# Patient Record
Sex: MALE | Race: WHITE | Employment: FULL TIME | URBAN - METROPOLITAN AREA
[De-identification: names, ages, dates, MRNs, and addresses within clinical notes are randomized per-mention and may not be internally consistent; named-entity substitution may affect disease eponyms.]

---

## 2019-07-28 PROBLEM — S33.5XXA LUMBAR SPRAIN: Status: ACTIVE | Noted: 2018-04-02

## 2019-07-28 RX ORDER — OMEPRAZOLE 20 MG/1
CAPSULE, DELAYED RELEASE ORAL
COMMUNITY
Start: 2018-06-28 | End: 2019-08-28

## 2019-08-17 ENCOUNTER — HOSPITAL ENCOUNTER (EMERGENCY)
Age: 19
Discharge: HOME | End: 2019-08-17
Payer: COMMERCIAL

## 2019-08-17 ENCOUNTER — APPOINTMENT (OUTPATIENT)
Dept: GENERAL RADIOLOGY | Age: 19
End: 2019-08-17
Payer: COMMERCIAL

## 2019-08-17 VITALS
RESPIRATION RATE: 16 BRPM | TEMPERATURE: 98.5 F | SYSTOLIC BLOOD PRESSURE: 129 MMHG | WEIGHT: 159.83 LBS | BODY MASS INDEX: 21.09 KG/M2 | HEART RATE: 80 BPM | DIASTOLIC BLOOD PRESSURE: 77 MMHG | OXYGEN SATURATION: 100 %

## 2019-08-17 DIAGNOSIS — S61.411A LACERATION OF RIGHT HAND WITHOUT FOREIGN BODY, INITIAL ENCOUNTER: Primary | ICD-10-CM

## 2019-08-17 PROCEDURE — 4500000023 HC ED LEVEL 3 PROCEDURE

## 2019-08-17 PROCEDURE — 99283 EMERGENCY DEPT VISIT LOW MDM: CPT

## 2019-08-17 PROCEDURE — 73130 X-RAY EXAM OF HAND: CPT

## 2019-08-17 PROCEDURE — 90715 TDAP VACCINE 7 YRS/> IM: CPT | Performed by: PHYSICIAN ASSISTANT

## 2019-08-17 PROCEDURE — 90471 IMMUNIZATION ADMIN: CPT | Performed by: PHYSICIAN ASSISTANT

## 2019-08-17 PROCEDURE — 6360000002 HC RX W HCPCS: Performed by: PHYSICIAN ASSISTANT

## 2019-08-17 RX ORDER — CEPHALEXIN 500 MG/1
500 CAPSULE ORAL 4 TIMES DAILY
Qty: 40 CAPSULE | Refills: 0 | Status: SHIPPED | OUTPATIENT
Start: 2019-08-17 | End: 2019-08-27

## 2019-08-17 RX ORDER — LIDOCAINE HYDROCHLORIDE 10 MG/ML
5 INJECTION, SOLUTION INFILTRATION; PERINEURAL ONCE
Status: DISCONTINUED | OUTPATIENT
Start: 2019-08-17 | End: 2019-08-17 | Stop reason: HOSPADM

## 2019-08-17 RX ADMIN — TETANUS TOXOID, REDUCED DIPHTHERIA TOXOID AND ACELLULAR PERTUSSIS VACCINE, ADSORBED 0.5 ML: 5; 2.5; 8; 8; 2.5 SUSPENSION INTRAMUSCULAR at 00:49

## 2019-08-17 ASSESSMENT — PAIN - FUNCTIONAL ASSESSMENT: PAIN_FUNCTIONAL_ASSESSMENT: PREVENTS OR INTERFERES SOME ACTIVE ACTIVITIES AND ADLS

## 2019-08-17 ASSESSMENT — PAIN DESCRIPTION - DESCRIPTORS: DESCRIPTORS: DISCOMFORT

## 2019-08-17 ASSESSMENT — PAIN DESCRIPTION - PROGRESSION: CLINICAL_PROGRESSION: NOT CHANGED

## 2019-08-17 ASSESSMENT — PAIN DESCRIPTION - FREQUENCY: FREQUENCY: CONTINUOUS

## 2019-08-17 ASSESSMENT — PAIN DESCRIPTION - ORIENTATION: ORIENTATION: RIGHT

## 2019-08-17 ASSESSMENT — PAIN SCALES - GENERAL: PAINLEVEL_OUTOF10: 4

## 2019-08-17 ASSESSMENT — PAIN DESCRIPTION - PAIN TYPE: TYPE: ACUTE PAIN

## 2019-08-17 ASSESSMENT — PAIN DESCRIPTION - ONSET: ONSET: SUDDEN

## 2019-08-17 ASSESSMENT — PAIN DESCRIPTION - LOCATION: LOCATION: HAND

## 2019-08-17 NOTE — ED PROVIDER NOTES
History of Present Illness     Patient information was obtained from patient. History/Exam limitations: none. Patient presented to the Emergency Department by private vehicle. Chief Complaint   Laceration (right knuckle laceration after punching a tv, patient unsure of last tetanus shot.)      Patient Identification  Tex Mcclure is a 22 y.o. male. Patient sustained a laceration to the dorsum of his right hand. Mechanism of injury: Patient punched a TV because it was not working. Time of injury: Just prior to arrival.  Cut by metal object: Denies  Cut by glass object: Denies  Possibility of a retained foreign body: Denies states that the TV did not shatter  Tetanus status: Not up-to-date  The patient is right hand dominant  Pain is sharp in nature, constant in duration, non-radiating, exacerbated with movement, no remitting factors despite applying direct pressure to successfully stop the bleeding prior to arrival.      Nursing notes reviewed and I agree    Review of Systems  Pertinent items are noted in HPI. Pertinent negatives: no difficulty controlling bleeding, no arterial or pulsatile bleeding,   no difficulty w/ joint movement, no numbness, tingling or difficulty w/ sensation in area. No pre-syncopal symptoms, dizziness, SOB, chest pain, or  N/V/D. Remainder of the ROS reviewed and negative    History reviewed. No pertinent past medical history. History reviewed. No pertinent family history.   Current Facility-Administered Medications   Medication Dose Route Frequency Provider Last Rate Last Dose    lidocaine 1 % injection 5 mL  5 mL Intradermal Once RUBIO Maciel         Current Outpatient Medications   Medication Sig Dispense Refill    cephALEXin (KEFLEX) 500 MG capsule Take 1 capsule by mouth 4 times daily for 10 days 40 capsule 0    omeprazole (PRILOSEC) 20 MG delayed release capsule One to two QHS for stomach acid      ibuprofen (ADVIL;MOTRIN) 800 MG tablet Take 1 tablet by mouth discharge disposition reasonable. Also, there is no evidence or peritonitis, sepsis, or toxicity. The patient and/or family and I have discussed the diagnosis and risks, and we agree with discharging home to follow-up with their primary doctor. We also discussed returning to the Emergency Department immediately if new or worsening symptoms occur. We have discussed the symptoms which are most concerning (e.g., changing or worsening pain, fever, numbness, weakness, cool or painful extremity or digits) that necessitate immediate return. I discussed with [place] and/or family the exam results, diagnosis, care, prognosis, reasons to return and the importance of follow up. Patient and/or family is in full agreement with plan and all questions have been answered. Specific discharge instructions explained, including reasons to return to the emergency department. Mason is well appearing, non-toxic, and afebrile at the time of discharge. Please note that some or all of this chart was generated using [employer] voice recognition software. Although every effort was made to ensure the accuracy of this automated transcription, some errors in transcription may have occurred. IMPRESSION:  1.  Laceration of right hand without foreign body, initial encounter                    Miranda Esquivel, [address]  08/17/19 0355

## 2020-08-31 ENCOUNTER — OFFICE VISIT (OUTPATIENT)
Dept: SLEEP MEDICINE | Age: 20
End: 2020-08-31
Payer: COMMERCIAL

## 2020-08-31 VITALS
SYSTOLIC BLOOD PRESSURE: 122 MMHG | OXYGEN SATURATION: 98 % | TEMPERATURE: 97.9 F | HEART RATE: 85 BPM | BODY MASS INDEX: 22.62 KG/M2 | RESPIRATION RATE: 18 BRPM | DIASTOLIC BLOOD PRESSURE: 78 MMHG | HEIGHT: 72 IN | WEIGHT: 167 LBS

## 2020-08-31 DIAGNOSIS — G25.81 RLS (RESTLESS LEGS SYNDROME): ICD-10-CM

## 2020-08-31 PROBLEM — F51.8 HYPNIC JERKS: Status: ACTIVE | Noted: 2020-08-31

## 2020-08-31 LAB
A/G RATIO: 2.2 (ref 1.1–2.2)
ALBUMIN SERPL-MCNC: 5.2 G/DL (ref 3.4–5)
ALP BLD-CCNC: 44 U/L (ref 40–129)
ALT SERPL-CCNC: 11 U/L (ref 10–40)
ANION GAP SERPL CALCULATED.3IONS-SCNC: 12 MMOL/L (ref 3–16)
AST SERPL-CCNC: 16 U/L (ref 15–37)
BILIRUB SERPL-MCNC: 1.6 MG/DL (ref 0–1)
BUN BLDV-MCNC: 16 MG/DL (ref 7–20)
CALCIUM SERPL-MCNC: 10.1 MG/DL (ref 8.3–10.6)
CHLORIDE BLD-SCNC: 102 MMOL/L (ref 99–110)
CO2: 26 MMOL/L (ref 21–32)
CREAT SERPL-MCNC: 1 MG/DL (ref 0.9–1.3)
FERRITIN: 157 NG/ML (ref 30–400)
GFR AFRICAN AMERICAN: >60
GFR NON-AFRICAN AMERICAN: >60
GLOBULIN: 2.4 G/DL
GLUCOSE BLD-MCNC: 112 MG/DL (ref 70–99)
PHOSPHORUS: 3.3 MG/DL (ref 2.5–4.9)
POTASSIUM SERPL-SCNC: 4.7 MMOL/L (ref 3.5–5.1)
SODIUM BLD-SCNC: 140 MMOL/L (ref 136–145)
TOTAL PROTEIN: 7.6 G/DL (ref 6.4–8.2)

## 2020-08-31 PROCEDURE — 99204 OFFICE O/P NEW MOD 45 MIN: CPT | Performed by: PSYCHIATRY & NEUROLOGY

## 2020-08-31 RX ORDER — PIMOZIDE 1 MG/1
1 TABLET ORAL 2 TIMES DAILY
COMMUNITY
End: 2020-11-10

## 2020-08-31 RX ORDER — ROPINIROLE 0.5 MG/1
TABLET, FILM COATED ORAL
Qty: 60 TABLET | Refills: 3 | Status: SHIPPED | OUTPATIENT
Start: 2020-08-31 | End: 2020-10-28

## 2020-08-31 RX ORDER — CLONAZEPAM 0.25 MG/1
0.25 TABLET, ORALLY DISINTEGRATING ORAL NIGHTLY PRN
Qty: 30 TABLET | Refills: 0 | Status: SHIPPED | OUTPATIENT
Start: 2020-08-31 | End: 2020-09-08

## 2020-08-31 RX ORDER — BUSPIRONE HYDROCHLORIDE 15 MG/1
15 TABLET ORAL 2 TIMES DAILY
COMMUNITY
End: 2020-11-10

## 2020-08-31 ASSESSMENT — SLEEP AND FATIGUE QUESTIONNAIRES
NECK CIRCUMFERENCE (INCHES): 15.5
HOW LIKELY ARE YOU TO NOD OFF OR FALL ASLEEP WHILE WATCHING TV: 0
HOW LIKELY ARE YOU TO NOD OFF OR FALL ASLEEP WHILE SITTING AND READING: 0
ESS TOTAL SCORE: 4
HOW LIKELY ARE YOU TO NOD OFF OR FALL ASLEEP IN A CAR, WHILE STOPPED FOR A FEW MINUTES IN TRAFFIC: 0
HOW LIKELY ARE YOU TO NOD OFF OR FALL ASLEEP WHILE SITTING INACTIVE IN A PUBLIC PLACE: 0
HOW LIKELY ARE YOU TO NOD OFF OR FALL ASLEEP WHILE LYING DOWN TO REST IN THE AFTERNOON WHEN CIRCUMSTANCES PERMIT: 2
HOW LIKELY ARE YOU TO NOD OFF OR FALL ASLEEP WHILE SITTING QUIETLY AFTER LUNCH WITHOUT ALCOHOL: 1
HOW LIKELY ARE YOU TO NOD OFF OR FALL ASLEEP WHEN YOU ARE A PASSENGER IN A CAR FOR AN HOUR WITHOUT A BREAK: 1
HOW LIKELY ARE YOU TO NOD OFF OR FALL ASLEEP WHILE SITTING AND TALKING TO SOMEONE: 0

## 2020-08-31 ASSESSMENT — ENCOUNTER SYMPTOMS
CHOKING: 0
EYES NEGATIVE: 1
NAUSEA: 1
ALLERGIC/IMMUNOLOGIC NEGATIVE: 1
APNEA: 0

## 2020-08-31 NOTE — PROGRESS NOTES
none.    Restless leg syndrome symptoms; patient has overwhelming urge to move the legs, usually associatedwith unpleasant sensations. The urge to move the legs is worse at rest and at night and relieved by movement. Patient has 7 episodes a week, sometime the patient has trouble falling asleep due to this feeling, mostly atthe bed time associated with sleep disturbance and with involuntary, jerking movements of the legs during sleep.    DOT/CDL - N/A  JOSE ANTONIO/'claudia - N/A      Previous Report(s) Reviewed: historical medical records       Social History     Socioeconomic History    Marital status: Single     Spouse name: Not on file    Number of children: Not on file    Years of education: Not on file    Highest education level: Not on file   Occupational History    Not on file   Social Needs    Financial resource strain: Not on file    Food insecurity     Worry: Not on file     Inability: Not on file    Transportation needs     Medical: Not on file     Non-medical: Not on file   Tobacco Use    Smoking status: Never Smoker    Smokeless tobacco: Never Used   Substance and Sexual Activity    Alcohol use: No    Drug use: Not Currently     Types: Marijuana    Sexual activity: Yes     Partners: Female   Lifestyle    Physical activity     Days per week: Not on file     Minutes per session: Not on file    Stress: Not on file   Relationships    Social connections     Talks on phone: Not on file     Gets together: Not on file     Attends Religion service: Not on file     Active member of club or organization: Not on file     Attends meetings of clubs or organizations: Not on file     Relationship status: Not on file    Intimate partner violence     Fear of current or ex partner: Not on file     Emotionally abused: Not on file     Physically abused: Not on file     Forced sexual activity: Not on file   Other Topics Concern    Not on file   Social History Narrative    Not on file       Prior to Admission medications    Medication Sig Start Date End Date Taking? Authorizing Provider   busPIRone (BUSPAR) 15 MG tablet Take 15 mg by mouth 2 times daily   Yes Historical Provider, MD   pimozide (ORAP) 1 MG tablet Take 1 tablet by mouth 2 times daily   Yes Historical Provider, MD   rOPINIRole (REQUIP) 0.5 MG tablet 1-2 tabs 2 hours before the bedtime 8/31/20  Yes Luke Christian MD   clonazePAM (KLONOPIN) 0.25 MG disintegrating tablet Take 1 tablet by mouth nightly as needed (one tab an hour before he bedtime) for up to 30 days. 8/31/20 9/30/20 Yes Luke Christian MD       Allergies as of 08/31/2020    (No Known Allergies)       Patient Active Problem List   Diagnosis    Lumbar sprain    Attention deficit hyperactivity disorder, combined type    Hypnic jerks       History reviewed. No pertinent past medical history. Past Surgical History:   Procedure Laterality Date    NOSE SURGERY         History reviewed. No pertinent family history. Review of Systems   Constitutional: Positive for fatigue. HENT: Positive for congestion. Eyes: Negative. Respiratory: Negative for apnea and choking. Cardiovascular: Negative. Gastrointestinal: Positive for nausea. Endocrine: Negative. Genitourinary: Negative. Musculoskeletal: Negative. Skin: Negative. Allergic/Immunologic: Negative. Neurological: Negative. Negative for headaches. Hematological: Negative. Psychiatric/Behavioral: Negative. Objective:     Vitals:  Weight BMI Neck circumference    Wt Readings from Last 3 Encounters:   08/31/20 167 lb (75.8 kg)   08/28/19 154 lb 15.7 oz (70.3 kg)   08/17/19 159 lb 13.3 oz (72.5 kg)    Body mass index is 22.65 kg/m².  Neck circumference: 15.5     BP HR SaO2   BP Readings from Last 3 Encounters:   08/31/20 122/78   08/28/19 107/65   08/17/19 129/77    Pulse Readings from Last 3 Encounters:   08/31/20 85   08/28/19 81   08/17/19 80    SpO2 Readings from Last 3 Encounters:   08/31/20 98% 08/28/19 98%   08/17/19 100%        The mandibular molar Class :   [x]1 []2 []3      Mallampati I Airway Classification:   []1 []2 [x]3 []4        Physical Exam  Vitals signs and nursing note reviewed. Constitutional:       Appearance: Normal appearance. HENT:      Head: Atraumatic. Nose: Nose normal.      Mouth/Throat:      Comments: Mallampati class 3, no retrognathia or hypognathia , normal airflow in bilateral nostrils, no septum deviation , crowded oropharynx with low soft palate, high arched hard palate,no tonsils enlargement. Eyes:      Extraocular Movements: Extraocular movements intact. Neck:      Musculoskeletal: Normal range of motion and neck supple. Cardiovascular:      Rate and Rhythm: Normal rate and regular rhythm. Heart sounds: Normal heart sounds. Pulmonary:      Effort: Pulmonary effort is normal.      Breath sounds: Normal breath sounds. Musculoskeletal: Normal range of motion. Skin:     General: Skin is warm. Neurological:      General: No focal deficit present. Psychiatric:         Mood and Affect: Mood normal.         Assessment:   Severe RLS and suspected PLMD, plus hypnic jerk which go worse in the last week, unable to sleep much at the present time. Diagnosis Orders   1. RLS (restless legs syndrome)  Ferritin    Comprehensive Metabolic Panel    Renal Function Panel    rOPINIRole (REQUIP) 0.5 MG tablet    clonazePAM (KLONOPIN) 0.25 MG disintegrating tablet   2. Chronic insomnia     3. Hypnic jerks  clonazePAM (KLONOPIN) 0.25 MG disintegrating tablet     Plan: Will try Requip for RLS/PLMD, Will try Klonopin too for Hypnic jerk  No coffee. Sleep compression between 12 and 8 AM.  Will message me in the next 2 days. Orders Placed This Encounter   Procedures    Ferritin    Comprehensive Metabolic Panel    Renal Function Panel       Return in about 4 weeks (around 9/28/2020) for insomnia.     Javier Ortega MD  Medical Director - [facility]

## 2020-08-31 NOTE — PATIENT INSTRUCTIONS
Patient Education        Restless Legs Syndrome: Care Instructions  Your Care Instructions  Restless legs syndrome is a common nervous system problem. People with this syndrome feel a creeping, achy, or unpleasant feeling in the legs and an overpowering urge to move them. It often occurs in the evening and at night and can lead to sleep problems and tiredness. Your doctor may suggest doing a study of your sleep patterns to figure out what is happening when you try to sleep. Many people get relief from symptoms when they get regular exercise, eat well, and avoid caffeine, alcohol, and tobacco.  Follow-up care is a key part of your treatment and safety. Be sure to make and go to all appointments, and call your doctor if you are having problems. It's also a good idea to know your test results and keep a list of the medicines you take. How can you care for yourself at home? · Take your medicines exactly as prescribed. Call your doctor if you think you are having a problem with your medicine. · Try bathing in hot or cold water. Applying a heating pad or ice bag to your legs may also help symptoms. · Stretch and massage your legs before bed or when discomfort begins. · Get some exercise for at least 30 minutes a day on most days of the week. Stop exercising at least 3 hours before bedtime. · Try to plan for situations where you will need to remain seated for long stretches. For example, if you are traveling by car, plan some stops so you can get out and walk around. · Tell your doctor about any medicines you are taking. This includes all over-the-counter, prescription, and herbal medicines. Some medicines, such as antidepressants, antihistamines, and cold and sinus medicines, can make your symptoms worse. · Avoid caffeine products, such as coffee, tea, cola, and chocolate. Caffeine can interrupt your sleep and stimulate you. · Do not smoke. Nicotine can make restless legs worse.  If you need help quitting, talk to your doctor about stop-smoking programs and medicines. These can increase your chances of quitting for good. · Do not drink alcohol late in the evening. Take steps to help you sleep better  · Get plenty of sunlight in the outdoors, particularly later in the afternoon. · Use the evening hours for settling down. Avoid activities that challenge you in the hours before bedtime. · Eat meals at regular times, and do not snack before bedtime. · Keep your bedroom quiet, dark, and cool. Try using a sleep mask and earplugs to help you sleep. · Limit how much you drink at night to reduce your need to get up to urinate. But do not go to bed thirsty. · Run a fan or other steady \"white noise\" during the night if noises wake you up. · [place] the bed for sleeping and sex. Do your reading or TV watching in another room. · Once you are in bed, relax from head to toe, and guide your mind to pleasant thoughts. · Do not stay in bed longer than 8 hours, and try to avoid naps. When should you call for help? Watch closely for changes in your health, and be sure to contact your doctor if:  · You are still not getting enough sleep. · Your symptoms become more severe or happen more often. Where can you learn more? Go to https://[employer]. org and sign in to your [employer] account. Enter G022 in the [facility] box to learn more about \"Restless Legs Syndrome: Care Instructions. \"     If you do not have an account, please click on the \"Sign Up Now\" link. Current as of: November 20, 2019               Content Version: 12.5  © 4662-2851 Healthwise, Incorporated. Care instructions adapted under license by [facility] ([facility]). If you have questions about a medical condition or this instruction, always ask your healthcare professional. [address] any warranty or liability for your use of this information.

## 2020-09-08 RX ORDER — CLONAZEPAM 0.5 MG/1
TABLET ORAL
Qty: 60 TABLET | Refills: 5 | Status: SHIPPED | OUTPATIENT
Start: 2020-09-08 | End: 2020-09-28

## 2020-09-28 ENCOUNTER — OFFICE VISIT (OUTPATIENT)
Dept: ORTHOPEDIC SURGERY | Age: 20
End: 2020-09-28
Payer: COMMERCIAL

## 2020-09-28 ENCOUNTER — OFFICE VISIT (OUTPATIENT)
Dept: SLEEP MEDICINE | Age: 20
End: 2020-09-28
Payer: COMMERCIAL

## 2020-09-28 VITALS
OXYGEN SATURATION: 99 % | RESPIRATION RATE: 14 BRPM | HEART RATE: 86 BPM | BODY MASS INDEX: 23.98 KG/M2 | HEIGHT: 72 IN | DIASTOLIC BLOOD PRESSURE: 70 MMHG | WEIGHT: 177 LBS | TEMPERATURE: 98.6 F | SYSTOLIC BLOOD PRESSURE: 112 MMHG

## 2020-09-28 VITALS — WEIGHT: 177 LBS | HEIGHT: 72 IN | RESPIRATION RATE: 16 BRPM | BODY MASS INDEX: 23.98 KG/M2 | TEMPERATURE: 98.2 F

## 2020-09-28 PROCEDURE — 99213 OFFICE O/P EST LOW 20 MIN: CPT | Performed by: PSYCHIATRY & NEUROLOGY

## 2020-09-28 PROCEDURE — 99203 OFFICE O/P NEW LOW 30 MIN: CPT | Performed by: ORTHOPAEDIC SURGERY

## 2020-09-28 RX ORDER — CLONAZEPAM 1 MG/1
1 TABLET ORAL NIGHTLY PRN
Qty: 30 TABLET | Refills: 5 | Status: SHIPPED | OUTPATIENT
Start: 2020-09-28 | End: 2020-10-29 | Stop reason: SDUPTHER

## 2020-09-28 ASSESSMENT — SLEEP AND FATIGUE QUESTIONNAIRES
HOW LIKELY ARE YOU TO NOD OFF OR FALL ASLEEP IN A CAR, WHILE STOPPED FOR A FEW MINUTES IN TRAFFIC: 0
ESS TOTAL SCORE: 4
HOW LIKELY ARE YOU TO NOD OFF OR FALL ASLEEP WHILE SITTING INACTIVE IN A PUBLIC PLACE: 0
HOW LIKELY ARE YOU TO NOD OFF OR FALL ASLEEP WHILE WATCHING TV: 0
HOW LIKELY ARE YOU TO NOD OFF OR FALL ASLEEP WHILE SITTING AND READING: 0
HOW LIKELY ARE YOU TO NOD OFF OR FALL ASLEEP WHILE SITTING AND TALKING TO SOMEONE: 0
HOW LIKELY ARE YOU TO NOD OFF OR FALL ASLEEP WHILE LYING DOWN TO REST IN THE AFTERNOON WHEN CIRCUMSTANCES PERMIT: 2
HOW LIKELY ARE YOU TO NOD OFF OR FALL ASLEEP WHEN YOU ARE A PASSENGER IN A CAR FOR AN HOUR WITHOUT A BREAK: 1
HOW LIKELY ARE YOU TO NOD OFF OR FALL ASLEEP WHILE SITTING QUIETLY AFTER LUNCH WITHOUT ALCOHOL: 1

## 2020-09-28 ASSESSMENT — ENCOUNTER SYMPTOMS: APNEA: 0

## 2020-09-28 NOTE — PROGRESS NOTES
CHIEF COMPLAINT: Generalized muscle spasm    HISTORY:  Mr. Deann Saucedo is a 23 y.o. male, who presents today for evaluation of a generalized muscles spasms / twitches. He did not have history of injury. He states symptoms have been present for some time. He has twitches or spasms throughout his body (neck, back, arms, thighs). He states he gets multiple twitches an hour. He was seen by neurologist at [facility], but was not very happy with care. He states she referred him to Dr. Uri Santos because he had trouble sleeping. He was diagnosed with restless leg syndrome and hypnic jerks. He was prescribed Klonopin, which did help with his sleep, but not the twitches. He was then referred by the neurologist to orthopaedics. He denies any pain. Nothing aggravates the symptoms. Nothing decreases the twitches. No numbness or tingling sensation. No neck pain. No tremors. He does have chronic history of headaches. History reviewed. No pertinent past medical history. Past Surgical History:   Procedure Laterality Date    NOSE SURGERY         Current Outpatient Medications   Medication Sig Dispense Refill    clonazePAM (KLONOPIN) 1 MG tablet Take 1 tablet by mouth nightly as needed (one). 30 tablet 5    busPIRone (BUSPAR) 15 MG tablet Take 15 mg by mouth 2 times daily      pimozide (ORAP) 1 MG tablet Take 1 tablet by mouth 2 times daily      rOPINIRole (REQUIP) 0.5 MG tablet 1-2 tabs 2 hours before the bedtime 60 tablet 3     No current facility-administered medications for this visit.         No Known Allergies    Social History     Socioeconomic History    Marital status: Single     Spouse name: Not on file    Number of children: Not on file    Years of education: Not on file    Highest education level: Not on file   Occupational History    Not on file   Social Needs    Financial resource strain: Not on file    Food insecurity     Worry: Not on file     Inability: Not on file   Healios K[employer]K needs Medical: Not on file     Non-medical: Not on file   Tobacco Use    Smoking status: Never Smoker    Smokeless tobacco: Never Used   Substance and Sexual Activity    Alcohol use: No    Drug use: Not Currently     Types: Marijuana    Sexual activity: Yes     Partners: Female   Lifestyle    Physical activity     Days per week: Not on file     Minutes per session: Not on file    Stress: Not on file   Relationships    Social connections     Talks on phone: Not on file     Gets together: Not on file     Attends Anglican service: Not on file     Active member of club or organization: Not on file     Attends meetings of clubs or organizations: Not on file     Relationship status: Not on file    Intimate partner violence     Fear of current or ex partner: Not on file     Emotionally abused: Not on file     Physically abused: Not on file     Forced sexual activity: Not on file   Other Topics Concern    Not on file   Social History Narrative    Not on file       History reviewed. No pertinent family history. Review of Systems:   Pertinent items are noted in HPI. 13 point review of systems from Patient History Form dated 9/28/20 and available in the patient's chart under the Media tab. PHYSICAL EXAM:  Mr. Beatriz Hope is a 23 y.o. male who presents today in no acute distress, awake, alert, and oriented. Temp 98.2 °F (36.8 °C)   Resp 16   Ht 6' (1.829 m)   Wt 177 lb (80.3 kg)   BMI 24.01 kg/m²      On evaluation of his bilateral upper and lower extremities, there is no obvious deformity. There is no swelling and is no ecchymosis. He has no tenderness throughout his entire spine or bilateral upper and lower extremities. He has no pain with ROM of his neck. He has full ROM of shoulder, elbows, wrists, hips, knee, ankles. Distal pulses are 2+ and symmetric bilaterally. Sensation is grossly intact to light touch and symmetric bilateral upper and lower extremities.   His skin is intact without evidence of lesion, laceration or abrasion all extremities  No clonus or hyperreflexia. Negative Márquez's test      Ferritin, Renal function panel, CMP 8/31/20 reviewed. IMPRESSION:    Generalized body muscle spasms / twitches      PLAN:  Discussed that I do not see any specific orthopaedic problem for me to treat. His labs were mostly normal without obvious electrolyte abnormality that could explain spasms. I have provided him a referral to Dr. Reji Gavin (neurology) for second opinion.

## 2020-09-28 NOTE — PROGRESS NOTES
MD RYDER Nguyen Board Certified in Sleep Medicine  Certified in [address] [place] [address] Certified in Neurology [address]  [address] [facility]  [phone]   [address] [place] [address], [address] [place] [address] Ne                      [address]  [address] [place] [id number] [phone]    Subjective:     Patient ID: Francina Closs is a 23 y.o. male. Chief Complaint   Patient presents with    Other     RLS f/u       HPI:        Francina Closs is a 23 y.o. male was seen today as a follow for hypnic jerk and RLS    Goes to bed at 11 PM 12 AM and weekend 3-3:30 AM and get weekdays 7:30 AM and weekend 12-1 PM.  The Klonopin 1 mg did help the jerks, the Requip helps his RLS.        Ferritin  157.0  30.0 - 400.0 ng/mL  Final     The patient is still have muscle contractions even during the daytime    DOT/CDL - N/A        Previous Report(s)Reviewed: historical medical records         Social History     Socioeconomic History    Marital status: Single     Spouse name: Not on file    Number of children: Not on file    Years of education: Not on file    Highest education level: Not on file   Occupational History    Not on file   Social Needs    Financial resource strain: Not on file    Food insecurity     Worry: Not on file     Inability: Not on file    Transportation needs     Medical: Not on file     Non-medical: Not on file   Tobacco Use    Smoking status: Never Smoker    Smokeless tobacco: Never Used   Substance and Sexual Activity    Alcohol use: No    Drug use: Not Currently     Types: Marijuana    Sexual activity: Yes     Partners: Female   Lifestyle    Physical activity     Days per week: Not on file     Minutes per session: Not on file    Stress: Not on file   Relationships    Social connections     Talks on phone: Not on file     Gets together: Not on file     Attends Evangelical service: Not on file     Active member of club or organization: Not on file     Attends meetings of clubs or organizations: Not on file     Relationship status: Not on file    Intimate partner violence     Fear of current or ex partner: Not on file     Emotionally abused: Not on file     Physically abused: Not on file     Forced sexual activity: Not on file   Other Topics Concern    Not on file   Social History Narrative    Not on file       Prior to Admission medications    Medication Sig Start Date End Date Taking? Authorizing Provider   clonazePAM (KLONOPIN) 1 MG tablet Take 1 tablet by mouth nightly as needed (one). 9/28/20 9/28/21 Yes Nikki Collazo MD   rOPINIRole (REQUIP) 0.5 MG tablet 1-2 tabs 2 hours before the bedtime 8/31/20  Yes Nikki Collazo MD   busPIRone (BUSPAR) 15 MG tablet Take 15 mg by mouth 2 times daily    Historical Provider, MD   pimozide (ORAP) 1 MG tablet Take 1 tablet by mouth 2 times daily    Historical Provider, MD       Allergies as of 09/28/2020    (No Known Allergies)       Patient Active Problem List   Diagnosis    Lumbar sprain    Attention deficit hyperactivity disorder, combined type    Hypnic jerks       History reviewed. No pertinent past medical history. Past Surgical History:   Procedure Laterality Date    NOSE SURGERY         History reviewed. No pertinent family history. Review of Systems   Constitutional: Negative for fatigue. Respiratory: Negative for apnea. Cardiovascular: Negative for leg swelling. Genitourinary: Negative for frequency. Neurological: Negative for dizziness and weakness. Objective:     Vitals:  Weight BMI Neck circumference    Wt Readings from Last 3 Encounters:   09/28/20 177 lb (80.3 kg)   09/28/20 177 lb (80.3 kg)   08/31/20 167 lb (75.8 kg)    Body mass index is 24.01 kg/m².        BP HR SaO2   BP Readings from Last 3 Encounters:   09/28/20 112/70   08/31/20

## 2020-10-28 RX ORDER — ROPINIROLE 0.5 MG/1
TABLET, FILM COATED ORAL
Qty: 60 TABLET | Refills: 3 | Status: SHIPPED | OUTPATIENT
Start: 2020-10-28 | End: 2021-04-14

## 2020-10-29 RX ORDER — CLONAZEPAM 1 MG/1
1 TABLET ORAL NIGHTLY PRN
Qty: 30 TABLET | Refills: 5 | Status: SHIPPED | OUTPATIENT
Start: 2020-10-29 | End: 2020-12-07 | Stop reason: SDUPTHER

## 2020-11-09 NOTE — PROGRESS NOTES
11/10/2020  Patient Name: Luther Maldonado  : 2000  Medical Record: <[id number]>    MEDICATIONS  Current Outpatient Medications   Medication Sig Dispense Refill    doxycycline hyclate (VIBRAMYCIN) 100 MG capsule Take 100 mg by mouth 2 times daily      clonazePAM (KLONOPIN) 1 MG tablet Take 1 tablet by mouth nightly as needed (one). 30 tablet 5    rOPINIRole (REQUIP) 0.5 MG tablet TAKE 1 TO 2 TABLETS BY MOUTH 2 HOURS BEFORE THE BEDTIME 60 tablet 3     No current facility-administered medications for this visit. ALLERGIES  No Known Allergies      Comments  No specialty comments available. REFERRING PHYSICIAN: Anam Mckeon MD    HISTORY OF PRESENT ILLNESS  Luther Maldonado is a 23 y.o. male with no significant past medical history who is being seen for follow up evaluation of  joint pain and muscle twitches. He had a tick bite 8 months ago in his left lower leg and developed a concentric rash. Since then he is complaining of muscle twitches. He had extensive neurological evaluation that was unremarkable. He was evaluated by neurologist at [facility]. He had EMG as well that came back normal.  He had a test for Lyme disease and was found to have 2+ bands. He was treated with 14-day course of doxycycline. Rash is still present. He is scheduled to see ID. He is also complaining of fatigue, headaches, balance problems. For the past 3 weeks he is complaining of cracking in the joints with joint discomfort lasting for few minutes. All the joints crack and pop. Symptoms are worse in his ankles. He denies any swelling in the joints. He has morning stiffness lasting for 30 minutes. He denies any family history of rheumatoid arthritis. He denies psoriasis, inflammatory bowel disease, inflammatory back pain, dactylitis, enthesitis, tenosynovitis or uveitis.   He denies malar rash, photosensitivity, oral/nasal ulcers, dry eyes/dry mouth, history of kidney disease, blood clots, pleurisy or pericarditis       HPI  Review of Systems    REVIEW OF SYSTEMS:   Constitutional: No unanticipated weight loss or fevers. Integumentary: No photosensitivity, malar rash, livedo reticularis, alopecia and Raynaud's symptoms, sclerodactyly, skin tightening  Eyes: negative for visual disturbance and persistent redness, discharge from eyes   ENT: - No tinnitus, loss of hearing, vertigo, or recurrent ear infections.  - No history of nasal/oral ulcers. - No history of dry eyes/dry mouth  Cardiovascular: No history of pericarditis, chest pain or murmur or palpitations  Respiratory: No shortness of breath, cough or history of interstitial lung disease. No history of pleurisy. No history of tuberculosis or atypical infections. Gastrointestinal: No history of heart burn, dysphagia or esophageal dysmotility. No change in bowel habits or any inflammatory bowel disease. Genitourinary: No history renal disease, miscarriages. Hematologic/Lymphatic: No abnormal bruising or bleeding, blood clots or swollen lymph nodes. Neurological: No history of  seizure or focal weakness. No history of neuropathies, paresthesias or hyperesthesias, facial droop, diplopia  Psychiatric: No history of bipolar disease, anxiety, depression  Endocrine: Denies any polyuria, polydipsia and osteoporosis  Allergic/Immunologic: No nasal congestion or hives. I have reviewed patients Past medical History, Social History and Family History as mentioned in her chart and this remains unchanged fromprevious. History reviewed. No pertinent past medical history.   Past Surgical History:   Procedure Laterality Date    NOSE SURGERY       Social History     Socioeconomic History    Marital status: Single     Spouse name: Not on file    Number of children: Not on file    Years of education: Not on file    Highest education level: Not on file   Occupational History    Not on file   Social Needs    Financial resource strain: Not on file   [address] [place] [address] ROM, no tenderness to palpation        Knee: Full ROM, no erythema/swelling/laxity/crepitus. Patella not ballotable. Ankle: Full ROM, no swelling or erythema        MTPs: No swelling or erythema  Back- no tenderness. Eyes:  PERRL, extra ocular movements intact, conjunctiva normal   HEENT:  Atraumatic, normocephalic, external ears normal, oropharynx moist, no pharyngeal exudates. Respiratory:  No respiratory distress  GI:  Soft, nondistended, normal bowel sounds, nontender, noorganomegaly, no mass, no rebound, no guarding   :  No costovertebral angle tenderness   Integument:  Well hydrated, no telangiectasias. Erythematous oval-shaped rash on the left calf  Lymphatic:  No lymphadenopathy noted   Neurologic:   Alert & oriented x 3, CN 2-12 normal, no focal deficits noted. Sensations Intact. Muscle strength 5/5 proximallyand distally in upper and lower extremities.    Psychiatric:  Speech and behavior appropriate           LABS AND IMAGING  Outside data reviewed and in HPI    No results found for: WBC, RBC, HGB, HCT, PLT, MCV, MCH, MCHC, RDW, NRBC, SEGSPCT, BANDSPCT, BLASTSPCT, METASPCT, LYMPHOPCT, PROMYELOPCT, MONOPCT, MYELOPCT, EOSPCT, BASOPCT, MONOSABS, LYMPHSABS, EOSABS, BASOSABS, DIFFTYPE    Chemistry        Component Value Date/Time     08/31/2020 1509    K 4.7 08/31/2020 1509     08/31/2020 1509    CO2 26 08/31/2020 1509    BUN 16 08/31/2020 1509    CREATININE 1.0 08/31/2020 1509        Component Value Date/Time    CALCIUM 10.1 08/31/2020 1509    ALKPHOS 44 08/31/2020 1509    AST 16 08/31/2020 1509    ALT 11 08/31/2020 1509    BILITOT 1.6 (H) 08/31/2020 1509          No results found for: SEDRATE  No results found for: CRP  No results found for: DARRYL, RENNY, SSA, SSB, C3, C4  No results found for: RF, CCPABIGG  No results found for: DARRYL, ANATITER, ANAINT, PATH  No results found for: DSDNAG, DSDNAIGGIFA  No results found for: SSAROAB, SSALAAB  No results found for: SMAB, RNPAB  No results found for: CENTABIGG  No results found for: C3, C4, ACE  No results found for: Lestine Hilts, CCL32SPMXM  No results found for: Barba Lanius  No results found for: KJDAFYDZ65  No results found for: TSHFT4, TSH  No results found for: VITD25    ASSESSMENT AND PLAN      Assessment/Plan:      ASSESSMENT:    1. Polyarthralgia    2. Muscle twitch        PLAN:     Sky was seen today for establish care. Diagnoses and all orders for this visit:    Sawyer Knight do not appreciate any swelling on the joint exam.  Doubt Lyme arthritis or rheumatoid arthritis or systemic rheumatic disease. Most likely mechanical pain  I will do work-up to completely rule out rheumatoid arthritis or systemic rheumatic disease  Advised to take over-the-counter ibuprofen or Aleve as needed  Scheduled to see ID for possible Lyme disease  Depending on the results I will arrange a follow-up appointment. If work-up is unremarkable I will see him on as-needed basis  -     DARRYL; Future  -     Hepatitis B Core Antibody, IgM; Future  -     Hepatitis B Surface Antigen; Future  -     Hepatitis C Antibody; Future  -     C-Reactive Protein; Future  -     Comprehensive Metabolic Panel; Future  -     CBC Auto Differential; Future  -     Cyclic Citrul Peptide Antibody, IgG; Future  -     Sedimentation Rate; Future  -     Rheumatoid Factor; Future    Muscle twitch-had extensive work-up which was unremarkable. Diagnosed with benign fasciculations. Requesting a second opinion. Will refer to Dr. Jacques Majano MD, Neurology, [facility]         The patient indicates understanding of these issues and agrees with the plan. Return if symptoms worsen or fail to improve. The risks and benefits of my recommendations, as well as other treatment options, benefits and side effects werediscussed with the patient. All questions were answered.     I reviewed patient's history, referral documents and electronic medical records  Copy of consult note is being routedelectronically/faxed to referring physician         ######################################################################    I thank you for giving me theopportunity to participate in [facility] care. If you have any questions or concerns please feel free to contact me. I look forward to following  Sky along with you. Electronically signed by: India Hall MD, 11/10/2020 1:57 PM    Documentation was done using voice recognition dragon software. Every effort was made to ensure accuracy;however, inadvertent unintentional computerized transcription errors may be present.

## 2020-11-10 ENCOUNTER — OFFICE VISIT (OUTPATIENT)
Dept: RHEUMATOLOGY | Age: 20
End: 2020-11-10
Payer: COMMERCIAL

## 2020-11-10 VITALS
TEMPERATURE: 97.8 F | WEIGHT: 177.4 LBS | HEIGHT: 72 IN | HEART RATE: 80 BPM | SYSTOLIC BLOOD PRESSURE: 108 MMHG | DIASTOLIC BLOOD PRESSURE: 65 MMHG | BODY MASS INDEX: 24.03 KG/M2

## 2020-11-10 DIAGNOSIS — M25.50 POLYARTHRALGIA: ICD-10-CM

## 2020-11-10 LAB
BASOPHILS ABSOLUTE: 0 K/UL (ref 0–0.2)
BASOPHILS RELATIVE PERCENT: 0.9 %
EOSINOPHILS ABSOLUTE: 0.1 K/UL (ref 0–0.6)
EOSINOPHILS RELATIVE PERCENT: 1.2 %
HCT VFR BLD CALC: 44.3 % (ref 40.5–52.5)
HEMOGLOBIN: 14.9 G/DL (ref 13.5–17.5)
LYMPHOCYTES ABSOLUTE: 1.5 K/UL (ref 1–5.1)
LYMPHOCYTES RELATIVE PERCENT: 27 %
MCH RBC QN AUTO: 29.9 PG (ref 26–34)
MCHC RBC AUTO-ENTMCNC: 33.6 G/DL (ref 31–36)
MCV RBC AUTO: 89.1 FL (ref 80–100)
MONOCYTES ABSOLUTE: 0.5 K/UL (ref 0–1.3)
MONOCYTES RELATIVE PERCENT: 9.2 %
NEUTROPHILS ABSOLUTE: 3.5 K/UL (ref 1.7–7.7)
NEUTROPHILS RELATIVE PERCENT: 61.7 %
PDW BLD-RTO: 13.2 % (ref 12.4–15.4)
PLATELET # BLD: 197 K/UL (ref 135–450)
PMV BLD AUTO: 10 FL (ref 5–10.5)
RBC # BLD: 4.97 M/UL (ref 4.2–5.9)
SEDIMENTATION RATE, ERYTHROCYTE: 2 MM/HR (ref 0–15)
WBC # BLD: 5.6 K/UL (ref 4–11)

## 2020-11-10 PROCEDURE — 99244 OFF/OP CNSLTJ NEW/EST MOD 40: CPT | Performed by: INTERNAL MEDICINE

## 2020-11-10 RX ORDER — DOXYCYCLINE HYCLATE 100 MG/1
100 CAPSULE ORAL 2 TIMES DAILY
COMMUNITY
End: 2020-12-08 | Stop reason: ALTCHOICE

## 2020-11-10 NOTE — LETTER
[facility]  [address] [address]  Phone: [phone]  Fax: [phone]    Yusuf Eldridge MD        November 10, 2020     Vita Blandon MD  [address] [place] [address]. [address] [place] [address],[address], [place] [address]    Patient: Racheal Bundy  MR Number: <[id number]>  YOB: 2000  Date of Visit: 11/10/2020    Dear Dr. Kojo Shaw: Thank you for your referral. Progress note attached in visit summary. If you have questions, please do not hesitate to call me. I look forward to following Joselo Santos along with you.     Sincerely,        Yusuf Eldridge MD

## 2020-11-11 LAB
A/G RATIO: 1.8 (ref 1.1–2.2)
ALBUMIN SERPL-MCNC: 4.8 G/DL (ref 3.4–5)
ALP BLD-CCNC: 48 U/L (ref 40–129)
ALT SERPL-CCNC: 16 U/L (ref 10–40)
ANION GAP SERPL CALCULATED.3IONS-SCNC: 10 MMOL/L (ref 3–16)
ANTI-NUCLEAR ANTIBODY (ANA): NEGATIVE
AST SERPL-CCNC: 18 U/L (ref 15–37)
BILIRUB SERPL-MCNC: 0.7 MG/DL (ref 0–1)
BUN BLDV-MCNC: 17 MG/DL (ref 7–20)
C-REACTIVE PROTEIN: 0.6 MG/L (ref 0–5.1)
CALCIUM SERPL-MCNC: 9.6 MG/DL (ref 8.3–10.6)
CHLORIDE BLD-SCNC: 101 MMOL/L (ref 99–110)
CO2: 26 MMOL/L (ref 21–32)
CREAT SERPL-MCNC: 1 MG/DL (ref 0.9–1.3)
CYCLIC CITRULLINATED PEPTIDE ANTIBODY IGG: <0.5 U/ML (ref 0–2.9)
GFR AFRICAN AMERICAN: >60
GFR NON-AFRICAN AMERICAN: >60
GLOBULIN: 2.6 G/DL
GLUCOSE BLD-MCNC: 97 MG/DL (ref 70–99)
HEPATITIS B CORE IGM ANTIBODY: NORMAL
HEPATITIS B SURFACE ANTIGEN INTERPRETATION: NORMAL
HEPATITIS C ANTIBODY INTERPRETATION: NORMAL
POTASSIUM SERPL-SCNC: 4.4 MMOL/L (ref 3.5–5.1)
RHEUMATOID FACTOR: <10 IU/ML
SODIUM BLD-SCNC: 137 MMOL/L (ref 136–145)
TOTAL PROTEIN: 7.4 G/DL (ref 6.4–8.2)

## 2020-12-07 ENCOUNTER — OFFICE VISIT (OUTPATIENT)
Dept: SLEEP MEDICINE | Age: 20
End: 2020-12-07
Payer: COMMERCIAL

## 2020-12-07 VITALS
WEIGHT: 179 LBS | SYSTOLIC BLOOD PRESSURE: 121 MMHG | TEMPERATURE: 97.8 F | HEART RATE: 73 BPM | OXYGEN SATURATION: 98 % | DIASTOLIC BLOOD PRESSURE: 77 MMHG | BODY MASS INDEX: 24.24 KG/M2 | HEIGHT: 72 IN | RESPIRATION RATE: 16 BRPM

## 2020-12-07 PROCEDURE — 99213 OFFICE O/P EST LOW 20 MIN: CPT | Performed by: PSYCHIATRY & NEUROLOGY

## 2020-12-07 RX ORDER — CLONAZEPAM 1 MG/1
1 TABLET ORAL NIGHTLY PRN
Qty: 60 TABLET | Refills: 5 | Status: SHIPPED | OUTPATIENT
Start: 2020-12-07 | End: 2021-06-14

## 2020-12-07 ASSESSMENT — ENCOUNTER SYMPTOMS: APNEA: 0

## 2020-12-08 ENCOUNTER — HOSPITAL ENCOUNTER (OUTPATIENT)
Age: 20
Discharge: HOME | End: 2020-12-08
Payer: COMMERCIAL

## 2020-12-08 ENCOUNTER — OFFICE VISIT (OUTPATIENT)
Dept: INFECTIOUS DISEASES | Age: 20
End: 2020-12-08
Payer: COMMERCIAL

## 2020-12-08 VITALS
DIASTOLIC BLOOD PRESSURE: 72 MMHG | HEIGHT: 72 IN | BODY MASS INDEX: 24.28 KG/M2 | TEMPERATURE: 98.3 F | SYSTOLIC BLOOD PRESSURE: 118 MMHG

## 2020-12-08 PROCEDURE — 87390 HIV-1 AG IA: CPT

## 2020-12-08 PROCEDURE — 86702 HIV-2 ANTIBODY: CPT

## 2020-12-08 PROCEDURE — 86780 TREPONEMA PALLIDUM: CPT

## 2020-12-08 PROCEDURE — 36415 COLL VENOUS BLD VENIPUNCTURE: CPT

## 2020-12-08 PROCEDURE — 99243 OFF/OP CNSLTJ NEW/EST LOW 30: CPT | Performed by: INTERNAL MEDICINE

## 2020-12-08 PROCEDURE — 86701 HIV-1ANTIBODY: CPT

## 2020-12-08 NOTE — PROGRESS NOTES
Infectious Diseases   Consult Note      Reason for Consult:  Lyme disease   Requesting Physician:    Alger Dakins, MD     Subjective:   CHIEF COMPLAINT:    HPI:    Cedric Webb is a 20yoM with limited medical history     8 month history of \"jerk out of sleep\" and muscle twitching,occurred right as he was falling asleep  Saw sleep specialist.  [employer] helped with sleep  Sometime thereafter, began to note small transient muscle twitching of muscles throughout the body. \"Joints started locking up\" ankles, hips, shoulders felt stiff and would pop and crack. Night time clonic jerking got worse hence increased dose klonopin and melatonin    Referred to Neurology at [facility] 10/2020  EMG completed  L calf rash noted, present ~1 year per patient. Pruritic. Dry skin. Had WB testing for lyme that was negative (had 2 bands present) though he was told it was positive and was given 2 week course of doxy. No impact noted except he noted fading of the rash. Has also been evaluated by Ortho, Rheumatology   He remains concerned that he has MS , ALS or another progressive degenerative disease   Has another Neuro appt upcoming        Current abx:  none       Past Surgical History:   No past medical history on file. Procedure Laterality Date    NOSE SURGERY         Social History:    TOBACCO:   reports that he has never smoked. He has never used smokeless tobacco.  ETOH:   reports no history of alcohol use. There is no history of illicit drug use or other significant epidemiologic exposures. Lives in [address] [place] [address], [facility] and also works full time [employer]. Originally from the area. Lives in a house w roommate. 2 dogs in the home. Sexually active, women, 4 partners in the last 6 months  No history of STI   No tobacco, rarely cannibis, social alcohol   Travel in the last year limited to [facility]       Family History:   No family history on file.   There is no family history of autoimmune diseases or significant infectious diseases. No Known Allergies       REVIEW OF SYSTEMS:    CONSTITUTIONAL:  negative for fevers, chills, diaphoresis, activity change, appetite change, fatigue, night sweats. +Weight change, intentional.  Works out regularly   EYES:   L eye blurred vision intermittently   Sometimes in the last month has noted difficulty word finding and slurred speech transiently. infrequent  HEENT:  negative for hearing loss, tinnitus, ear drainage, sinus pressure, nasal congestion, epistaxis and snoring  Throat always sore  \"Lots of mucous\" at night when trying to sleep   RESPIRATORY:  No cough, +shortness of breath, feels like he has forgotten to breath, \"my brain forgot to breath\" several times a day; no hemoptysis  CARDIOVASCULAR:  negative for chest pain, palpitations, exertional chest pressure/discomfort, edema, syncope  GASTROINTESTINAL:  negative for nausea, vomiting, diarrhea, constipation, blood in stool and abdominal pain  GENITOURINARY:  negative for frequency, dysuria, urinary incontinence, decreased urine volume, and hematuria  HEMATOLOGIC/LYMPHATIC:  negative for easy bruising, bleeding and lymphadenopathy  ALLERGIC/IMMUNOLOGIC:  negative for recurrent infections, angioedema, anaphylaxis and drug reactions  ENDOCRINE:  negative for weight changes and diabetic symptoms including polyuria, polydipsia and polyphagia  Hair thinning x several months   MUSCULOSKELETAL:  Per HPI   NEUROLOGICAL:  Long history of intermittent headaches, no slurred speech or unilateral weakness  PSYCHIATRIC/BEHAVIORAL: \"I have bad anxiety\"  Saw a counselor ~3 months ago, depressed, going through a breakup, Rx SSRIs but thought that the sleep jerking and muscle twitching got worse and he stopped them       Objective:   PHYSICAL EXAM:      VITALS:  There were no vitals taken for this visit.      24HR INTAKE/OUTPUT:  No intake or output data in the 24 hours ending 12/08/20 0815  CONSTITUTIONAL:  Awake, alert, cooperative, no apparent distress, and appears stated age  Pressured speech   HEENT: NCAT, PERRL, EOMI. Sclera white, conjunctiva full. OP with moist mucosal membranes, no thrush, tongue protrudes midline  NECK:  Supple, symmetrical, trachea midline, no adenopathy  LUNGS:  no increased work of breathing, CTA ta without W/R/R  CARDIOVASCULAR:  RRR without murmur  ABDOMEN:  normal bowel sounds, soft, flat, NT   PSYCHIATRIC: Oriented to person place and time. No obvious depression or anxiety. MUSCULOSKELETAL: No obvious misalignment or effusion of the joints. No clubbing, cyanosis of the digits. SKIN:  normal skin color, texture, turgor and no redness, warmth, or swelling. No palpable nodules or stigmata of embolic phenomenon  NEUROLOGIC: nonfocal exam        DATA:    Old records have been reviewed    Lyme WB 10/2020 was negative       Radiology Review:  All pertinent images / reports were reviewed as a part of this visit. Assessment:     Patient Active Problem List   Diagnosis    Lumbar sprain    Attention deficit hyperactivity disorder, combined type    Hypnic jerks       Movement disorder, NOS     Anxiety    Discussion:  Outside records reviewed in detail and results discussed with patient   Lyme testing 10/2020 was negative. I do not have lingering suspicion of borellia infection as culprit for presenting issues. No further testing for Lyme is recommended at this time. Agree with plan for Neurologic evaluation. For completeness will check HIV and syphilis screens     Discussed with patient in detail. RTC PRN assuming labs are negative        La [place] M.D. Thank you for the opportunity to participate in the care of your patient.     Please do not hesitate to contact me:   [phone] office  [phone] mobile

## 2020-12-09 ENCOUNTER — TELEPHONE (OUTPATIENT)
Dept: INFECTIOUS DISEASES | Age: 20
End: 2020-12-09

## 2020-12-09 LAB
HIV AG/AB: NORMAL
HIV ANTIGEN: NORMAL
HIV-1 ANTIBODY: NORMAL
HIV-2 AB: NORMAL
TOTAL SYPHILLIS IGG/IGM: NORMAL

## 2020-12-10 ENCOUNTER — OFFICE VISIT (OUTPATIENT)
Dept: PRIMARY CARE CLINIC | Age: 20
End: 2020-12-10
Payer: COMMERCIAL

## 2020-12-10 PROCEDURE — 99211 OFF/OP EST MAY X REQ PHY/QHP: CPT | Performed by: NURSE PRACTITIONER

## 2020-12-11 LAB — SARS-COV-2, NAA: NOT DETECTED

## 2020-12-16 ENCOUNTER — HOSPITAL ENCOUNTER (OUTPATIENT)
Dept: SLEEP CENTER | Age: 20
Discharge: HOME | End: 2020-12-16
Payer: COMMERCIAL

## 2020-12-16 PROCEDURE — 95810 POLYSOM 6/> YRS 4/> PARAM: CPT

## 2020-12-16 PROCEDURE — 95810 POLYSOM 6/> YRS 4/> PARAM: CPT | Performed by: PSYCHIATRY & NEUROLOGY

## 2020-12-21 ENCOUNTER — TELEPHONE (OUTPATIENT)
Dept: FAMILY MEDICINE CLINIC | Age: 20
End: 2020-12-21

## 2020-12-21 ENCOUNTER — TELEPHONE (OUTPATIENT)
Dept: PULMONOLOGY | Age: 20
End: 2020-12-21

## 2020-12-21 NOTE — TELEPHONE ENCOUNTER
Called seb with PSG results  PSG was negative for sleep apnea.   He has a f/u with Dr Willard Franco regarding RLS  Will discuss further at this appt

## 2020-12-21 NOTE — TELEPHONE ENCOUNTER
Called pt to reschedule NP appt. States he has been waiting for appt for approximately 6 months and does not want to wait until May for next available appt   Can we get pt in sooner for NP appt?    Please advise

## 2021-02-01 ENCOUNTER — OFFICE VISIT (OUTPATIENT)
Dept: SLEEP MEDICINE | Age: 21
End: 2021-02-01
Payer: COMMERCIAL

## 2021-02-01 VITALS
HEART RATE: 75 BPM | RESPIRATION RATE: 12 BRPM | HEIGHT: 72 IN | OXYGEN SATURATION: 98 % | DIASTOLIC BLOOD PRESSURE: 70 MMHG | SYSTOLIC BLOOD PRESSURE: 118 MMHG | BODY MASS INDEX: 24.49 KG/M2 | WEIGHT: 180.8 LBS | TEMPERATURE: 97.8 F

## 2021-02-01 DIAGNOSIS — F51.8 HYPNIC JERKS: ICD-10-CM

## 2021-02-01 DIAGNOSIS — G47.52 REM SLEEP BEHAVIOR DISORDER: ICD-10-CM

## 2021-02-01 DIAGNOSIS — G25.81 RLS (RESTLESS LEGS SYNDROME): Primary | ICD-10-CM

## 2021-02-01 PROCEDURE — 99213 OFFICE O/P EST LOW 20 MIN: CPT | Performed by: PSYCHIATRY & NEUROLOGY

## 2021-02-01 ASSESSMENT — ENCOUNTER SYMPTOMS: APNEA: 0

## 2021-02-01 NOTE — PROGRESS NOTES
Marijuana    Sexual activity: Yes     Partners: Female   Lifestyle    Physical activity     Days per week: Not on file     Minutes per session: Not on file    Stress: Not on file   Relationships    Social connections     Talks on phone: Not on file     Gets together: Not on file     Attends Church service: Not on file     Active member of club or organization: Not on file     Attends meetings of clubs or organizations: Not on file     Relationship status: Not on file    Intimate partner violence     Fear of current or ex partner: Not on file     Emotionally abused: Not on file     Physically abused: Not on file     Forced sexual activity: Not on file   Other Topics Concern    Not on file   Social History Narrative    Not on file       Prior to Admission medications    Medication Sig Start Date End Date Taking? Authorizing Provider   clonazePAM (KLONOPIN) 1 MG tablet Take 1 tablet by mouth nightly as needed (1-2 tabs QHS). 12/7/20 12/7/21 Yes Les Castillo MD   rOPINIRole (REQUIP) 0.5 MG tablet TAKE 1 TO 2 TABLETS BY MOUTH 2 HOURS BEFORE THE BEDTIME 10/28/20  Yes Les Castillo MD       Allergies as of 02/01/2021    (No Known Allergies)       Patient Active Problem List   Diagnosis    Lumbar sprain    Attention deficit hyperactivity disorder, combined type    Hypnic jerks    REM sleep behavior disorder       History reviewed. No pertinent past medical history. Past Surgical History:   Procedure Laterality Date    NOSE SURGERY         History reviewed. No pertinent family history. Review of Systems   Constitutional: Negative for fatigue. Respiratory: Negative for apnea. Genitourinary: Negative for frequency. Neurological: Negative for headaches. Objective:     Vitals:  Weight BMI Neck circumference    Wt Readings from Last 3 Encounters:   02/01/21 180 lb 12.8 oz (82 kg)   12/07/20 179 lb (81.2 kg)   11/10/20 177 lb 6.4 oz (80.5 kg)    Body mass index is 24.52 kg/m².        BP HR

## 2021-02-11 ENCOUNTER — OFFICE VISIT (OUTPATIENT)
Dept: FAMILY MEDICINE CLINIC | Age: 21
End: 2021-02-11
Payer: COMMERCIAL

## 2021-02-11 VITALS
HEART RATE: 83 BPM | BODY MASS INDEX: 24.65 KG/M2 | OXYGEN SATURATION: 99 % | SYSTOLIC BLOOD PRESSURE: 128 MMHG | DIASTOLIC BLOOD PRESSURE: 80 MMHG | WEIGHT: 182 LBS | HEIGHT: 72 IN | TEMPERATURE: 98.5 F

## 2021-02-11 DIAGNOSIS — F51.8 HYPNIC JERKS: ICD-10-CM

## 2021-02-11 DIAGNOSIS — F41.9 ANXIETY: ICD-10-CM

## 2021-02-11 DIAGNOSIS — L65.9 HAIR LOSS: ICD-10-CM

## 2021-02-11 DIAGNOSIS — R25.3 FASCICULATIONS: ICD-10-CM

## 2021-02-11 DIAGNOSIS — G47.52 REM SLEEP BEHAVIOR DISORDER: ICD-10-CM

## 2021-02-11 DIAGNOSIS — Z00.00 WELL ADULT HEALTH CHECK: Primary | ICD-10-CM

## 2021-02-11 PROBLEM — S33.5XXA LUMBAR SPRAIN: Status: RESOLVED | Noted: 2018-04-02 | Resolved: 2021-02-11

## 2021-02-11 PROCEDURE — 99385 PREV VISIT NEW AGE 18-39: CPT | Performed by: FAMILY MEDICINE

## 2021-02-11 RX ORDER — PHENOL 1.4 %
AEROSOL, SPRAY (ML) MUCOUS MEMBRANE
Qty: 60 TABLET | Refills: 11
Start: 2021-02-11 | End: 2022-01-10

## 2021-02-11 ASSESSMENT — PATIENT HEALTH QUESTIONNAIRE - PHQ9
2. FEELING DOWN, DEPRESSED OR HOPELESS: 0
SUM OF ALL RESPONSES TO PHQ9 QUESTIONS 1 & 2: 0
SUM OF ALL RESPONSES TO PHQ QUESTIONS 1-9: 0
1. LITTLE INTEREST OR PLEASURE IN DOING THINGS: 0

## 2021-02-11 ASSESSMENT — ENCOUNTER SYMPTOMS
DIARRHEA: 0
SINUS PRESSURE: 0
COLOR CHANGE: 0
NAUSEA: 0
COUGH: 0
SORE THROAT: 0
EYE REDNESS: 0
VOMITING: 0
SHORTNESS OF BREATH: 0

## 2021-02-11 NOTE — PATIENT INSTRUCTIONS
Anxiety resources  https://[employer]. "[employer]/resources      Also has a podcast called the [employer] by Dr. Luis Angel Moseley for anxiety  The Mindfulness and Acceptance Workbook for Anxiety: A guide to Breaking Free from Anxiety, Phobias, and Worry Using Acceptance and Commitment Therapy

## 2021-02-11 NOTE — PROGRESS NOTES
Attention deficit hyperactivity disorder, combined type    Hypnic jerks    REM sleep behavior disorder    Fasciculations - Neuro Dr. Mynor Duarte Anxiety      Review of Systems:   Review of Systems   Constitutional: Negative for chills and fever. HENT: Negative for congestion, sinus pressure and sore throat. Eyes: Negative for redness and visual disturbance. Respiratory: Negative for cough and shortness of breath. Cardiovascular: Negative for chest pain and leg swelling. Gastrointestinal: Negative for diarrhea, nausea and vomiting. Genitourinary: Negative for difficulty urinating. Skin: Negative for color change and rash. Neurological: Negative for dizziness and headaches. Muscle twitching   Psychiatric/Behavioral: Negative for confusion. The patient is nervous/anxious. Body mass index is 24.86 kg/m². Wt Readings from Last 3 Encounters:   02/11/21 182 lb (82.6 kg)   02/01/21 180 lb 12.8 oz (82 kg)   12/07/20 179 lb (81.2 kg)       BP Readings from Last 3 Encounters:   02/11/21 128/80   02/01/21 118/70   12/08/20 118/72     Current Outpatient Medications   Medication Sig Dispense Refill    Melatonin 10 MG TABS 20mg nightly for sleep 60 tablet 11    clonazePAM (KLONOPIN) 1 MG tablet Take 1 tablet by mouth nightly as needed (1-2 tabs QHS). 60 tablet 5    rOPINIRole (REQUIP) 0.5 MG tablet TAKE 1 TO 2 TABLETS BY MOUTH 2 HOURS BEFORE THE BEDTIME 60 tablet 3     No current facility-administered medications for this visit. No Known Allergies    No past medical history on file.     Past Surgical History:   Procedure Laterality Date    NOSE SURGERY         Social History     Tobacco Use    Smoking status: Never Smoker    Smokeless tobacco: Never Used   Substance Use Topics    Alcohol use: Yes     Comment: Socially    Drug use: Not Currently     Types: Marijuana       Family History   Problem Relation Age of Onset    Diabetes Father     High Blood Pressure Father     Alcohol Abuse Paternal Grandmother        Physical Exam   Physical Exam  Constitutional:       Appearance: He is well-developed. HENT:      Head: Normocephalic and atraumatic. Eyes:      Conjunctiva/sclera: Conjunctivae normal.   Neck:      Musculoskeletal: Normal range of motion and neck supple. Thyroid: No thyromegaly. Cardiovascular:      Rate and Rhythm: Normal rate and regular rhythm. Heart sounds: Normal heart sounds. No murmur. Pulmonary:      Effort: Pulmonary effort is normal.      Breath sounds: Normal breath sounds. No wheezing. Abdominal:      General: Bowel sounds are normal.      Palpations: Abdomen is soft. There is no mass. Tenderness: There is no abdominal tenderness. Musculoskeletal: Normal range of motion. Lymphadenopathy:      Cervical: No cervical adenopathy. Skin:     General: Skin is warm and dry. Findings: No rash. Comments: Normal turgor   Neurological:      Mental Status: He is alert and oriented to person, place, and time. Deep Tendon Reflexes: Reflexes normal.      Comments: Symmetrical reflexes  Symmetrical strength   Psychiatric:         Thought Content: Thought content normal.       Vitals:    02/11/21 1059   BP: 128/80   Pulse: 83   Temp: 98.5 °F (36.9 °C)   SpO2: 99%   Weight: 182 lb (82.6 kg)   Height: 5' 11.75\" (1.822 m)       Estimated body mass index is 24.86 kg/m² as calculated from the following:    Height as of this encounter: 5' 11.75\" (1.822 m). Weight as of this encounter: 182 lb (82.6 kg).     Health Maintenance   Topic Date Due    HPV vaccine (2 - Male 3-dose series) 03/24/2016    DTaP/Tdap/Td vaccine (8 - Td) 08/17/2029    Hepatitis B vaccine  Completed    Hib vaccine  Completed    Varicella vaccine  Completed    Meningococcal (ACWY) vaccine  Completed    Flu vaccine  Completed    Hepatitis C screen  Completed    HIV screen  Completed    Hepatitis A vaccine  Aged Out    Pneumococcal 0-64 years Vaccine  Aged Out Assessment/Plan  1. Well adult health check    2. REM sleep behavior disorder    3. Hypnic jerks    4. Fasciculations    5. Anxiety    6. Hair loss  - TSH WITH REFLEX TO FT4; Future    Reviewed at the lifestyle  He has a REM sleep behavior disorder treated by her sleep specialist and reports significant improvement. However he does have fasciculations and reports other abnormal motor issues. He is scheduled to see a movement disorder specialist with neurology soon. He has had an extensive prior work-up as noted above. We did discuss anxiety treatment but he reports no improvement on the 5 medications listed above. We will do GeneSight testing and consider adding a different medication at that time.       Follow-up in 3 months for anxiety

## 2021-03-16 PROBLEM — F44.4 FUNCTIONAL NEUROLOGICAL SYMPTOM DISORDER WITH ABNORMAL MOVEMENT: Status: ACTIVE | Noted: 2021-03-16

## 2021-03-26 ENCOUNTER — OFFICE VISIT (OUTPATIENT)
Dept: FAMILY MEDICINE CLINIC | Age: 21
End: 2021-03-26
Payer: COMMERCIAL

## 2021-03-26 VITALS
RESPIRATION RATE: 14 BRPM | SYSTOLIC BLOOD PRESSURE: 130 MMHG | HEART RATE: 86 BPM | OXYGEN SATURATION: 98 % | WEIGHT: 190.8 LBS | TEMPERATURE: 97.9 F | HEIGHT: 72 IN | DIASTOLIC BLOOD PRESSURE: 78 MMHG | BODY MASS INDEX: 25.84 KG/M2

## 2021-03-26 DIAGNOSIS — F41.9 ANXIETY: ICD-10-CM

## 2021-03-26 DIAGNOSIS — F44.4 FUNCTIONAL NEUROLOGICAL SYMPTOM DISORDER WITH ABNORMAL MOVEMENT: Primary | ICD-10-CM

## 2021-03-26 DIAGNOSIS — J01.90 ACUTE SINUSITIS, RECURRENCE NOT SPECIFIED, UNSPECIFIED LOCATION: ICD-10-CM

## 2021-03-26 PROCEDURE — 99214 OFFICE O/P EST MOD 30 MIN: CPT | Performed by: FAMILY MEDICINE

## 2021-03-26 RX ORDER — AMOXICILLIN AND CLAVULANATE POTASSIUM 875; 125 MG/1; MG/1
1 TABLET, FILM COATED ORAL 2 TIMES DAILY
Qty: 14 TABLET | Refills: 0 | Status: SHIPPED | OUTPATIENT
Start: 2021-03-26 | End: 2021-04-02

## 2021-03-26 NOTE — Clinical Note
Can we see if Brunilda our new counselor can see pt? If not please let him know and he can pursue other options I gave him.  Thanks

## 2021-03-26 NOTE — PATIENT INSTRUCTIONS
[facility]   Licensed Clinical Psychologist   Alejandrina, [address] [facility]   [email or web address]     [address] [facility] [address], [address] [place] [address]   [email or web address]    Isentio, [address] [place] [address]    [address], [address] [facility]   [email or web address]    Marla Ramsay  [facility]   Therapist   Alejandrina [address] [place] [address]   [email or web address]

## 2021-03-26 NOTE — PROGRESS NOTES
3/26/2021    This is a 23 y.o. male   Chief Complaint   Patient presents with    Follow-up     neuro fu. HPI   - Full time senior at [employer], graduates Summer 2021  - Full time dispatcher at [employer]    Here for follow up  - Sky was evaluated by movement disorder specialist Dr. Deanna Cantu at [facility]. Dx with functional neurological disorder  - treatment plan is involving increased physical activity, physical therapy, and a counselor/therapist    Anxiety  - he is interested in seeing a counselor as noted above. He reports utilizing marijuana to help with his anxiety. He has previously tried SSRIs as well as Adderall for ADHD and had adverse effects. Concerned for 6 weeks of sore throat, congestion, cough. No fever or SOB. Cough is non-productive. He's had prior surgery for a deviated septum so reports chronic sinus symptoms as baseline  - taking seasonal allergy medication    Review of Systems     No past medical history on file. Past Surgical History:   Procedure Laterality Date    NOSE SURGERY         Family History   Problem Relation Age of Onset    Diabetes Father     High Blood Pressure Father     Alcohol Abuse Paternal Grandmother        Current Outpatient Medications   Medication Sig Dispense Refill    amoxicillin-clavulanate (AUGMENTIN) 875-125 MG per tablet Take 1 tablet by mouth 2 times daily for 7 days 14 tablet 0    Melatonin 10 MG TABS 20mg nightly for sleep 60 tablet 11    clonazePAM (KLONOPIN) 1 MG tablet Take 1 tablet by mouth nightly as needed (1-2 tabs QHS). 60 tablet 5    rOPINIRole (REQUIP) 0.5 MG tablet TAKE 1 TO 2 TABLETS BY MOUTH 2 HOURS BEFORE THE BEDTIME 60 tablet 3     No current facility-administered medications for this visit.         /78   Pulse 86   Temp 97.9 °F (36.6 °C)   Resp 14   Ht 6' (1.829 m)   Wt 190 lb 12.8 oz (86.5 kg)   SpO2 98%   BMI 25.88 kg/m²     Physical Exam  Constitutional: He is interested in seeing a therapist. Information was given for this      Return in about 6 months (around 9/26/2021) for anxiety f/u.

## 2021-04-06 NOTE — PROGRESS NOTES
Brunilda is not yet seeing pts  He will need to pursue other options at this time.   lvm with this info

## 2021-04-12 DIAGNOSIS — G25.81 RLS (RESTLESS LEGS SYNDROME): ICD-10-CM

## 2021-04-14 RX ORDER — ROPINIROLE 0.5 MG/1
TABLET, FILM COATED ORAL
Qty: 60 TABLET | Refills: 3 | Status: SHIPPED | OUTPATIENT
Start: 2021-04-14 | End: 2021-07-07

## 2021-04-19 ENCOUNTER — HOSPITAL ENCOUNTER (EMERGENCY)
Age: 21
Discharge: HOME | End: 2021-04-19
Payer: COMMERCIAL

## 2021-04-19 ENCOUNTER — APPOINTMENT (OUTPATIENT)
Dept: GENERAL RADIOLOGY | Age: 21
End: 2021-04-19
Payer: COMMERCIAL

## 2021-04-19 VITALS
TEMPERATURE: 98 F | WEIGHT: 194 LBS | BODY MASS INDEX: 26.31 KG/M2 | DIASTOLIC BLOOD PRESSURE: 68 MMHG | SYSTOLIC BLOOD PRESSURE: 125 MMHG | OXYGEN SATURATION: 95 % | HEART RATE: 72 BPM | RESPIRATION RATE: 16 BRPM

## 2021-04-19 DIAGNOSIS — M89.8X6 BILATERAL TIBIAL PAIN: Primary | ICD-10-CM

## 2021-04-19 PROCEDURE — 73590 X-RAY EXAM OF LOWER LEG: CPT

## 2021-04-19 PROCEDURE — 99283 EMERGENCY DEPT VISIT LOW MDM: CPT

## 2021-04-19 RX ORDER — NAPROXEN 500 MG/1
500 TABLET ORAL 2 TIMES DAILY WITH MEALS
Qty: 20 TABLET | Refills: 0 | Status: SHIPPED | OUTPATIENT
Start: 2021-04-19 | End: 2021-07-07

## 2021-04-19 ASSESSMENT — PAIN DESCRIPTION - LOCATION: LOCATION: ANKLE

## 2021-04-19 ASSESSMENT — ENCOUNTER SYMPTOMS
BACK PAIN: 0
ABDOMINAL PAIN: 0
EYE DISCHARGE: 0
VOMITING: 0
SHORTNESS OF BREATH: 0
APNEA: 0
FACIAL SWELLING: 0
NAUSEA: 0
CHOKING: 0
EYE REDNESS: 0

## 2021-04-19 ASSESSMENT — PAIN DESCRIPTION - ORIENTATION: ORIENTATION: RIGHT;LEFT

## 2021-04-19 ASSESSMENT — PAIN - FUNCTIONAL ASSESSMENT
PAIN_FUNCTIONAL_ASSESSMENT: 0-10
PAIN_FUNCTIONAL_ASSESSMENT: ACTIVITIES ARE NOT PREVENTED

## 2021-04-19 ASSESSMENT — PAIN DESCRIPTION - ONSET: ONSET: ON-GOING

## 2021-04-19 ASSESSMENT — PAIN SCALES - GENERAL
PAINLEVEL_OUTOF10: 5
PAINLEVEL_OUTOF10: 5

## 2021-04-19 ASSESSMENT — PAIN DESCRIPTION - DESCRIPTORS: DESCRIPTORS: ACHING

## 2021-04-19 ASSESSMENT — PAIN DESCRIPTION - PAIN TYPE: TYPE: ACUTE PAIN

## 2021-04-19 NOTE — ED NOTES
D/C: Order noted for d/c. Pt confirmed d/c paperwork and one prescription has correct name. Discharge and education instructions reviewed with patient. Teach-back successful. Pt verbalized understanding and signed d/c papers. Pt denied questions at this time. No acute distress noted. Patient instructed to follow-up as noted - return to emergency department if symptoms worsen. Patient verbalized understanding. Discharged per EDMD with discharge instructions. Pt discharged to private vehicle. Patient stable upon departure. Thanked patient for choosing [facility] for care. Provider aware of patient pain at time of discharge.        Kendra Grimaldo RN  04/19/21 1952

## 2021-04-19 NOTE — ED PROVIDER NOTES
**ADVANCED PRACTICE PROVIDER, I HAVE EVALUATED THIS PATIENT**        [facility] ENCOUNTER      Pt Name: Nancy Goddard  PDL:[id number]  [place] 2000  Date of evaluation: 4/19/2021  Provider: Stone Whyte PA-C      Chief Complaint:    Chief Complaint   Patient presents with    Ankle Pain     bilateral ankle pain x months was at gym today and pain got worse NKI       Nursing Notes, Past Medical Hx, Past Surgical Hx, Social Hx, Allergies, and Family Hx were all reviewed and agreed with or any disagreements were addressed in the HPI.    HPI:  (Location, Duration, Timing, Severity, Quality, Assoc Sx, Context, Modifying factors)  This is a  23 y.o. male complain of bilateral shin pain. He is not actually having ankle pain. More the lower shins. Said every time he runs go up and down steps and even walking distance he will get pain. Jo Clemens he was seen couple years ago for the same and a diagnosed with shin splints. Said he rested it and taking anti-inflammatories been icing every time he stopped exercising it will not be as bad but then once he starts walking and running again and exercising it flares up again. Denies injury. Denies knee pain, no hip pain. Does have some low back pain which she is seeing a chiropractor for the past.  Denies numbness or tingling in his feet or fingers. No upper back or neck pain. No chest pain, no abdominal pain, no weaknesses. No other complaints. PastMedical/Surgical History:  No past medical history on file. Procedure Laterality Date    NOSE SURGERY         Medications:  Previous Medications    CLONAZEPAM (KLONOPIN) 1 MG TABLET    Take 1 tablet by mouth nightly as needed (1-2 tabs QHS).     MELATONIN 10 MG TABS    20mg nightly for sleep    ROPINIROLE (REQUIP) 0.5 MG TABLET    TAKE 1 TO 2 TABLETS BY MOUTH 2 HOURS BEFORE THE BEDTIME         Review of Systems:  Review of Systems   Constitutional: swelling. Pedal pulse good at 2+. Normal temperature. Sensation is intact to the bilateral lower legs. X-ray of the bilateral lower tibia shows no acute osseous abnormality of either the left or right lower tibia and fibula. Discussed at this time patient x-ray results with him discussed discharge plan I will put him on naproxen and he is to ice and heat area and follow-up orthopedics. Patient is okay with this plan. He will be discharged stable condition. Decreased exercise until he follow-up. The patient tolerated their visit well. I evaluated the patient. The physician was available for consultation as needed. The patient and / or the family were informed of the results of any tests, a time was given to answer questions, a plan was proposed and they agreed with plan. CLINICAL IMPRESSION:  1.  Bilateral tibial pain        DISPOSITION  DISPOSITION Decision To Discharge 04/19/2021 07:19:21 PM          PAT[facility]:  [facility] [address]  [phone]    Call   As needed    [address], [address]  [phone]    Call in 1 day        DISCHARGE MEDICATIONS:  New Prescriptions    NAPROXEN (NAPROSYN) 500 MG TABLET    Take 1 tablet by mouth 2 times daily (with meals)       DISCONTINUED MEDICATIONS:  Discontinued Medications    No medications on file              (Please note the MDM and HPI sections of this note were completed with a voice recognition program.  Efforts were made to edit the dictations but occasionally words are mis-transcribed.)    Electronically signed, Crystal Branham PA-C,          Crystal Branham PA-C  04/19/21 1922

## 2021-04-19 NOTE — ED NOTES
Pt to Xray via stretcher at this time.      Spike SCANLON [facility]  04/19/21 1005

## 2021-04-21 ENCOUNTER — OFFICE VISIT (OUTPATIENT)
Dept: ORTHOPEDIC SURGERY | Age: 21
End: 2021-04-21
Payer: COMMERCIAL

## 2021-04-21 VITALS — BODY MASS INDEX: 26.28 KG/M2 | RESPIRATION RATE: 14 BRPM | HEIGHT: 72 IN | WEIGHT: 194 LBS

## 2021-04-21 DIAGNOSIS — S86.892A SHIN SPLINT, LEFT, INITIAL ENCOUNTER: ICD-10-CM

## 2021-04-21 DIAGNOSIS — S86.891A SHIN SPLINT, RIGHT, INITIAL ENCOUNTER: Primary | ICD-10-CM

## 2021-04-21 PROCEDURE — 99214 OFFICE O/P EST MOD 30 MIN: CPT | Performed by: ORTHOPAEDIC SURGERY

## 2021-04-21 NOTE — PROGRESS NOTES
CHIEF COMPLAINT: Bilateral anterior leg/ ankle pain/ shin splint. HISTORY:  Mr. Celena Davila 23 y.o. male presents today for evaluation of bilateral anterior leg and ankle pain which started to worsen 4 years ago and has been waxing and waning since then, better and worse. He initially presented to [facility] ER on 4/19/2021 where he was x-rayed and instructed to follow-up with orthopedics.  He is complaining of sharp achy pain. Rates his pain a 6/10 VAS. Pain is increase with standing and walking and ankle plantar flexion. Pain is dull achy pain by the end of the day. He states he has been unable to work out in [employer] as he can walk from machine to machine. No radiation and no numbness and tingling sensation. No other complaint. There is no history of ankle injury. He reports he has seen another orthopedic surgeons has tried chiropractic, physical therapy, stretching, and NSAIDs with no improvement. History reviewed. No pertinent past medical history.     Past Surgical History:   Procedure Laterality Date    NOSE SURGERY         Social History     Socioeconomic History    Marital status: Single     Spouse name: Not on file    Number of children: Not on file    Years of education: Not on file    Highest education level: Not on file   Occupational History    Not on file   Social Needs    Financial resource strain: Not on file    Food insecurity     Worry: Not on file     Inability: Not on file    Transportation needs     Medical: Not on file     Non-medical: Not on file   Tobacco Use    Smoking status: Never Smoker    Smokeless tobacco: Never Used   Substance and Sexual Activity    Alcohol use: Yes     Comment: Socially    Drug use: Not Currently     Types: Marijuana    Sexual activity: Yes     Partners: Female   Lifestyle    Physical activity     Days per week: Not on file     Minutes per session: Not on file    Stress: Not on file   Relationships    Social connections     Talks on phone: Not on file     Gets together: Not on file     Attends Cheondoism service: Not on file     Active member of club or organization: Not on file     Attends meetings of clubs or organizations: Not on file     Relationship status: Not on file    Intimate partner violence     Fear of current or ex partner: Not on file     Emotionally abused: Not on file     Physically abused: Not on file     Forced sexual activity: Not on file   Other Topics Concern    Not on file   Social History Narrative    Not on file       Family History   Problem Relation Age of Onset    Diabetes Father     High Blood Pressure Father     Alcohol Abuse Paternal Grandmother        Current Outpatient Medications on File Prior to Visit   Medication Sig Dispense Refill    naproxen (NAPROSYN) 500 MG tablet Take 1 tablet by mouth 2 times daily (with meals) 20 tablet 0    rOPINIRole (REQUIP) 0.5 MG tablet TAKE 1 TO 2 TABLETS BY MOUTH 2 HOURS BEFORE THE BEDTIME 60 tablet 3    Melatonin 10 MG TABS 20mg nightly for sleep 60 tablet 11    clonazePAM (KLONOPIN) 1 MG tablet Take 1 tablet by mouth nightly as needed (1-2 tabs QHS). 60 tablet 5     No current facility-administered medications on file prior to visit. Pertinent items are noted in HPI  Review of systems reviewed from Patient History Form dated on 9/28/2020 and available in the patient's chart under the Media tab. No change noted. PHYSICAL EXAMINATION:  Mr. Masha Evans is a very pleasant 23 y.o.  male who presents today in no acute distress, awake, alert, and oriented. He is well dressed, nourished and  groomed. Patient with normal affect. Height is  6' (1.829 m), weight is 194 lb (88 kg), Body mass index is 26.31 kg/m². Resting respiratory rate is 16.      Examination of the gait, showed that the patient walks heel-toe with minimal limp.  Examination of bilateral ankle showing decrease ROM of ankle joint(0 degree DF) with anterior ankle pain with plantar flexion

## 2021-04-22 ENCOUNTER — TELEPHONE (OUTPATIENT)
Dept: ORTHOPEDIC SURGERY | Age: 21
End: 2021-04-22

## 2021-04-22 NOTE — TELEPHONE ENCOUNTER
MRI TIBIA FIBULA Right no precert required - MARIYA    Called and relayed message to patient. Gave him [address] number to call and schedule and faxed all of his information over to them.  He is aware to f/u in the office for results

## 2021-04-24 PROBLEM — S86.891A SHIN SPLINT, RIGHT, INITIAL ENCOUNTER: Status: ACTIVE | Noted: 2021-04-24

## 2021-04-24 PROBLEM — S86.892A SHIN SPLINT, LEFT, INITIAL ENCOUNTER: Status: ACTIVE | Noted: 2021-04-24

## 2021-06-10 ENCOUNTER — OFFICE VISIT (OUTPATIENT)
Dept: FAMILY MEDICINE CLINIC | Age: 21
End: 2021-06-10
Payer: COMMERCIAL

## 2021-06-10 VITALS
BODY MASS INDEX: 25.19 KG/M2 | DIASTOLIC BLOOD PRESSURE: 68 MMHG | WEIGHT: 186 LBS | OXYGEN SATURATION: 98 % | RESPIRATION RATE: 14 BRPM | HEART RATE: 74 BPM | HEIGHT: 72 IN | SYSTOLIC BLOOD PRESSURE: 104 MMHG

## 2021-06-10 DIAGNOSIS — R23.8 SKIN IRRITATION: Primary | ICD-10-CM

## 2021-06-10 DIAGNOSIS — G25.81 RLS (RESTLESS LEGS SYNDROME): ICD-10-CM

## 2021-06-10 DIAGNOSIS — F51.8 HYPNIC JERKS: ICD-10-CM

## 2021-06-10 PROCEDURE — 99213 OFFICE O/P EST LOW 20 MIN: CPT | Performed by: FAMILY MEDICINE

## 2021-06-10 RX ORDER — TRIAMCINOLONE ACETONIDE 5 MG/G
CREAM TOPICAL
Qty: 30 G | Refills: 2 | Status: SHIPPED | OUTPATIENT
Start: 2021-06-10 | End: 2021-06-17

## 2021-06-10 NOTE — PROGRESS NOTES
6/10/2021    This is a 23 y.o. male   Chief Complaint   Patient presents with    Other     Patient wanting to discuss preventative treatment because he always gets a rash on abdomen whenever he goes swimming or on vacation      HPI    He notes a rash that typically occurs when he goes on vacation. It typically happens when he is in water. He will get redness around his umbilicus that often spreads and gets irritated. It itches badly and is sometimes painful. Review of Systems     Current Outpatient Medications   Medication Sig Dispense Refill    triamcinolone (ARISTOCORT) 0.5 % cream Apply topically 3 times daily as needed for no more than 15 consecutive days 30 g 2    Melatonin 10 MG TABS 20mg nightly for sleep 60 tablet 11    clonazePAM (KLONOPIN) 1 MG tablet Take 1 tablet by mouth nightly as needed (1-2 tabs QHS). 60 tablet 5    naproxen (NAPROSYN) 500 MG tablet Take 1 tablet by mouth 2 times daily (with meals) (Patient not taking: Reported on 6/10/2021) 20 tablet 0    rOPINIRole (REQUIP) 0.5 MG tablet TAKE 1 TO 2 TABLETS BY MOUTH 2 HOURS BEFORE THE BEDTIME (Patient not taking: Reported on 6/10/2021) 60 tablet 3     No current facility-administered medications for this visit. /68 (Site: Right Upper Arm, Position: Sitting)   Pulse 74   Resp 14   Ht 6' (1.829 m)   Wt 186 lb (84.4 kg)   SpO2 98%   BMI 25.23 kg/m²     Physical Exam    Wt Readings from Last 3 Encounters:   06/10/21 186 lb (84.4 kg)   04/21/21 194 lb (88 kg)   04/19/21 194 lb 0.1 oz (88 kg)     BP Readings from Last 3 Encounters:   06/10/21 104/68   04/19/21 125/68   03/26/21 130/78       Assessment/Plan:  1. Skin irritation  - triamcinolone (ARISTOCORT) 0.5 % cream; Apply topically 3 times daily as needed for no more than 15 consecutive days  Dispense: 30 g; Refill: 2    Sounds like an eczema variant that occurs when in water and gets irritated. Sending in triamcinolone to use while on vacation.  If it doesn't improve he can message us for an anti-fungal if needed

## 2021-06-11 ENCOUNTER — TELEPHONE (OUTPATIENT)
Dept: PULMONOLOGY | Age: 21
End: 2021-06-11

## 2021-06-11 NOTE — TELEPHONE ENCOUNTER
Need Klonopin filled , pt is completely out and can not sleep without it ,  please send to [facility] on [address]

## 2021-06-14 RX ORDER — CLONAZEPAM 1 MG/1
TABLET ORAL
Qty: 60 TABLET | Refills: 5 | Status: SHIPPED | OUTPATIENT
Start: 2021-06-14 | End: 2021-12-13 | Stop reason: SDUPTHER

## 2021-07-07 ENCOUNTER — HOSPITAL ENCOUNTER (EMERGENCY)
Age: 21
Discharge: HOME | End: 2021-07-07
Payer: COMMERCIAL

## 2021-07-07 VITALS
BODY MASS INDEX: 24.37 KG/M2 | RESPIRATION RATE: 14 BRPM | TEMPERATURE: 97.7 F | SYSTOLIC BLOOD PRESSURE: 111 MMHG | DIASTOLIC BLOOD PRESSURE: 64 MMHG | OXYGEN SATURATION: 99 % | HEART RATE: 80 BPM | WEIGHT: 179.9 LBS | HEIGHT: 72 IN

## 2021-07-07 DIAGNOSIS — H00.021 HORDEOLUM INTERNUM OF RIGHT UPPER EYELID: Primary | ICD-10-CM

## 2021-07-07 PROCEDURE — 99284 EMERGENCY DEPT VISIT MOD MDM: CPT

## 2021-07-07 RX ORDER — SULFAMETHOXAZOLE AND TRIMETHOPRIM 800; 160 MG/1; MG/1
1 TABLET ORAL 2 TIMES DAILY
Qty: 14 TABLET | Refills: 0 | Status: SHIPPED | OUTPATIENT
Start: 2021-07-07 | End: 2021-07-14

## 2021-07-07 ASSESSMENT — ENCOUNTER SYMPTOMS
ROS SKIN COMMENTS: SEE HPI
EYE PAIN: 0
NAUSEA: 0
RESPIRATORY NEGATIVE: 1
EYE REDNESS: 0
VOMITING: 0
EYE DISCHARGE: 1

## 2021-07-07 ASSESSMENT — PAIN DESCRIPTION - ORIENTATION: ORIENTATION: RIGHT

## 2021-07-07 ASSESSMENT — PAIN DESCRIPTION - PAIN TYPE: TYPE: ACUTE PAIN

## 2021-07-07 ASSESSMENT — PAIN DESCRIPTION - LOCATION: LOCATION: EYE

## 2021-07-07 ASSESSMENT — PAIN DESCRIPTION - DESCRIPTORS: DESCRIPTORS: BURNING;SHARP

## 2021-07-07 ASSESSMENT — VISUAL ACUITY
OD: 20/15
OS: 20/15

## 2021-07-07 ASSESSMENT — PAIN SCALES - GENERAL: PAINLEVEL_OUTOF10: 6

## 2021-07-07 NOTE — ED PROVIDER NOTES
[address]  [address] [address]  Dept: [phone]  Loc: [phone]  eMERGENCYdEPARTMENT eNCOUnter      Pt Name: Dru Wells  MRN: [id number]  [place] 2000  Date of evaluation: 7/7/2021  Provider:Sinai Olmstead PA-C    CHIEF COMPLAINT       Chief Complaint   Patient presents with    Eye Pain     swelling noted above right eye, painful x 2 days       CRITICAL CARE TIME   Total Critical Care time was 0 minutes, excluding separately reportable procedures. There was a high probability of clinically significant/life threatening deterioration in the patient's condition which required my urgentintervention. HISTORY OF PRESENT ILLNESS  (Location/Symptom, Timing/Onset, Context/Setting, Quality, Duration,Modifying Factors, Severity.)   Dru Wells is a 19 y.o. male who presents to the emergency department by private vehicle complaining of painful bump to right eyelid. Patient states he noticed a tender bump to his right about 2 days ago. He has had progressive increasing swelling, pain and redness to affected region. He has tearing throughout the day and mucoid discharge in eye that is worse in the morning. Denies any trauma or injury to eye. No visual changes. Feels well otherwise. Nursing Notes were reviewedand agreed with or any disagreements were addressed in the HPI. REVIEW OF SYSTEMS    (2-9 systems for level 4, 10 or more for level 5)     Review of Systems   Constitutional: Negative for chills and fever. HENT: Negative. Eyes: Positive for discharge. Negative for pain, redness and visual disturbance. See HPI   Respiratory: Negative. Gastrointestinal: Negative for nausea and vomiting. Musculoskeletal: Negative for arthralgias and joint swelling. Skin:        See HPI   Neurological: Negative. Psychiatric/Behavioral: Negative for behavioral problems and confusion. Except as noted above the remainder of the review of systems was reviewed and negative. PAST MEDICAL HISTORY   History reviewed. No pertinent past medical history. SURGICAL HISTORY           Procedure Laterality Date    NOSE SURGERY         CURRENT MEDICATIONS     [unfilled]    ALLERGIES     Patient has no known allergies. FAMILY HISTORY           Problem Relation Age of Onset    Diabetes Father     High Blood Pressure Father     Alcohol Abuse Paternal Grandmother      Family Status   Relation Name Status    Mother  Alive    Father  Alive    [facility]  (Not Specified)        SOCIAL HISTORY      reports that he has never smoked. He has never used smokeless tobacco. He reports current alcohol use. He reports previous drug use. Drug: Marijuana. PHYSICAL EXAM    (up to 7 for level 4, 8 or more for level 5)     ED Triage Vitals [07/07/21 1813]   Enc Vitals Group      /68      Pulse 86      Resp 16      Temp 97.7 °F (36.5 °C)      Temp Source Oral      SpO2 99 %      Weight 179 lb 14.3 oz (81.6 kg)      Height 6' (1.829 m)      Head Circumference       Peak Flow       Pain Score       Pain Loc       Pain Edu? Excl. in [address]? Physical Exam  Constitutional:       Appearance: Normal appearance. HENT:      Head: Normocephalic and atraumatic. Eyes:     Musculoskeletal:         General: Normal range of motion. Cervical back: Normal range of motion and neck supple. Skin:     General: Skin is warm. Neurological:      General: No focal deficit present. Mental Status: He is alert and oriented to person, place, and time.    Psychiatric:         Mood and Affect: Mood normal.         Behavior: Behavior normal.           DIAGNOSTIC RESULTS     EKG: All EKG's are interpreted by the Emergency Department Physician who either signs or Co-signs this chart in the absence of a cardiologist.    RADIOLOGY:   Non-plain film images such as CT, Ultrasound and MRI are read by the radiologist. Zak Smoker radiographic images are visualized and preliminarilyinterpreted by the emergency physician with the below findings:    Interpretation per the Radiologist below,if available at the time of this note:    No orders to display         LABS:  Labs Reviewed - No data to display    All other labs were within normal range or not returned as of this dictation. EMERGENCY DEPARTMENT COURSE and DIFFERENTIAL DIAGNOSIS/MDM:   Vitals:    Vitals:    07/07/21 1813 07/07/21 1930   BP: 116/68 111/64   Pulse: 86 80   Resp: 16 14   Temp: 97.7 °F (36.5 °C)    TempSrc: Oral    SpO2: 99%    Weight: 179 lb 14.3 oz (81.6 kg)    Height: 6' (1.829 m)        MDM     Patient presents ED with HPI noted above. Vital signs reviewed and within normal limits. Exam as above. Exam consistent with hordeolum and mild preseptal cellulitis. Patient has interviews next couple days, given this and mild preseptal sinus to place on oral antibiotics. There is no significant periorbital erythema/edema/tenderness. No further emergent treatment, work-up evaluation indicated. Patient educated on concerning symptoms such prompt evaluation. He was educated on supportive treatment measures as well. He was discharged home in stable condition. The patient tolerated their visit well. I saw the patient independently with physician available for consultation as needed. I have discussed the findings of today's workup with the patient and addressed the patient's questions and concerns. Important warning signs as well as new or worsening symptoms which would necessitate immediate return to the ED were discussed. The plan is to discharge from the ED at this time, and the patient is in stable condition. The patient acknowledged understanding is agreeable with this plan. CONSULTS:  None    PROCEDURES:  Procedures    FINAL IMPRESSION      1.  Hordeolum internum of right upper eyelid          DISPOSITION/PLAN   @[facility]@    PATIENT REFERRED TO:  [facility] Emergency Department  [address] [facility] [address] [id number]  [phone]  Go to   If symptoms worsen    MD Joe De [facility] [address] [place] [address]  [phone]    Call in 1 week  For follow up and reevaluation.       DISCHARGE MEDICATIONS:  Discharge Medication List as of 7/7/2021  8:43 PM      START taking these medications    Details   sulfamethoxazole-trimethoprim (BACTRIM DS) 800-160 MG per tablet Take 1 tablet by mouth 2 times daily for 7 days, Disp-14 tablet, R-0Print             (Please note that portions of this note were completed with a voice recognition program.  Efforts were made to edit the dictations but occasionally words are mis-transcribed.)    [facility] [facility]          [address] [place] [address]  07/07/21 4536

## 2021-07-08 NOTE — ED NOTES
Patient presents to the ED via walk in for right eye pain underneath eyelid/inner corner of eye. Patient denies specific injury, states this has been ongoing for 3 days. Patient alert and oriented, respirations even and easy.        Elena Dial RN  07/07/21 7445

## 2021-07-09 ENCOUNTER — TELEPHONE (OUTPATIENT)
Dept: FAMILY MEDICINE CLINIC | Age: 21
End: 2021-07-09

## 2021-07-09 NOTE — TELEPHONE ENCOUNTER
----- Message from [place] sent at 7/9/2021 10:27 AM EDT -----  Subject: Appointment Request    Reason for Call: Urgent (Patient Request) ED Follow Up Visit    QUESTIONS  Type of Appointment? Established Patient  Reason for appointment request? No appointments available during search  Additional Information for Provider? Patient was seen in the ED on   07/07/21 was seen for eye issue, gave meds and it is not any better will   see anyone or do VV.   ---------------------------------------------------------------------------  --------------  CALL BACK INFO  What is the best way for the office to contact you? OK to leave message on   voicemail  Preferred Call Back Phone Number? [phone]  ---------------------------------------------------------------------------  --------------  SCRIPT ANSWERS  Relationship to Patient? Self  Appointment reason? Well Care/Follow Ups  Select a Well Care/Follow Ups appointment reason? Adult ED Follow Up   [Emergency Room, Emergency Department]  (Patient requests to see provider urgently. )? Yes  Do you have any questions for your primary care provider that need to be   answered prior to your appointment? No  Have you been diagnosed with, awaiting test results for, or told that you   are suspected of having COVID-19 (Coronavirus)? (If patient has tested   negative or was tested as a requirement for work, school, or travel and   not based on symptoms, answer no)? No  Do you currently have flu-like symptoms including fever or chills, cough,   shortness of breath, difficulty breathing, or new loss of taste or smell? No  Have you had close contact with someone with COVID-19 in the last 14 days? No  (Service Expert  click yes below to proceed with [employer] As Usual   Scheduling)?  Yes

## 2021-07-09 NOTE — TELEPHONE ENCOUNTER
Pt sent my chart message to go ahead and call back.   Called pt back and could not leave a message mailbox is full

## 2021-08-27 ENCOUNTER — OFFICE VISIT (OUTPATIENT)
Dept: FAMILY MEDICINE CLINIC | Age: 21
End: 2021-08-27
Payer: COMMERCIAL

## 2021-08-27 VITALS
HEIGHT: 72 IN | SYSTOLIC BLOOD PRESSURE: 114 MMHG | OXYGEN SATURATION: 99 % | HEART RATE: 80 BPM | WEIGHT: 182 LBS | DIASTOLIC BLOOD PRESSURE: 68 MMHG | BODY MASS INDEX: 24.65 KG/M2

## 2021-08-27 DIAGNOSIS — J02.9 SORE THROAT: Primary | ICD-10-CM

## 2021-08-27 PROCEDURE — 99213 OFFICE O/P EST LOW 20 MIN: CPT | Performed by: FAMILY MEDICINE

## 2021-08-27 RX ORDER — PREDNISONE 10 MG/1
TABLET ORAL
Qty: 12 TABLET | Refills: 0 | Status: SHIPPED | OUTPATIENT
Start: 2021-08-27 | End: 2022-01-10 | Stop reason: ALTCHOICE

## 2021-08-27 NOTE — PROGRESS NOTES
8/27/2021    This is a 19 y.o. male   Chief Complaint   Patient presents with    Other     Weakness in arms and hands/fingers and lump on lt side of neck and painful     HPI    Here for concerns for throat discomfort  - going on for the past few weeks. Had a negative COVID test a couple of weeks ago. Pain is on the L side of his throat. Feels swollen on the inside and hurts to press on the L side and hurts on that side when swallowing.  - he does not some post-nasal drip present during this time. Has allergy issues chronically  - food is not getting stuck, but has pain on left when swallowing food. No fevers or chills    Review of Systems     Current Outpatient Medications   Medication Sig Dispense Refill    predniSONE (DELTASONE) 10 MG tablet Take 3 tabs PO for 2 days, 2 tabs for 2 days, 1 tab for 2 days 12 tablet 0    clonazePAM (KLONOPIN) 1 MG tablet TAKE 1-2 TABLETS BY MOUTH EVERY NIGHT AT BEDTIME AS NEEDED 60 tablet 5    Melatonin 10 MG TABS 20mg nightly for sleep 60 tablet 11     No current facility-administered medications for this visit. /68 (Site: Right Upper Arm, Position: Sitting, Cuff Size: Medium Adult)   Pulse 80   Ht 6' (1.829 m)   Wt 182 lb (82.6 kg)   SpO2 99%   BMI 24.68 kg/m²     Physical Exam  Constitutional:       Appearance: He is well-developed. HENT:      Head: Normocephalic and atraumatic. Right Ear: Tympanic membrane normal.      Left Ear: Tympanic membrane normal.      Nose: No congestion. Mouth/Throat:      Mouth: Mucous membranes are moist.      Comments: Mild pharyngeal erythema with postnasal drip  Cardiovascular:      Rate and Rhythm: Normal rate and regular rhythm. Heart sounds: Normal heart sounds. Pulmonary:      Effort: Pulmonary effort is normal.      Breath sounds: Normal breath sounds. Musculoskeletal:         General: No tenderness. Normal range of motion. Cervical back: Normal range of motion.    Lymphadenopathy: Cervical: No cervical adenopathy. Skin:     General: Skin is warm and dry. Findings: No rash. Neurological:      Mental Status: He is alert and oriented to person, place, and time. Deep Tendon Reflexes: Reflexes are normal and symmetric. Wt Readings from Last 3 Encounters:   08/27/21 182 lb (82.6 kg)   07/07/21 179 lb 14.3 oz (81.6 kg)   06/10/21 186 lb (84.4 kg)       BP Readings from Last 3 Encounters:   08/27/21 114/68   07/07/21 111/64   06/10/21 104/68     Assessment/Plan:  1. Sore throat  - predniSONE (DELTASONE) 10 MG tablet; Take 3 tabs PO for 2 days, 2 tabs for 2 days, 1 tab for 2 days  Dispense: 12 tablet; Refill: 0    This sounds like a viral sore throat. He had negative Covid testing. He has no other significant systemic symptoms. No lymphadenopathy present on exam.  Based on the duration of symptoms and pain with swallowing I'm sending in prednisone. We did review potential side effects this medication.   If symptoms were to worsen or fail to improve over the next few weeks he will let us know

## 2021-12-02 ENCOUNTER — APPOINTMENT (OUTPATIENT)
Dept: GENERAL RADIOLOGY | Age: 21
End: 2021-12-02
Payer: COMMERCIAL

## 2021-12-02 ENCOUNTER — HOSPITAL ENCOUNTER (EMERGENCY)
Age: 21
Discharge: HOME | End: 2021-12-02
Payer: COMMERCIAL

## 2021-12-02 VITALS
OXYGEN SATURATION: 97 % | SYSTOLIC BLOOD PRESSURE: 129 MMHG | DIASTOLIC BLOOD PRESSURE: 74 MMHG | RESPIRATION RATE: 19 BRPM | TEMPERATURE: 99.2 F | HEART RATE: 93 BPM

## 2021-12-02 DIAGNOSIS — U07.1 COVID: ICD-10-CM

## 2021-12-02 DIAGNOSIS — R05.9 COUGH: Primary | ICD-10-CM

## 2021-12-02 LAB
A/G RATIO: 1.6 (ref 1.1–2.2)
ALBUMIN SERPL-MCNC: 4.6 G/DL (ref 3.4–5)
ALP BLD-CCNC: 51 U/L (ref 40–129)
ALT SERPL-CCNC: 26 U/L (ref 10–40)
ANION GAP SERPL CALCULATED.3IONS-SCNC: 11 MMOL/L (ref 3–16)
AST SERPL-CCNC: 31 U/L (ref 15–37)
BASOPHILS ABSOLUTE: 0 K/UL (ref 0–0.2)
BASOPHILS RELATIVE PERCENT: 1 %
BILIRUB SERPL-MCNC: 0.3 MG/DL (ref 0–1)
BUN BLDV-MCNC: 16 MG/DL (ref 7–20)
CALCIUM SERPL-MCNC: 8.8 MG/DL (ref 8.3–10.6)
CHLORIDE BLD-SCNC: 101 MMOL/L (ref 99–110)
CO2: 27 MMOL/L (ref 21–32)
CREAT SERPL-MCNC: 1 MG/DL (ref 0.9–1.3)
EOSINOPHILS ABSOLUTE: 0 K/UL (ref 0–0.6)
EOSINOPHILS RELATIVE PERCENT: 0.1 %
GFR AFRICAN AMERICAN: >60
GFR NON-AFRICAN AMERICAN: >60
GLUCOSE BLD-MCNC: 102 MG/DL (ref 70–99)
HCT VFR BLD CALC: 45.7 % (ref 40.5–52.5)
HEMOGLOBIN: 15.1 G/DL (ref 13.5–17.5)
LACTIC ACID: 0.7 MMOL/L (ref 0.4–2)
LYMPHOCYTES ABSOLUTE: 0.7 K/UL (ref 1–5.1)
LYMPHOCYTES RELATIVE PERCENT: 22 %
MCH RBC QN AUTO: 29 PG (ref 26–34)
MCHC RBC AUTO-ENTMCNC: 33 G/DL (ref 31–36)
MCV RBC AUTO: 87.9 FL (ref 80–100)
MONOCYTES ABSOLUTE: 0.5 K/UL (ref 0–1.3)
MONOCYTES RELATIVE PERCENT: 14.6 %
NEUTROPHILS ABSOLUTE: 2.1 K/UL (ref 1.7–7.7)
NEUTROPHILS RELATIVE PERCENT: 62.3 %
PDW BLD-RTO: 13.4 % (ref 12.4–15.4)
PLATELET # BLD: 138 K/UL (ref 135–450)
PMV BLD AUTO: 9.7 FL (ref 5–10.5)
POTASSIUM REFLEX MAGNESIUM: 4 MMOL/L (ref 3.5–5.1)
RBC # BLD: 5.2 M/UL (ref 4.2–5.9)
SODIUM BLD-SCNC: 139 MMOL/L (ref 136–145)
TOTAL PROTEIN: 7.4 G/DL (ref 6.4–8.2)
TROPONIN: <0.01 NG/ML
WBC # BLD: 3.4 K/UL (ref 4–11)

## 2021-12-02 PROCEDURE — 83605 ASSAY OF LACTIC ACID: CPT

## 2021-12-02 PROCEDURE — 85025 COMPLETE CBC W/AUTO DIFF WBC: CPT

## 2021-12-02 PROCEDURE — 99283 EMERGENCY DEPT VISIT LOW MDM: CPT

## 2021-12-02 PROCEDURE — 6370000000 HC RX 637 (ALT 250 FOR IP): Performed by: STUDENT IN AN ORGANIZED HEALTH CARE EDUCATION/TRAINING PROGRAM

## 2021-12-02 PROCEDURE — 93005 ELECTROCARDIOGRAM TRACING: CPT | Performed by: STUDENT IN AN ORGANIZED HEALTH CARE EDUCATION/TRAINING PROGRAM

## 2021-12-02 PROCEDURE — 84484 ASSAY OF TROPONIN QUANT: CPT

## 2021-12-02 PROCEDURE — 71045 X-RAY EXAM CHEST 1 VIEW: CPT

## 2021-12-02 PROCEDURE — 2580000003 HC RX 258: Performed by: STUDENT IN AN ORGANIZED HEALTH CARE EDUCATION/TRAINING PROGRAM

## 2021-12-02 PROCEDURE — 96361 HYDRATE IV INFUSION ADD-ON: CPT

## 2021-12-02 PROCEDURE — 94761 N-INVAS EAR/PLS OXIMETRY MLT: CPT

## 2021-12-02 PROCEDURE — 80053 COMPREHEN METABOLIC PANEL: CPT

## 2021-12-02 PROCEDURE — 36415 COLL VENOUS BLD VENIPUNCTURE: CPT

## 2021-12-02 PROCEDURE — 94640 AIRWAY INHALATION TREATMENT: CPT

## 2021-12-02 PROCEDURE — 6370000000 HC RX 637 (ALT 250 FOR IP): Performed by: NURSE PRACTITIONER

## 2021-12-02 PROCEDURE — 96360 HYDRATION IV INFUSION INIT: CPT

## 2021-12-02 RX ORDER — 0.9 % SODIUM CHLORIDE 0.9 %
1000 INTRAVENOUS SOLUTION INTRAVENOUS ONCE
Status: COMPLETED | OUTPATIENT
Start: 2021-12-02 | End: 2021-12-02

## 2021-12-02 RX ORDER — ALBUTEROL SULFATE 90 UG/1
2 AEROSOL, METERED RESPIRATORY (INHALATION) EVERY 4 HOURS PRN
Qty: 18 G | Refills: 0 | Status: SHIPPED | OUTPATIENT
Start: 2021-12-02 | End: 2022-01-10

## 2021-12-02 RX ORDER — ACETAMINOPHEN 500 MG
1000 TABLET ORAL ONCE
Status: COMPLETED | OUTPATIENT
Start: 2021-12-02 | End: 2021-12-02

## 2021-12-02 RX ORDER — ALBUTEROL SULFATE 90 UG/1
2 AEROSOL, METERED RESPIRATORY (INHALATION) ONCE
Status: COMPLETED | OUTPATIENT
Start: 2021-12-02 | End: 2021-12-02

## 2021-12-02 RX ORDER — IPRATROPIUM BROMIDE AND ALBUTEROL SULFATE 2.5; .5 MG/3ML; MG/3ML
1 SOLUTION RESPIRATORY (INHALATION) ONCE
Status: DISCONTINUED | OUTPATIENT
Start: 2021-12-02 | End: 2021-12-02

## 2021-12-02 RX ADMIN — SODIUM CHLORIDE 1000 ML: 9 INJECTION, SOLUTION INTRAVENOUS at 08:20

## 2021-12-02 RX ADMIN — Medication 2 PUFF: at 08:40

## 2021-12-02 RX ADMIN — ACETAMINOPHEN 1000 MG: 500 TABLET ORAL at 08:20

## 2021-12-02 ASSESSMENT — PAIN SCALES - GENERAL
PAINLEVEL_OUTOF10: 5
PAINLEVEL_OUTOF10: 0

## 2021-12-02 NOTE — ED PROVIDER NOTES
[address]  [address] [address]  Dept: [phone]  Loc: [address] ENCOUNTER        This patient was not seen or evaluated by the attending physician. I evaluated this patient, the attending physician was available for consultation. CHIEF COMPLAINT    Chief Complaint   Patient presents with    Concern For COVID-19     Covid + 7 days. Dizzy, light headed. Cough       HPI    Pascale Zhong is a 19 y.o. male who presents to the emergency department with complaints of just not feeling better after testing positive for Covid. His symptoms started 7 days ago. He states he is coughing so much that he is becoming lightheaded. He has not had any syncopal episodes. He denies body aches, fever, chills. He does wonder when he can go back to work. He denies nausea or vomiting, shortness of breath or chest pain. REVIEW OF SYSTEMS    Cardiac: no chest pain, no palpitations, no syncope  Respiratory: see HPI, + cough  Neurologic: no syncope or LOC  GI: no vomiting, no diarrhea  General: denies fever  All other systems reviewed and are negative. PAST MEDICAL & SURGICAL HISTORY    History reviewed. No pertinent past medical history. Past Surgical History:   Procedure Laterality Date    NOSE SURGERY         CURRENT MEDICATIONS  (may include discharge medications prescribed in the ED)  Current Outpatient Rx   Medication Sig Dispense Refill    albuterol sulfate HFA (PROVENTIL HFA) 108 (90 Base) MCG/ACT inhaler Inhale 2 puffs into the lungs every 4 hours as needed for Wheezing or Shortness of Breath (Space out to every 6 hours as symptoms improve) Space out to every 6 hours as symptoms improve.  18 g 0    predniSONE (DELTASONE) 10 MG tablet Take 3 tabs PO for 2 days, 2 tabs for 2 days, 1 tab for 2 days 12 tablet 0    clonazePAM (KLONOPIN) 1 MG tablet TAKE 1-2 TABLETS BY MOUTH EVERY NIGHT AT BEDTIME AS NEEDED 60 tablet 5    Melatonin 10 MG TABS 20mg nightly for sleep 60 tablet 11       ALLERGIES    No Known Allergies    SOCIAL & FAMILY HISTORY    Social History     Socioeconomic History    Marital status: Single     Spouse name: None    Number of children: None    Years of education: None    Highest education level: None   Occupational History    None   Tobacco Use    Smoking status: Never Smoker    Smokeless tobacco: Never Used   Vaping Use    Vaping Use: Never used   Substance and Sexual Activity    Alcohol use: Yes     Comment: Socially    Drug use: Not Currently     Types: Marijuana Clif[place] Kos)    Sexual activity: Yes     Partners: Female   Other Topics Concern    None   Social History Narrative    None     Social Determinants of Health     Financial Resource Strain:     Difficulty of Paying Living Expenses: Not on file   Food Insecurity:     Worried About Running Out of Food in the Last Year: Not on file    Dinah of Food in the Last Year: Not on file   Transportation Needs:     Lack of Transportation (Medical): Not on file    Lack of Transportation (Non-Medical):  Not on file   Physical Activity:     Days of Exercise per Week: Not on file    Minutes of Exercise per Session: Not on file   Stress:     Feeling of Stress : Not on file   Social Connections:     Frequency of Communication with Friends and Family: Not on file    Frequency of Social Gatherings with Friends and Family: Not on file    Attends Denominational Services: Not on file    Active Member of Clubs or Organizations: Not on file    Attends Club or Organization Meetings: Not on file    Marital Status: Not on file   Intimate Partner Violence:     Fear of Current or Ex-Partner: Not on file    Emotionally Abused: Not on file    Physically Abused: Not on file    Sexually Abused: Not on file   Housing Stability:     Unable to Pay for Housing in the Last Year: Not on file    Number of [place] in the Last Year: Not on file    Unstable Housing in the Last Year: Not on file     Family History   Problem Relation Age of Onset    Diabetes Father     High Blood Pressure Father     Alcohol Abuse Paternal Grandmother        PHYSICAL EXAM    VITAL SIGNS: /74   Pulse 93   Temp 99.2 °F (37.3 °C)   Resp 19   SpO2 97%    Constitutional:  Well developed, well nourished, no acute distress   HENT:  Atraumatic, moist mucus membranes  Neck: supple, no JVD   Respiratory: Breath sounds clear throughout auscultation. Respirations are even and unlabored. Cough is dry nonproductive. No distress. Cardiovascular: The rhythm and rate, S1-S2. No murmurs   vascular: Radial and DP pulses 2+ and equal bilaterally  GI:  Soft, nontender, normal bowel sounds  Musculoskeletal:  no lower extremity edema, no lower extremity asymmetry, no calf tenderness, no thigh tenderness, no acute deformities  Integument:  Skin is warm and dry, no petechiae   Neurologic:  Alert & oriented, no slurred speech  Psych: Pleasant affect, no hallucinations    EKG      EKG reviewed by myself and interpreted by Dr. Ulus Lennox. Ventricular rate 115 bpm, MA interval 134 ms, QRS duration 70 ms,  ms  No ST elevation or depression. No abnormal T wave inversions noted. Interpretation is a sinus tachycardia. No old EKG tracing found for comparison. RADIOLOGY/PROCEDURES    XR CHEST PORTABLE   Final Result   No acute process.            Labs Reviewed   CBC WITH AUTO DIFFERENTIAL - Abnormal; Notable for the following components:       Result Value    WBC 3.4 (*)     Lymphocytes Absolute 0.7 (*)     All other components within normal limits    Narrative:     Performed at:  [place] [address]   Phone [phone]   COMPREHENSIVE METABOLIC PANEL W/ REFLEX TO MG FOR LOW K - Abnormal; Notable for the following components:    Glucose 102 (*)     All other components within normal limits    Narrative: Performed at:  [facility]  [address] [place] [address]   Phone [phone]   LACTIC ACID, PLASMA    Narrative:     Performed at:  [facility]  [address] [place], [place] [address]   Phone [phone]   TROPONIN    Narrative:     Performed at:  [facility]  [address] [place], [place] [address]   Phone [phone]       ED COURSE & MEDICAL DECISION MAKING    Pertinent Labs & Imaging studies reviewed and interpreted. (See chart for details)    See chart for details of medications given during the ED stay. Vitals:    12/02/21 0945 12/02/21 1000 12/02/21 1015 12/02/21 1030   BP: 118/71 (!) 116/58 124/82 129/74   Pulse: 96 104 107 93   Resp: 14 17 21 19   Temp:       SpO2: 98% 98%  97%     Medications   0.9 % sodium chloride bolus (0 mLs IntraVENous Stopped 12/2/21 1037)   acetaminophen (TYLENOL) tablet 1,000 mg (1,000 mg Oral Given 12/2/21 0820)   albuterol sulfate  (90 Base) MCG/ACT inhaler 2 puff (2 puffs Inhalation Given 12/2/21 0840)     I have seen and evaluated this patient. My attending physician was available for consultation. Differential diagnosis includes but is not limited to pneumonia, Covid pneumonia, influenza, bronchitis, dehydration, electrolyte derangement, other. He is nontoxic in appearance and hemodynamically stable. He started with Covid symptoms 7 days ago. He has had a positive Covid test.    He cannot stop coughing. Chest x-ray is interpreted by radiology and reviewed by myself show no acute cardiopulmonary process. He is in no distress on arrival.  He is a white count of 3.4 with no anemia. Glucose is 102. CMP is unremarkable. Troponin is less than 0.01. EKG shows a sinus tachycardia. The sinus tachycardia did resolve after he got fluids. He was given Tylenol. He was given an inhaler to help with the cough.   I will send him home with incentive spirometer. He was instructed to follow-up with his PCP as needed, quarantine instructions and to return to the emergency department for worsening symptoms. Patient verbalized understanding of the discharge instructions. FINAL IMPRESSION    1. Cough    2.  COVID        PLAN  Discharge    (Please note that this note was completed with a voice recognition program.  Every attempt was made to edit the dictations, but inevitably there remain words that are mis-transcribed.)       JOZEF Marie - LORENZO  12/05/21 0586

## 2021-12-02 NOTE — ED NOTES
Patient c/o dizziness, lightheaded, worsening cough. Covid positive. Symptoms started 7 days ago. Tachycardic and diaphoretic in triage. Patient able to speak in full sentences, no respiratory distress noted. Ambulated without difficulty.       Natali Fuller RN  12/02/21 7511

## 2021-12-02 NOTE — ED PROVIDER NOTES
ATTENDING EKG INTERPRETATION    The Ekg interpreted by me shows  sinus tachycardia, sqan=733 bpm   Axis is   Normal  QTc is  normal  Intervals and Durations are unremarkable. ST Segments: nonspecific ST abnls. No prior EKG for comparison.         Cary Guerrier MD  12/02/21 0411

## 2021-12-03 LAB
EKG ATRIAL RATE: 115 BPM
EKG DIAGNOSIS: NORMAL
EKG P AXIS: 23 DEGREES
EKG P-R INTERVAL: 134 MS
EKG Q-T INTERVAL: 294 MS
EKG QRS DURATION: 70 MS
EKG QTC CALCULATION (BAZETT): 406 MS
EKG R AXIS: 30 DEGREES
EKG T AXIS: 39 DEGREES
EKG VENTRICULAR RATE: 115 BPM

## 2021-12-03 PROCEDURE — 93010 ELECTROCARDIOGRAM REPORT: CPT | Performed by: INTERNAL MEDICINE

## 2021-12-11 PROCEDURE — 4500000002 HC ER NO CHARGE

## 2021-12-12 ENCOUNTER — APPOINTMENT (OUTPATIENT)
Dept: GENERAL RADIOLOGY | Age: 21
End: 2021-12-12
Payer: COMMERCIAL

## 2021-12-12 ENCOUNTER — HOSPITAL ENCOUNTER (EMERGENCY)
Age: 21
Discharge: LEFT BEFORE DISCHARGE | End: 2021-12-12
Payer: COMMERCIAL

## 2021-12-12 VITALS
HEIGHT: 72 IN | DIASTOLIC BLOOD PRESSURE: 69 MMHG | RESPIRATION RATE: 18 BRPM | OXYGEN SATURATION: 100 % | TEMPERATURE: 98.7 F | HEART RATE: 96 BPM | WEIGHT: 192.68 LBS | SYSTOLIC BLOOD PRESSURE: 101 MMHG | BODY MASS INDEX: 26.1 KG/M2

## 2021-12-12 LAB
EKG ATRIAL RATE: 96 BPM
EKG DIAGNOSIS: NORMAL
EKG P AXIS: 22 DEGREES
EKG P-R INTERVAL: 132 MS
EKG Q-T INTERVAL: 308 MS
EKG QRS DURATION: 72 MS
EKG QTC CALCULATION (BAZETT): 389 MS
EKG R AXIS: 37 DEGREES
EKG T AXIS: 19 DEGREES
EKG VENTRICULAR RATE: 96 BPM
RAPID INFLUENZA  B AGN: NEGATIVE
RAPID INFLUENZA A AGN: NEGATIVE
SARS-COV-2, NAAT: DETECTED

## 2021-12-12 PROCEDURE — 87804 INFLUENZA ASSAY W/OPTIC: CPT

## 2021-12-12 PROCEDURE — 71045 X-RAY EXAM CHEST 1 VIEW: CPT

## 2021-12-12 PROCEDURE — 93010 ELECTROCARDIOGRAM REPORT: CPT | Performed by: INTERNAL MEDICINE

## 2021-12-12 PROCEDURE — 87635 SARS-COV-2 COVID-19 AMP PRB: CPT

## 2021-12-12 PROCEDURE — 93005 ELECTROCARDIOGRAM TRACING: CPT | Performed by: EMERGENCY MEDICINE

## 2021-12-13 DIAGNOSIS — F51.8 HYPNIC JERKS: ICD-10-CM

## 2021-12-13 DIAGNOSIS — G25.81 RLS (RESTLESS LEGS SYNDROME): ICD-10-CM

## 2021-12-13 RX ORDER — CLONAZEPAM 1 MG/1
TABLET ORAL
Qty: 60 TABLET | Refills: 0 | Status: SHIPPED | OUTPATIENT
Start: 2021-12-13 | End: 2022-01-10 | Stop reason: SDUPTHER

## 2021-12-21 ENCOUNTER — TELEPHONE (OUTPATIENT)
Dept: FAMILY MEDICINE CLINIC | Age: 21
End: 2021-12-21

## 2021-12-21 NOTE — TELEPHONE ENCOUNTER
Pt scheduled for ov tomorrow with Dr. Caren Jiang at noon. Pt spoke with office and is not currently having symptoms. Pt told office that he did another test and came back negative.      Please Advise if pt can be seen in person

## 2021-12-21 NOTE — TELEPHONE ENCOUNTER
It should be 10 total days of isolation (including tomorrow, day 10) since being positive so would need to switch to virtual.

## 2021-12-21 NOTE — TELEPHONE ENCOUNTER
Tomorrow will be 10 days since the positive test  Would you be okay seeing pt in office or would you rather schedule a VV   Please advise   Thank you

## 2021-12-22 ENCOUNTER — TELEMEDICINE (OUTPATIENT)
Dept: FAMILY MEDICINE CLINIC | Age: 21
End: 2021-12-22
Payer: COMMERCIAL

## 2021-12-22 DIAGNOSIS — M54.50 CHRONIC MIDLINE LOW BACK PAIN WITHOUT SCIATICA: ICD-10-CM

## 2021-12-22 DIAGNOSIS — M54.50 CHRONIC MIDLINE LOW BACK PAIN WITHOUT SCIATICA: Primary | ICD-10-CM

## 2021-12-22 DIAGNOSIS — G43.009 MIGRAINE WITHOUT AURA AND WITHOUT STATUS MIGRAINOSUS, NOT INTRACTABLE: ICD-10-CM

## 2021-12-22 DIAGNOSIS — Z86.16 HISTORY OF COVID-19: ICD-10-CM

## 2021-12-22 DIAGNOSIS — G89.29 CHRONIC MIDLINE LOW BACK PAIN WITHOUT SCIATICA: Primary | ICD-10-CM

## 2021-12-22 DIAGNOSIS — F41.9 ANXIETY: ICD-10-CM

## 2021-12-22 DIAGNOSIS — G89.29 CHRONIC MIDLINE LOW BACK PAIN WITHOUT SCIATICA: ICD-10-CM

## 2021-12-22 PROCEDURE — 99213 OFFICE O/P EST LOW 20 MIN: CPT | Performed by: FAMILY MEDICINE

## 2021-12-22 RX ORDER — SUMATRIPTAN 50 MG/1
TABLET, FILM COATED ORAL
Qty: 9 TABLET | Refills: 1 | Status: SHIPPED | OUTPATIENT
Start: 2021-12-22 | End: 2022-01-10

## 2021-12-22 RX ORDER — MELOXICAM 15 MG/1
15 TABLET ORAL DAILY PRN
Qty: 90 TABLET | Refills: 0 | Status: SHIPPED | OUTPATIENT
Start: 2021-12-22 | End: 2022-01-10

## 2021-12-22 RX ORDER — MELOXICAM 15 MG/1
15 TABLET ORAL DAILY PRN
Qty: 30 TABLET | Refills: 5 | Status: SHIPPED | OUTPATIENT
Start: 2021-12-22 | End: 2021-12-22

## 2021-12-22 NOTE — PROGRESS NOTES
12/22/2021    This is a 19 y.o. male   Chief Complaint   Patient presents with    Back Pain     Pt is having lower back pain that pt has been having for awhile. Pt said normally advil will help but doesnt seem to be doing much any more. HPI    Dx with COVID day after Thanksgiving. Went to ED on 12/2 and got checked out, normal CXR. Feeling better besides some residual congestion. Back pain  - intermittent hx of issues with back pain. Pulled something when working out in 2017. Over the past 3-4 months notes worsening low back pain. Not responding as well to NSAIDs as it did in the past. Did PT and has been working to do home exercises. Worse when sitting or standing for prolonged periods. Pain is non-radiating and doesn't travel down the legs. Talked to his PT, changed his exercise regimen and change in bedding as well about 2 months ago. MRI lumbar spine 7/25/18  T12-L1: There is no significant abnormality. L1-2: There is no focal disc protrusion, significant canal stenosis or foraminal narrowing. L2-3: There is no focal disc protrusion, significant canal stenosis or foraminal narrowing. L3-4: There is no focal disc protrusion, significant canal stenosis or foraminal narrowing. L4-5: There is no focal disc protrusion, significant canal stenosis or foraminal narrowing. L5-S1: There is no focal disc protrusion, significant canal stenosis or foraminal narrowing. IMPRESSION   Unremarkable MRI of the lumbar spine.     Review of Systems     Current Outpatient Medications   Medication Sig Dispense Refill    meloxicam (MOBIC) 15 MG tablet Take 1 tablet by mouth daily as needed for Pain 30 tablet 5    clonazePAM (KLONOPIN) 1 MG tablet TAKE 1-2 TABLETS BY MOUTH EVERY NIGHT AT BEDTIME AS NEEDED 60 tablet 0    albuterol sulfate HFA (PROVENTIL HFA) 108 (90 Base) MCG/ACT inhaler Inhale 2 puffs into the lungs every 4 hours as needed for Wheezing or Shortness of Breath (Space out to every 6 hours as symptoms improve) Space out to every 6 hours as symptoms improve. 18 g 0    predniSONE (DELTASONE) 10 MG tablet Take 3 tabs PO for 2 days, 2 tabs for 2 days, 1 tab for 2 days 12 tablet 0    Melatonin 10 MG TABS 20mg nightly for sleep 60 tablet 11     No current facility-administered medications for this visit. There were no vitals taken for this visit. Physical Exam    Wt Readings from Last 3 Encounters:   12/11/21 192 lb 10.9 oz (87.4 kg)   08/27/21 182 lb (82.6 kg)   07/07/21 179 lb 14.3 oz (81.6 kg)     BP Readings from Last 3 Encounters:   12/11/21 101/69   12/02/21 129/74   08/27/21 114/68     Assessment/Plan:  1. Chronic midline low back pain without sciatica  - meloxicam (MOBIC) 15 MG tablet; Take 1 tablet by mouth daily as needed for Pain  Dispense: 30 tablet; Refill: 5    Take meloxicam daily as needed. Review potential side effects of NSAIDs. Discussed using Tylenol but no other NSAIDs with this. Normal MRI results reviewed. He has been in touch with physical therapy and is doing home exercises routinely. Florinda Pinzon, was evaluated through a synchronous (real-time) audio-video encounter. The patient (or guardian if applicable) is aware that this is a billable service. Verbal consent to proceed has been obtained within the past 12 months. The visit was conducted pursuant to the emergency declaration under the [address] [place] [address] and the [employer] General Act. Patient identification was verified, and a caregiver was present when appropriate. The patient was located in a state where the provider was credentialed to provide care. Total time spent for this encounter: Not billed by time    --Jim Stewart MD on 12/22/2021 at 3:21 PM    An electronic signature was used to authenticate this note.

## 2022-01-10 ENCOUNTER — OFFICE VISIT (OUTPATIENT)
Dept: SLEEP MEDICINE | Age: 22
End: 2022-01-10
Payer: COMMERCIAL

## 2022-01-10 VITALS
HEIGHT: 72 IN | OXYGEN SATURATION: 98 % | DIASTOLIC BLOOD PRESSURE: 60 MMHG | HEART RATE: 82 BPM | SYSTOLIC BLOOD PRESSURE: 90 MMHG | RESPIRATION RATE: 20 BRPM | BODY MASS INDEX: 25.57 KG/M2 | WEIGHT: 188.8 LBS

## 2022-01-10 DIAGNOSIS — G25.81 RLS (RESTLESS LEGS SYNDROME): ICD-10-CM

## 2022-01-10 DIAGNOSIS — G47.52 REM SLEEP BEHAVIOR DISORDER: Primary | ICD-10-CM

## 2022-01-10 DIAGNOSIS — F51.8 HYPNIC JERKS: ICD-10-CM

## 2022-01-10 PROCEDURE — 99214 OFFICE O/P EST MOD 30 MIN: CPT | Performed by: PSYCHIATRY & NEUROLOGY

## 2022-01-10 RX ORDER — CLONAZEPAM 1 MG/1
TABLET ORAL
Qty: 60 TABLET | Refills: 5 | Status: SHIPPED | OUTPATIENT
Start: 2022-01-10 | End: 2022-07-11

## 2022-01-10 ASSESSMENT — ENCOUNTER SYMPTOMS
CHOKING: 0
APNEA: 0
ALLERGIC/IMMUNOLOGIC NEGATIVE: 1

## 2022-01-10 NOTE — PROGRESS NOTES
MD RYDER Coughlin Board Certified in Sleep Medicine  Certified in [address] [place] [address] Certified in Neurology [address] [place], [place] [address]BELEN [address]  [phone]   [address] [place] [address]                      [address]  [address] [address] [phone]    Subjective:     Patient ID: Sadaf Hardin is a 19 y.o. male. Chief Complaint   Patient presents with    Follow-up     6m       HPI:        Sadaf Hardin is a 19 y.o. male was seen today as a 6 months follow for REM sleep behavior disorder and RLS. No much hypnic jerk or moving during the REM with Klonopin 2mg  Quit the Melatonin over the month. Has seen a neurologist who agrees about the diagnosis and treatment plan with Klonopin.    DOT/CDL - N/A        Previous Report(s)Reviewed: historical medical records         Social History     Socioeconomic History    Marital status: Single     Spouse name: Not on file    Number of children: Not on file    Years of education: Not on file    Highest education level: Not on file   Occupational History    Not on file   Tobacco Use    Smoking status: Never Smoker    Smokeless tobacco: Never Used   Vaping Use    Vaping Use: Never used   Substance and Sexual Activity    Alcohol use: Yes     Comment: Socially    Drug use: Not Currently    Sexual activity: Yes     Partners: Female   Other Topics Concern    Not on file   Social History Narrative    Not on file     Social Determinants of Health     Financial Resource Strain:     Difficulty of Paying Living Expenses: Not on file   Food Insecurity:     Worried About Running Out of Food in the Last Year: Not on file    Dinah of Food in the Last Year: Not on file   Transportation Needs:     Lack of Transportation (Medical): Not on file    Lack of Transportation (Non-Medical): Not on file   Physical Activity:     Days of Exercise per Week: Not on file    Minutes of Exercise per Session: Not on file   Stress:     Feeling of Stress : Not on file   Social Connections:     Frequency of Communication with Friends and Family: Not on file    Frequency of Social Gatherings with Friends and Family: Not on file    Attends Restorationism Services: Not on file    Active Member of [address] [place] [address] [employer] or Organizations: Not on file    Attends Club or Organization Meetings: Not on file    Marital Status: Not on file   Intimate Partner Violence:     Fear of Current or Ex-Partner: Not on file    Emotionally Abused: Not on file    Physically Abused: Not on file    Sexually Abused: Not on file   Housing Stability:     Unable to Pay for Housing in the Last Year: Not on file    Number of [place] in the Last Year: Not on file    Unstable Housing in the Last Year: Not on file       Prior to Admission medications    Medication Sig Start Date End Date Taking? Authorizing Provider   clonazePAM (KLONOPIN) 1 MG tablet TAKE 1-2 TABLETS BY MOUTH EVERY NIGHT AT BEDTIME AS NEEDED 1/10/22 2/8/22 Yes Dav Hudson MD       Allergies as of 01/10/2022    (No Known Allergies)       Patient Active Problem List   Diagnosis    Attention deficit hyperactivity disorder, combined type    Hypnic jerks    REM sleep behavior disorder    Fasciculations - Neuro Dr. 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 No pertinent past medical history. Past Surgical History:   Procedure Laterality Date    NOSE SURGERY         Family History   Problem Relation Age of Onset    Diabetes Father     High Blood Pressure Father     Alcohol Abuse Paternal Grandmother        Review of Systems   Constitutional: Negative for fatigue.    HENT: Negative. Respiratory: Negative for apnea and choking. Cardiovascular: Negative for leg swelling. Skin: Negative. Allergic/Immunologic: Negative. Neurological: Negative. Psychiatric/Behavioral: Negative for agitation and dysphoric mood. The patient is not nervous/anxious. All other systems reviewed and are negative. Objective:     Vitals:  Weight BMI Neck circumference    Wt Readings from Last 3 Encounters:   01/10/22 188 lb 12.8 oz (85.6 kg)   12/11/21 192 lb 10.9 oz (87.4 kg)   08/27/21 182 lb (82.6 kg)    Body mass index is 25.61 kg/m². BP HR SaO2   BP Readings from Last 3 Encounters:   01/10/22 90/60   12/11/21 101/69   12/02/21 129/74    Pulse Readings from Last 3 Encounters:   01/10/22 82   12/11/21 96   12/02/21 93    SpO2 Readings from Last 3 Encounters:   01/10/22 98%   12/11/21 100%   12/02/21 97%        Themandibular molar Class :   [x]1 []2 []3      Mallampati I Airway Classification:   [x]1 []2 []3 []4      Physical Exam  Vitals and nursing note reviewed. Constitutional:       Appearance: Normal appearance. HENT:      Head: Atraumatic. Nose: Nose normal.      Mouth/Throat:      Mouth: Mucous membranes are moist.   Eyes:      Extraocular Movements: Extraocular movements intact. Cardiovascular:      Rate and Rhythm: Regular rhythm. Pulmonary:      Breath sounds: Normal breath sounds. Musculoskeletal:      Cervical back: Normal range of motion and neck supple. Skin:     General: Skin is warm. Neurological:      General: No focal deficit present. Coordination: Coordination normal.      Deep Tendon Reflexes: Reflexes normal.   Psychiatric:         Mood and Affect: Mood normal.         :      Diagnosis Orders   1. REM sleep behavior disorder  clonazePAM (KLONOPIN) 1 MG tablet   2. Hypnic jerks  clonazePAM (KLONOPIN) 1 MG tablet   3. RLS (restless legs syndrome)  clonazePAM (KLONOPIN) 1 MG tablet     Plan:      Will continue the Klonopin 1-2 mg QHS and Melatonin as needed. Drug contract today        No orders of the defined types were placed in this encounter. Return in about 6 months (around 7/10/2022).     Lamar Wallace MD  Medical Director - [facility]

## 2022-03-04 ENCOUNTER — OFFICE VISIT (OUTPATIENT)
Dept: FAMILY MEDICINE CLINIC | Age: 22
End: 2022-03-04
Payer: COMMERCIAL

## 2022-03-04 ENCOUNTER — HOSPITAL ENCOUNTER (OUTPATIENT)
Dept: GENERAL RADIOLOGY | Age: 22
Discharge: HOME | End: 2022-03-04
Payer: COMMERCIAL

## 2022-03-04 ENCOUNTER — HOSPITAL ENCOUNTER (OUTPATIENT)
Age: 22
Discharge: HOME | End: 2022-03-04
Payer: COMMERCIAL

## 2022-03-04 VITALS
RESPIRATION RATE: 16 BRPM | WEIGHT: 190 LBS | HEART RATE: 87 BPM | OXYGEN SATURATION: 97 % | SYSTOLIC BLOOD PRESSURE: 100 MMHG | DIASTOLIC BLOOD PRESSURE: 64 MMHG | BODY MASS INDEX: 25.77 KG/M2

## 2022-03-04 DIAGNOSIS — R93.89 ABNORMAL CHEST X-RAY: Primary | ICD-10-CM

## 2022-03-04 DIAGNOSIS — F44.4 FUNCTIONAL NEUROLOGICAL SYMPTOM DISORDER WITH ABNORMAL MOVEMENT: ICD-10-CM

## 2022-03-04 DIAGNOSIS — J02.9 SORE THROAT: Primary | ICD-10-CM

## 2022-03-04 DIAGNOSIS — R06.89 ALTERED BREATHING PATTERN: ICD-10-CM

## 2022-03-04 DIAGNOSIS — F41.9 ANXIETY: ICD-10-CM

## 2022-03-04 DIAGNOSIS — R09.82 POSTNASAL DRIP: ICD-10-CM

## 2022-03-04 PROCEDURE — 71046 X-RAY EXAM CHEST 2 VIEWS: CPT

## 2022-03-04 PROCEDURE — 99214 OFFICE O/P EST MOD 30 MIN: CPT | Performed by: FAMILY MEDICINE

## 2022-03-04 RX ORDER — LAMOTRIGINE 25 MG/1
TABLET ORAL
Qty: 120 TABLET | Refills: 1 | Status: SHIPPED | OUTPATIENT
Start: 2022-03-04 | End: 2022-07-11

## 2022-03-04 ASSESSMENT — PATIENT HEALTH QUESTIONNAIRE - PHQ9
SUM OF ALL RESPONSES TO PHQ QUESTIONS 1-9: 0
SUM OF ALL RESPONSES TO PHQ QUESTIONS 1-9: 0
SUM OF ALL RESPONSES TO PHQ9 QUESTIONS 1 & 2: 0
SUM OF ALL RESPONSES TO PHQ QUESTIONS 1-9: 0
1. LITTLE INTEREST OR PLEASURE IN DOING THINGS: 0
2. FEELING DOWN, DEPRESSED OR HOPELESS: 0
SUM OF ALL RESPONSES TO PHQ QUESTIONS 1-9: 0

## 2022-03-04 NOTE — PROGRESS NOTES
3/4/2022    This is a 19 y.o. male   Chief Complaint   Patient presents with    Neck Pain     Lt side of neck, sore throat    Other     feels like breathing has gotten worse over the past year     HPI    Here for concerns for a sore throat. Notable on the left side. This occurred in the fall. It resolved possibly on its own, did have antibiotics at the time. More recently it has recurred over the past week. Does not post-nasal drip and congestion during this time. Reports negative COVID test at home. It did hurt with swallowing at the beginning of the week, but this has improved some. No sensation of food getting stuck    Notes breathing changes. Over the past year, has noted having to be more aware of his breathing. This will happen sometimes when talking to others, like he needs to take a deep breath. Worse over the past month. Does not notice it when working out at the gym. No pain. He does wake up sometimes feeling like he needs to take a deep breath. Sees Dr. Carlos Maldonado for REM sleep disorder for which he takes clonazepam.   - no personal hx of asthma. Possible family hx of asthma in father    Functional neurologic disorder  -Continues to have intrusive daytime symptoms. Has been evaluated by neurology in the past.  Is curious to know if there are other medication options. Review of Systems     Current Outpatient Medications   Medication Sig Dispense Refill    lamoTRIgine (LAMICTAL) 25 MG tablet Weeks 1 and 2: 25 mg once daily; Weeks 3 and 4: 50 mg once daily; Week 5: 100mg daily 120 tablet 1    clonazePAM (KLONOPIN) 1 MG tablet TAKE 1-2 TABLETS BY MOUTH EVERY NIGHT AT BEDTIME AS NEEDED 60 tablet 5     No current facility-administered medications for this visit.        /64 (Site: Right Upper Arm, Position: Sitting, Cuff Size: Large Adult)   Pulse 87   Resp 16   Wt 190 lb (86.2 kg)   SpO2 97%   BMI 25.77 kg/m²     Physical Exam  Postnasal drip noted with pharyngeal erythema  TMs normal bilaterally  Lungs clear to auscultation bilaterally  Heart regular rate and rhythm    Wt Readings from Last 3 Encounters:   03/04/22 190 lb (86.2 kg)   01/10/22 188 lb 12.8 oz (85.6 kg)   12/11/21 192 lb 10.9 oz (87.4 kg)     BP Readings from Last 3 Encounters:   03/04/22 100/64   01/10/22 90/60   12/11/21 101/69     Assessment/Plan:  1. Sore throat  Likely from postnasal drip. This is been present for the past week accompanied by the symptoms. We discussed the use of an antihistamine daily as allergy season approaches    2. Postnasal drip  As above    3. Altered breathing pattern  Etiology unclear. Present for quite a while, possibly worsened by Covid a few months ago. Check chest x-ray  - XR CHEST (2 VW); Future    4. Anxiety  We discussed the relationship between this and functional neurologic symptom disorder. He has had some significant side effects with SSRIs in the past.  Did not have improvement with BuSpar. We will try trial Lamictal and reviewed potential side effects, specifically to call and stop medication if experiencing a rash. - lamoTRIgine (LAMICTAL) 25 MG tablet; Weeks 1 and 2: 25 mg once daily; Weeks 3 and 4: 50 mg once daily; Week 5: 100mg daily  Dispense: 120 tablet; Refill: 1    5. Functional neurological symptom disorder with abnormal movement - eval'd by Neuro  Still having troublesome intrusive symptoms during the day. Start Lamictal as above      Return if symptoms worsen or fail to improve, for 6-8 weeks medication f/u.

## 2022-03-18 ENCOUNTER — HOSPITAL ENCOUNTER (OUTPATIENT)
Dept: CT IMAGING | Age: 22
Discharge: HOME | End: 2022-03-18
Payer: COMMERCIAL

## 2022-03-18 DIAGNOSIS — R93.89 ABNORMAL CHEST X-RAY: ICD-10-CM

## 2022-03-18 PROCEDURE — 71250 CT THORAX DX C-: CPT

## 2022-05-22 ENCOUNTER — APPOINTMENT (OUTPATIENT)
Dept: CT IMAGING | Age: 22
End: 2022-05-22
Payer: COMMERCIAL

## 2022-05-22 ENCOUNTER — HOSPITAL ENCOUNTER (EMERGENCY)
Age: 22
Discharge: HOME | End: 2022-05-22
Attending: EMERGENCY MEDICINE
Payer: COMMERCIAL

## 2022-05-22 VITALS
TEMPERATURE: 98.5 F | OXYGEN SATURATION: 100 % | DIASTOLIC BLOOD PRESSURE: 78 MMHG | HEART RATE: 94 BPM | BODY MASS INDEX: 25.07 KG/M2 | HEIGHT: 72 IN | RESPIRATION RATE: 14 BRPM | SYSTOLIC BLOOD PRESSURE: 153 MMHG | WEIGHT: 185.1 LBS

## 2022-05-22 DIAGNOSIS — S00.81XA FACIAL ABRASION, INITIAL ENCOUNTER: ICD-10-CM

## 2022-05-22 DIAGNOSIS — S09.90XA CLOSED HEAD INJURY, INITIAL ENCOUNTER: Primary | ICD-10-CM

## 2022-05-22 PROCEDURE — 6370000000 HC RX 637 (ALT 250 FOR IP): Performed by: EMERGENCY MEDICINE

## 2022-05-22 PROCEDURE — 70450 CT HEAD/BRAIN W/O DYE: CPT

## 2022-05-22 PROCEDURE — 99284 EMERGENCY DEPT VISIT MOD MDM: CPT

## 2022-05-22 RX ORDER — CLONAZEPAM 2 MG/1
2 TABLET ORAL 2 TIMES DAILY PRN
COMMUNITY
End: 2022-07-11 | Stop reason: SDUPTHER

## 2022-05-22 RX ORDER — ACETAMINOPHEN 500 MG
1000 TABLET ORAL ONCE
Status: COMPLETED | OUTPATIENT
Start: 2022-05-22 | End: 2022-05-22

## 2022-05-22 RX ADMIN — ACETAMINOPHEN 1000 MG: 500 TABLET ORAL at 21:10

## 2022-05-22 ASSESSMENT — PAIN DESCRIPTION - LOCATION: LOCATION: FACE;HEAD

## 2022-05-22 ASSESSMENT — PAIN - FUNCTIONAL ASSESSMENT: PAIN_FUNCTIONAL_ASSESSMENT: 0-10

## 2022-05-22 ASSESSMENT — PAIN SCALES - GENERAL: PAINLEVEL_OUTOF10: 7

## 2022-05-22 NOTE — Clinical Note
Mami Becker was seen and treated in our emergency department on 5/22/2022. He may return to work on 05/25/2022. If you have any questions or concerns, please don't hesitate to call.       Demetria Crockett MD
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
## 2022-07-11 ENCOUNTER — OFFICE VISIT (OUTPATIENT)
Dept: SLEEP MEDICINE | Age: 22
End: 2022-07-11
Payer: COMMERCIAL

## 2022-07-11 VITALS
HEART RATE: 85 BPM | WEIGHT: 192.8 LBS | SYSTOLIC BLOOD PRESSURE: 95 MMHG | DIASTOLIC BLOOD PRESSURE: 60 MMHG | TEMPERATURE: 98.4 F | OXYGEN SATURATION: 99 % | HEIGHT: 72 IN | BODY MASS INDEX: 26.11 KG/M2 | RESPIRATION RATE: 20 BRPM

## 2022-07-11 DIAGNOSIS — G25.81 RLS (RESTLESS LEGS SYNDROME): ICD-10-CM

## 2022-07-11 DIAGNOSIS — G47.33 OSA (OBSTRUCTIVE SLEEP APNEA): ICD-10-CM

## 2022-07-11 DIAGNOSIS — G47.52 REM SLEEP BEHAVIOR DISORDER: Primary | ICD-10-CM

## 2022-07-11 DIAGNOSIS — F51.8 HYPNIC JERKS: ICD-10-CM

## 2022-07-11 PROCEDURE — 99214 OFFICE O/P EST MOD 30 MIN: CPT | Performed by: PSYCHIATRY & NEUROLOGY

## 2022-07-11 RX ORDER — CLONAZEPAM 2 MG/1
2 TABLET ORAL 2 TIMES DAILY PRN
Qty: 60 TABLET | Refills: 5 | Status: SHIPPED | OUTPATIENT
Start: 2022-07-11 | End: 2023-01-07

## 2022-07-11 ASSESSMENT — SLEEP AND FATIGUE QUESTIONNAIRES
HOW LIKELY ARE YOU TO NOD OFF OR FALL ASLEEP WHILE WATCHING TV: 1
ESS TOTAL SCORE: 6
HOW LIKELY ARE YOU TO NOD OFF OR FALL ASLEEP WHILE SITTING AND READING: 0
HOW LIKELY ARE YOU TO NOD OFF OR FALL ASLEEP WHILE SITTING INACTIVE IN A PUBLIC PLACE: 0
HOW LIKELY ARE YOU TO NOD OFF OR FALL ASLEEP WHILE SITTING QUIETLY AFTER LUNCH WITHOUT ALCOHOL: 1
HOW LIKELY ARE YOU TO NOD OFF OR FALL ASLEEP IN A CAR, WHILE STOPPED FOR A FEW MINUTES IN TRAFFIC: 0
HOW LIKELY ARE YOU TO NOD OFF OR FALL ASLEEP WHILE SITTING AND TALKING TO SOMEONE: 0
HOW LIKELY ARE YOU TO NOD OFF OR FALL ASLEEP WHILE LYING DOWN TO REST IN THE AFTERNOON WHEN CIRCUMSTANCES PERMIT: 2
HOW LIKELY ARE YOU TO NOD OFF OR FALL ASLEEP WHEN YOU ARE A PASSENGER IN A CAR FOR AN HOUR WITHOUT A BREAK: 2

## 2022-07-11 NOTE — PROGRESS NOTES
MD RYDER Ramirez Board Certified in Sleep Medicine  Certified in [address] [place] [address] Certified in Neurology Shaun Bri Lowe [place] [address] [place] [address] Allen Sullivan [phone]   [address] [place] [address]                      [address]  [address] [address] [phone]    Subjective:     Patient ID: Claude Mckeon is a 23 y.o. male. Chief Complaint   Patient presents with    Follow-up       HPI:        Claude Mckeon is a 23 y.o. male was seen today as a follow for REM sleep behavior disorder and RLS. The Patient scored Total score: 6 on [place] Sleepiness Scale ( more than 10 is indicative of daytime sleepiness)     No much hypnic jerk or moving during the REM with Klonopin 2mg. Occasional he has to take 3 mg , but of the night 1-2 mg. DOT/CDL - N/A    Some load breathing with rare chocking.       Previous Report(s)Reviewed: historical medical records         Social History     Socioeconomic History    Marital status: Single     Spouse name: Not on file    Number of children: Not on file    Years of education: Not on file    Highest education level: Not on file   Occupational History    Not on file   Tobacco Use    Smoking status: Never Smoker    Smokeless tobacco: Never Used   Vaping Use    Vaping Use: Never used   Substance and Sexual Activity    Alcohol use: Yes     Comment: Socially    Drug use: Not Currently    Sexual activity: Yes     Partners: Female   Other Topics Concern    Not on file   Social History Narrative    Not on file     Social Determinants of Health     Financial Resource Strain:     Difficulty of Paying Living Expenses: Not on file   Food Insecurity:     Worried About Running Out of Food in the Last Year: Not on file    [address] in the Last Pressure Father     Sleep Apnea Father     Alcohol Abuse Paternal Grandmother        Review of Systems    Objective:     Vitals:  Weight BMI Neck circumference    Wt Readings from Last 3 Encounters:   07/11/22 192 lb 12.8 oz (87.5 kg)   05/22/22 185 lb 1.6 oz (84 kg)   03/04/22 190 lb (86.2 kg)    Body mass index is 26.15 kg/m². BP HR SaO2   BP Readings from Last 3 Encounters:   07/11/22 95/60   05/22/22 (!) 153/78   03/04/22 100/64    Pulse Readings from Last 3 Encounters:   07/11/22 85   05/22/22 94   03/04/22 87    SpO2 Readings from Last 3 Encounters:   07/11/22 99%   05/22/22 100%   03/04/22 97%        Themandibular molar Class :   [x]1 []2 []3      Mallampati I Airway Classification:   [x]1 []2 []3 []4      Physical Exam  Vitals and nursing note reviewed. Constitutional:       Appearance: Normal appearance. HENT:      Head: Atraumatic. Mouth/Throat:      Mouth: Mucous membranes are dry. Cardiovascular:      Rate and Rhythm: Normal rate and regular rhythm. Pulmonary:      Effort: Pulmonary effort is normal.      Breath sounds: Normal breath sounds. Musculoskeletal:         General: Normal range of motion. Skin:     General: Skin is warm. Neurological:      General: No focal deficit present.         :   In the last few months some load breathing and chocking. Could be emerging apnea with Klonopin      Diagnosis Orders   1. REM sleep behavior disorder  clonazePAM (KLONOPIN) 2 MG tablet   2. Hypnic jerks  clonazePAM (KLONOPIN) 2 MG tablet   3. RLS (restless legs syndrome)  clonazePAM (KLONOPIN) 2 MG tablet   4. PARAG (obstructive sleep apnea)  Home Sleep Study     Plan:   Drug contract for Klonopin   [place] report reviewed and is consistent with the plan of care. The patient understands the risks of dependency and addiction with above medication. Plan to take lowest therapeutic dose possible and discontinue as soon as possible.    Yearly OARRS report, controlled substance agreement and urine tox screen. Will do HST to check for PARAG. Orders Placed This Encounter   Procedures    Home Sleep Study       Return in about 6 months (around 1/11/2023).     Sony Ford MD  Medical Director - TELECARE [facility]

## 2022-09-05 ENCOUNTER — APPOINTMENT (OUTPATIENT)
Dept: GENERAL RADIOLOGY | Age: 22
End: 2022-09-05
Payer: COMMERCIAL

## 2022-09-05 ENCOUNTER — HOSPITAL ENCOUNTER (EMERGENCY)
Age: 22
Discharge: HOME | End: 2022-09-05
Attending: EMERGENCY MEDICINE
Payer: COMMERCIAL

## 2022-09-05 VITALS
SYSTOLIC BLOOD PRESSURE: 145 MMHG | HEIGHT: 72 IN | BODY MASS INDEX: 26.55 KG/M2 | WEIGHT: 196 LBS | HEART RATE: 98 BPM | OXYGEN SATURATION: 96 % | TEMPERATURE: 98.5 F | RESPIRATION RATE: 14 BRPM | DIASTOLIC BLOOD PRESSURE: 81 MMHG

## 2022-09-05 DIAGNOSIS — M26.609 TMJ (TEMPOROMANDIBULAR JOINT DISORDER): ICD-10-CM

## 2022-09-05 DIAGNOSIS — S00.83XA CONTUSION OF JAW, INITIAL ENCOUNTER: Primary | ICD-10-CM

## 2022-09-05 PROCEDURE — 70110 X-RAY EXAM OF JAW 4/> VIEWS: CPT

## 2022-09-05 PROCEDURE — 99283 EMERGENCY DEPT VISIT LOW MDM: CPT

## 2022-09-05 RX ORDER — METHOCARBAMOL 750 MG/1
750 TABLET, FILM COATED ORAL NIGHTLY PRN
Qty: 15 TABLET | Refills: 0 | Status: SHIPPED | OUTPATIENT
Start: 2022-09-05 | End: 2022-10-27

## 2022-09-05 RX ORDER — METHOCARBAMOL 750 MG/1
750 TABLET, FILM COATED ORAL 3 TIMES DAILY
Qty: 15 TABLET | Refills: 0 | Status: SHIPPED | OUTPATIENT
Start: 2022-09-05 | End: 2022-09-10

## 2022-09-05 ASSESSMENT — PAIN DESCRIPTION - LOCATION: LOCATION: JAW

## 2022-09-05 ASSESSMENT — PAIN DESCRIPTION - PAIN TYPE: TYPE: ACUTE PAIN

## 2022-09-05 ASSESSMENT — PAIN DESCRIPTION - ORIENTATION: ORIENTATION: LEFT

## 2022-09-05 ASSESSMENT — PAIN DESCRIPTION - FREQUENCY: FREQUENCY: CONTINUOUS

## 2022-09-05 ASSESSMENT — PAIN SCALES - GENERAL: PAINLEVEL_OUTOF10: 5

## 2022-09-05 ASSESSMENT — PAIN DESCRIPTION - DESCRIPTORS: DESCRIPTORS: ACHING

## 2022-09-05 ASSESSMENT — PAIN - FUNCTIONAL ASSESSMENT: PAIN_FUNCTIONAL_ASSESSMENT: 0-10
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
## 2022-09-05 NOTE — ED NOTES
Patient prepared for and ready to be discharged. Patient discharged at this time in no acute distress after verbalizing understanding of discharge instructions. Patient left after receiving After Visit Summary instructions.         Radha Gardner RN  09/05/22 5769
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
## 2022-10-19 ENCOUNTER — TELEPHONE (OUTPATIENT)
Dept: FAMILY MEDICINE CLINIC | Age: 22
End: 2022-10-19

## 2022-10-27 ENCOUNTER — OFFICE VISIT (OUTPATIENT)
Dept: PRIMARY CARE CLINIC | Age: 22
End: 2022-10-27
Payer: COMMERCIAL

## 2022-10-27 VITALS
SYSTOLIC BLOOD PRESSURE: 121 MMHG | HEART RATE: 89 BPM | BODY MASS INDEX: 28.17 KG/M2 | OXYGEN SATURATION: 97 % | DIASTOLIC BLOOD PRESSURE: 78 MMHG | HEIGHT: 72 IN | WEIGHT: 208 LBS

## 2022-10-27 DIAGNOSIS — F17.290 VAPING NICOTINE DEPENDENCE, TOBACCO PRODUCT: ICD-10-CM

## 2022-10-27 DIAGNOSIS — R07.9 CHEST PAIN, UNSPECIFIED TYPE: ICD-10-CM

## 2022-10-27 DIAGNOSIS — Z13.220 SCREENING FOR HYPERLIPIDEMIA: ICD-10-CM

## 2022-10-27 DIAGNOSIS — F41.9 ANXIETY: ICD-10-CM

## 2022-10-27 DIAGNOSIS — F32.1 CURRENT MODERATE EPISODE OF MAJOR DEPRESSIVE DISORDER WITHOUT PRIOR EPISODE (HCC): ICD-10-CM

## 2022-10-27 DIAGNOSIS — Z00.00 ENCOUNTER FOR WELL ADULT EXAM WITHOUT ABNORMAL FINDINGS: Primary | ICD-10-CM

## 2022-10-27 PROCEDURE — 93000 ELECTROCARDIOGRAM COMPLETE: CPT | Performed by: FAMILY MEDICINE

## 2022-10-27 PROCEDURE — 99214 OFFICE O/P EST MOD 30 MIN: CPT | Performed by: FAMILY MEDICINE

## 2022-10-27 PROCEDURE — 99395 PREV VISIT EST AGE 18-39: CPT | Performed by: FAMILY MEDICINE

## 2022-10-27 SDOH — HEALTH STABILITY: PHYSICAL HEALTH: ON AVERAGE, HOW MANY DAYS PER WEEK DO YOU ENGAGE IN MODERATE TO STRENUOUS EXERCISE (LIKE A BRISK WALK)?: 4 DAYS

## 2022-10-27 SDOH — ECONOMIC STABILITY: FOOD INSECURITY: WITHIN THE PAST 12 MONTHS, THE FOOD YOU BOUGHT JUST DIDN'T LAST AND YOU DIDN'T HAVE MONEY TO GET MORE.: NEVER TRUE

## 2022-10-27 SDOH — ECONOMIC STABILITY: FOOD INSECURITY: WITHIN THE PAST 12 MONTHS, YOU WORRIED THAT YOUR FOOD WOULD RUN OUT BEFORE YOU GOT MONEY TO BUY MORE.: NEVER TRUE

## 2022-10-27 SDOH — HEALTH STABILITY: PHYSICAL HEALTH: ON AVERAGE, HOW MANY MINUTES DO YOU ENGAGE IN EXERCISE AT THIS LEVEL?: 60 MIN

## 2022-10-27 ASSESSMENT — ANXIETY QUESTIONNAIRES
7. FEELING AFRAID AS IF SOMETHING AWFUL MIGHT HAPPEN: 1
GAD7 TOTAL SCORE: 16
3. WORRYING TOO MUCH ABOUT DIFFERENT THINGS: 3
6. BECOMING EASILY ANNOYED OR IRRITABLE: 2
2. NOT BEING ABLE TO STOP OR CONTROL WORRYING: 1
IF YOU CHECKED OFF ANY PROBLEMS ON THIS QUESTIONNAIRE, HOW DIFFICULT HAVE THESE PROBLEMS MADE IT FOR YOU TO DO YOUR WORK, TAKE CARE OF THINGS AT HOME, OR GET ALONG WITH OTHER PEOPLE: SOMEWHAT DIFFICULT
1. FEELING NERVOUS, ANXIOUS, OR ON EDGE: 3
4. TROUBLE RELAXING: 3
5. BEING SO RESTLESS THAT IT IS HARD TO SIT STILL: 3

## 2022-10-27 ASSESSMENT — PATIENT HEALTH QUESTIONNAIRE - GENERAL
IN THE PAST YEAR HAVE YOU FELT DEPRESSED OR SAD MOST DAYS, EVEN IF YOU FELT OKAY SOMETIMES?: NO
HAS THERE BEEN A TIME IN THE PAST MONTH WHEN YOU HAVE HAD SERIOUS THOUGHTS ABOUT ENDING YOUR LIFE?: NO
HAVE YOU EVER, IN YOUR WHOLE LIFE, TRIED TO KILL YOURSELF OR MADE A SUICIDE ATTEMPT?: NO

## 2022-10-27 ASSESSMENT — PATIENT HEALTH QUESTIONNAIRE - PHQ9
SUM OF ALL RESPONSES TO PHQ QUESTIONS 1-9: 16
SUM OF ALL RESPONSES TO PHQ9 QUESTIONS 1 & 2: 2
2. FEELING DOWN, DEPRESSED OR HOPELESS: 1
3. TROUBLE FALLING OR STAYING ASLEEP: 3
8. MOVING OR SPEAKING SO SLOWLY THAT OTHER PEOPLE COULD HAVE NOTICED. OR THE OPPOSITE, BEING SO FIGETY OR RESTLESS THAT YOU HAVE BEEN MOVING AROUND A LOT MORE THAN USUAL: 2
5. POOR APPETITE OR OVEREATING: 3
10. IF YOU CHECKED OFF ANY PROBLEMS, HOW DIFFICULT HAVE THESE PROBLEMS MADE IT FOR YOU TO DO YOUR WORK, TAKE CARE OF THINGS AT HOME, OR GET ALONG WITH OTHER PEOPLE: VERY DIFFICULT
6. FEELING BAD ABOUT YOURSELF - OR THAT YOU ARE A FAILURE OR HAVE LET YOURSELF OR YOUR FAMILY DOWN: 2
9. THOUGHTS THAT YOU WOULD BE BETTER OFF DEAD, OR OF HURTING YOURSELF: 0
SUM OF ALL RESPONSES TO PHQ QUESTIONS 1-9: 16
1. LITTLE INTEREST OR PLEASURE IN DOING THINGS: 1
4. FEELING TIRED OR HAVING LITTLE ENERGY: 2
7. TROUBLE CONCENTRATING ON THINGS, SUCH AS READING THE NEWSPAPER OR WATCHING TELEVISION: 2
SUM OF ALL RESPONSES TO PHQ QUESTIONS 1-9: 16
SUM OF ALL RESPONSES TO PHQ QUESTIONS 1-9: 16

## 2022-10-27 ASSESSMENT — SOCIAL DETERMINANTS OF HEALTH (SDOH)
HOW HARD IS IT FOR YOU TO PAY FOR THE VERY BASICS LIKE FOOD, HOUSING, MEDICAL CARE, AND HEATING?: NOT HARD AT ALL
WITHIN THE LAST YEAR, HAVE YOU BEEN KICKED, HIT, SLAPPED, OR OTHERWISE PHYSICALLY HURT BY YOUR PARTNER OR EX-PARTNER?: NO
WITHIN THE LAST YEAR, HAVE TO BEEN RAPED OR FORCED TO HAVE ANY KIND OF SEXUAL ACTIVITY BY YOUR PARTNER OR EX-PARTNER?: NO
WITHIN THE LAST YEAR, HAVE YOU BEEN HUMILIATED OR EMOTIONALLY ABUSED IN OTHER WAYS BY YOUR PARTNER OR EX-PARTNER?: NO
WITHIN THE LAST YEAR, HAVE YOU BEEN AFRAID OF YOUR PARTNER OR EX-PARTNER?: NO

## 2022-10-27 NOTE — PROGRESS NOTES
Chief Complaint   Patient presents with    Anxiety    ADHD    Pharyngitis       HPI:Jr presents for evaluation and management of well adult check, neck pain, anxiety, vaping use. He notes he has not been exercising much but recently joined a gym. He only gets 6 to 7 hours asleep at night. He drinks 2-3 times a week on weekends anywhere from 6-10 beers at a time. He does not eat vegetables consistently. He notes he has a history of anxiety and ADHD. He has been treated with Prozac and Wellbutrin in the past and these did not work well. He notes his anxiety and sleep are very disruptive. He does not think he is very depressed. PHQ Scores 10/27/2022 3/4/2022 2/11/2021   PHQ2 Score 2 0 0   PHQ9 Score 16 0 0     Interpretation of Total Score Depression Severity: 1-4 = Minimal depression, 5-9 = Mild depression, 10-14 = Moderate depression, 15-19 = Moderately severe depression, 20-27 = Severe depression  ERIKA 7 SCORE 10/27/2022   ERIKA-7 Total Score 16     Interpretation of ERIKA-7 score: 5-9 = mild anxiety, 10-14 = moderate anxiety, 15+ = severe anxiety. Recommend referral to behavioral health for scores 10 or greater. He notes a several day history of left-sided neck pain and chest pain. Notes it is tender to touch. Does not have exertional intolerance or dyspnea. He vapes and notes he has some difficulty breathing. He is planning on quitting vaping because he knows it is not helping him.   With his breathing    Review of Systems    No Known Allergies  New Prescriptions    SERTRALINE (ZOLOFT) 50 MG TABLET    Take 1 tablet by mouth daily     Current Outpatient Medications   Medication Sig Dispense Refill    sertraline (ZOLOFT) 50 MG tablet Take 1 tablet by mouth daily 30 tablet 5    methocarbamol (ROBAXIN-750) 750 MG tablet Take 1 tablet by mouth nightly as needed (muscle spasm) 15 tablet 0    clonazePAM (KLONOPIN) 2 MG tablet Take 1 tablet by mouth 2 times daily as needed (1/2-1 tabs QHS PRN) for up to 180 days. (Patient not taking: Reported on 10/27/2022) 60 tablet 5     No current facility-administered medications for this visit. Past Medical History:   Diagnosis Date    Anxiety 2/11/2021    Attention deficit hyperactivity disorder, combined type 6/9/2010    Atypical tic disorder     2/2 adderall:  seen at [facility] Neurologic Functional Disorder    Hypnic jerks 8/31/2020    REM sleep behavior disorder      Past Surgical History:   Procedure Laterality Date    NASAL SEPTUM SURGERY       Family History   Problem Relation Age of Onset    Diabetes Father     High Blood Pressure Father     Sleep Apnea Father     Alcohol Abuse Paternal Grandmother      Social History     Tobacco Use    Smoking status: Never    Smokeless tobacco: Never   Vaping Use    Vaping Use: Every day    Substances: Nicotine   Substance Use Topics    Alcohol use: Yes     Alcohol/week: 6.0 - 20.0 standard drinks     Types: 6 - 20 Standard drinks or equivalent per week     Comment: Socially    Drug use: Not Currently     Types: Marijuana (Weed)       Objective   /78   Pulse 89   Ht 6' (1.829 m)   Wt 208 lb (94.3 kg)   SpO2 97%   BMI 28.21 kg/m²   Wt Readings from Last 3 Encounters:   10/27/22 208 lb (94.3 kg)   09/05/22 196 lb (88.9 kg)   07/11/22 192 lb 12.8 oz (87.5 kg)       Physical Exam  Constitutional:       Appearance: He is well-developed. HENT:      Head: Normocephalic and atraumatic. Nose: Nose normal.      Mouth/Throat:      Pharynx: No oropharyngeal exudate. Eyes:      General: No scleral icterus. Right eye: No discharge. Left eye: No discharge. Pupils: Pupils are equal, round, and reactive to light. Neck:      Thyroid: No thyromegaly. Cardiovascular:      Rate and Rhythm: Normal rate and regular rhythm. Pulses:           Dorsalis pedis pulses are 2+ on the right side and 2+ on the left side. Posterior tibial pulses are 2+ on the right side and 2+ on the left side.       Heart sounds: Normal heart sounds. No murmur heard. No friction rub. No gallop. Comments: No Edema Lower Extremities  Pulmonary:      Effort: Pulmonary effort is normal.      Breath sounds: Normal breath sounds. No wheezing or rales. Abdominal:      General: Bowel sounds are normal. There is no distension. Palpations: Abdomen is soft. There is no hepatomegaly or splenomegaly. Tenderness: There is no abdominal tenderness. There is no guarding or rebound. Musculoskeletal:         General: No tenderness or deformity. Normal range of motion. Cervical back: Normal range of motion and neck supple. Lymphadenopathy:      Cervical: No cervical adenopathy. Skin:     General: Skin is warm and dry. Findings: No erythema or rash. Neurological:      Mental Status: He is alert. Cranial Nerves: No cranial nerve deficit. Sensory: No sensory deficit. Gait: Gait normal.   Psychiatric:         Speech: Speech normal.         Behavior: Behavior normal.         Chemistry        Component Value Date/Time     12/02/2021 0922    K 4.0 12/02/2021 0922     12/02/2021 0922    CO2 27 12/02/2021 0922    BUN 16 12/02/2021 0922    CREATININE 1.0 12/02/2021 0922        Component Value Date/Time    CALCIUM 8.8 12/02/2021 0922    ALKPHOS 51 12/02/2021 0922    AST 31 12/02/2021 0922    ALT 26 12/02/2021 0922    BILITOT 0.3 12/02/2021 0922          Lab Results   Component Value Date    WBC 3.4 (L) 12/02/2021    HGB 15.1 12/02/2021    HCT 45.7 12/02/2021    MCV 87.9 12/02/2021     12/02/2021     No results found for: LABA1C  No results found for: EAG  No results found for: LABA1C  No components found for: CHLPL  No results found for: TRIG  No results found for: HDL  No results found for: LDLCALC  No results found for: LABVLDL      Assessment   Plan   1.  Encounter for well adult exam without abnormal findings  Appears well:  Counselled diet, development, anticipatory guidance and safety issues. 2. Chest pain, unspecified type  ECG today is reassuring. We will monitor. Counseled patient to quit vaping and we will follow-up in 1 month to see how he is doing. May need further evaluation with pulmonary testing  - EKG 12 Lead - Clinic Performed    3. Screening for hyperlipidemia    - Lipid Panel; Future  - Comprehensive Metabolic Panel; Future    4. Current moderate episode of major depressive disorder without prior episode ([address]  New diagnosis. Patient stated he did not feel depressed. He did have a positive PHQ screening today we will refer to psychology and start patient on Zoloft follow-up 1 month  - Ambulatory referral to Psychology  - sertraline (ZOLOFT) 50 MG tablet; Take 1 tablet by mouth daily  Dispense: 30 tablet; Refill: 5    5. Anxiety  Chronic: We will trial Zoloft and follow-up in 1 month  - Ambulatory referral to Psychology  - sertraline (ZOLOFT) 50 MG tablet; Take 1 tablet by mouth daily  Dispense: 30 tablet; Refill: 5    6. Vaping nicotine dependence, tobacco product  Counseled cessation.   Follow-up 1 month      Health Maintenance   Topic Date Due    COVID-19 Vaccine (1) Never done    HPV vaccine (2 - Male 3-dose series) 03/24/2016    Flu vaccine (1) 08/01/2022    Depression Monitoring  03/04/2023    DTaP/Tdap/Td vaccine (8 - Td or Tdap) 08/17/2029    Hib vaccine  Completed    Varicella vaccine  Completed    Meningococcal (ACWY) vaccine  Completed    Hepatitis C screen  Completed    HIV screen  Completed    Hepatitis A vaccine  Aged Out    Pneumococcal 0-64 years Vaccine  Aged Out       RTC 1 month and as needed

## 2022-10-27 NOTE — PATIENT INSTRUCTIONS
4 -7- 8 Breath Relaxation Exercise To manage Stress/Anxiety    Anyone can do it. .. Simple    Quick    No equipment needed    Do it anywhere    Are the numbers important? The absolute time you spend on each phase is not important; the ratio of 4:7:8 is important. If you have trouble  holding your breath, speed the exercise up but keep to  the ratio of 4:7:8 for the three phases. With practice you  can slow it all down and get used to inhaling and exhaling more and more deeply. Why should I do it? This exercise is a natural tranquilizer for the nervous system. Unlike tranquilizing drugs, which are often effective  when you first take them but then lose their power over  time, this exercise is subtle when you first try it but gains  in power with repetition and practice. Use this new skill  whenever anything upsetting happens - before you react. Use it whenever you are aware of internal tension. Use it  to help you fall asleep. How often? Do it at least twice a day. You cannot do it too frequently. Do not do more than four breaths at one time for the first  month of practice. Later, if you wish, you can extend it to  eight breaths. If you feel a little lightheaded when you  first breathe this way, do not be concerned - it will pass. STEPS  Exhale completely through your mouth, making a whoosh sound. Close your mouth and inhale quietly through your nose to a mental count of 4. Hold your breath for a count of 7. Exhale completely through your mouth, making a whoosh sound to a count of 8. This is one breath. Now inhale again and repeat the cycle three more times for a total of four breaths. Beginner Tips:  Ideally, sit with your back straight. Place the tip of your tongue against the ridge of tissue just  behind your upper front teeth, and keep it there through the  entire exercise.   Exhale through your mouth around your tongue; try pursing  your lips slightly if this seems awkward    WELL ADULT LIFESTYLE INSTRUCTIONS:    Pick a day in the next week to spend an hour reviewing the information below then:     1) determine your health goals for the year   2) determine what changes you need to achieve those goals   3) design your daily routine, shopping habits etc to implement those changes        Default Right Action (no choices)       Make it EASY to do the RIGHT THINGS. 4) I invite you to send me your plans via [employer] so I can continue to help you       with them    Examine your lifestyle with an emphasis on BARRIERS to bad and good habits and how you can design your life to make better choices. If you want to feel better these are the FUNDAMENTAL PILLARS of Wellness:    1)  You can choose to Get 150 min/week of moderate exercise (can talk but can't        sing) or 75 min/week of vigorous exercise (can't talk). This will enhance your sense of well being (Exercise is as good as medicine for   depression.)    2)  You can choose to Get 7-9 hours of sleep per night    Detoxifies your brain, reduces risk of dementia    3)  You can choose to Strength Train 2 x a week on non-consecutive days   This will improve function and reduce risk of injury. Body weight type exercises   such as Yoga and Pilates are excellent choices. 4)  You can choose Good Nutrition. Only eat your goal weight (in lbs) x 10        calories/day and get 5 servings of Vegetables/day   Plant based diets reduce risk of heart attack/stroke and will help you feel full on   less food. Avoid highly processed foods and processed carbohydrates. 5)  You can choose Moderate alcohol intake < 1-2 drinks/day   Alcohol will disrupt your sleep and add calories to your day. 6)  You can choose to Develop a Charismatic/Supportive relationship. This will strengthen your resilience for the ups and downs. 7)  You can choose to Practice Mindfulness. An hour a day of prayer/meditation/gratitude will change your life!     If you are trying to lose weight, here are some recommendations for weight loss:  Not every weight loss program is appropriate for everybody. ..  good online sources include No[employer] (more social with daily check ins), [employer] (similar but less social) and Naturally slim, as well as Brandne[employer] ($1500)    The GI Diet or \"Primal diet\", Intermittent fasting can also be effective choices. If you have diabetes treated with insulin be sure to ask for specific guidance around meals. Take your desired weight in pounds and multiply by 10 and that is your average daily calorie allowance. For example if you wish to weigh 170 lb x 10 = 1700 pepito/day (this is how to gradually lose the weight and maintain your desired weight). Avoid soda/coke and all \"wet carbs\" => Drink ice water instead    Drink a large glass of ice water before meals and EAT SLOWLY (talk while you eat)! Rethink your hunger => it means your losing weight.     Minimize highly processed carbohydrates as they stimulate your appetite:  Specifically cut back on Bread, Rice, Pasta and Potatoes    Avoid eating calories after 6 pm

## 2022-11-28 ENCOUNTER — OFFICE VISIT (OUTPATIENT)
Dept: PRIMARY CARE CLINIC | Age: 22
End: 2022-11-28
Payer: COMMERCIAL

## 2022-11-28 ENCOUNTER — HOSPITAL ENCOUNTER (OUTPATIENT)
Dept: GENERAL RADIOLOGY | Age: 22
Discharge: HOME | End: 2022-11-28
Payer: COMMERCIAL

## 2022-11-28 VITALS
HEART RATE: 87 BPM | DIASTOLIC BLOOD PRESSURE: 70 MMHG | HEIGHT: 72 IN | TEMPERATURE: 97.2 F | OXYGEN SATURATION: 99 % | BODY MASS INDEX: 28.58 KG/M2 | WEIGHT: 211 LBS | SYSTOLIC BLOOD PRESSURE: 112 MMHG

## 2022-11-28 DIAGNOSIS — R06.02 SHORTNESS OF BREATH: ICD-10-CM

## 2022-11-28 DIAGNOSIS — Z23 NEED FOR VACCINATION: ICD-10-CM

## 2022-11-28 DIAGNOSIS — F32.1 CURRENT MODERATE EPISODE OF MAJOR DEPRESSIVE DISORDER WITHOUT PRIOR EPISODE (HCC): Primary | ICD-10-CM

## 2022-11-28 DIAGNOSIS — R07.9 CHEST PAIN, UNSPECIFIED TYPE: ICD-10-CM

## 2022-11-28 DIAGNOSIS — F41.9 ANXIETY: ICD-10-CM

## 2022-11-28 PROCEDURE — 71046 X-RAY EXAM CHEST 2 VIEWS: CPT

## 2022-11-28 PROCEDURE — 96127 BRIEF EMOTIONAL/BEHAV ASSMT: CPT | Performed by: FAMILY MEDICINE

## 2022-11-28 PROCEDURE — 99214 OFFICE O/P EST MOD 30 MIN: CPT | Performed by: FAMILY MEDICINE

## 2022-11-28 ASSESSMENT — PATIENT HEALTH QUESTIONNAIRE - PHQ9
5. POOR APPETITE OR OVEREATING: 3
6. FEELING BAD ABOUT YOURSELF - OR THAT YOU ARE A FAILURE OR HAVE LET YOURSELF OR YOUR FAMILY DOWN: 0
7. TROUBLE CONCENTRATING ON THINGS, SUCH AS READING THE NEWSPAPER OR WATCHING TELEVISION: 2
SUM OF ALL RESPONSES TO PHQ QUESTIONS 1-9: 11
SUM OF ALL RESPONSES TO PHQ QUESTIONS 1-9: 11
4. FEELING TIRED OR HAVING LITTLE ENERGY: 3
9. THOUGHTS THAT YOU WOULD BE BETTER OFF DEAD, OR OF HURTING YOURSELF: 0
2. FEELING DOWN, DEPRESSED OR HOPELESS: 0
SUM OF ALL RESPONSES TO PHQ QUESTIONS 1-9: 11
SUM OF ALL RESPONSES TO PHQ QUESTIONS 1-9: 11
8. MOVING OR SPEAKING SO SLOWLY THAT OTHER PEOPLE COULD HAVE NOTICED. OR THE OPPOSITE, BEING SO FIGETY OR RESTLESS THAT YOU HAVE BEEN MOVING AROUND A LOT MORE THAN USUAL: 0
1. LITTLE INTEREST OR PLEASURE IN DOING THINGS: 0
10. IF YOU CHECKED OFF ANY PROBLEMS, HOW DIFFICULT HAVE THESE PROBLEMS MADE IT FOR YOU TO DO YOUR WORK, TAKE CARE OF THINGS AT HOME, OR GET ALONG WITH OTHER PEOPLE: NOT DIFFICULT AT ALL
3. TROUBLE FALLING OR STAYING ASLEEP: 3
SUM OF ALL RESPONSES TO PHQ9 QUESTIONS 1 & 2: 0

## 2022-11-28 ASSESSMENT — PATIENT HEALTH QUESTIONNAIRE - GENERAL
HAS THERE BEEN A TIME IN THE PAST MONTH WHEN YOU HAVE HAD SERIOUS THOUGHTS ABOUT ENDING YOUR LIFE?: NO
IN THE PAST YEAR HAVE YOU FELT DEPRESSED OR SAD MOST DAYS, EVEN IF YOU FELT OKAY SOMETIMES?: NO
HAVE YOU EVER, IN YOUR WHOLE LIFE, TRIED TO KILL YOURSELF OR MADE A SUICIDE ATTEMPT?: NO

## 2022-11-28 ASSESSMENT — ANXIETY QUESTIONNAIRES
2. NOT BEING ABLE TO STOP OR CONTROL WORRYING: 2
5. BEING SO RESTLESS THAT IT IS HARD TO SIT STILL: 2
6. BECOMING EASILY ANNOYED OR IRRITABLE: 2
3. WORRYING TOO MUCH ABOUT DIFFERENT THINGS: 2
IF YOU CHECKED OFF ANY PROBLEMS ON THIS QUESTIONNAIRE, HOW DIFFICULT HAVE THESE PROBLEMS MADE IT FOR YOU TO DO YOUR WORK, TAKE CARE OF THINGS AT HOME, OR GET ALONG WITH OTHER PEOPLE: NOT DIFFICULT AT ALL
GAD7 TOTAL SCORE: 14
1. FEELING NERVOUS, ANXIOUS, OR ON EDGE: 2
4. TROUBLE RELAXING: 2
7. FEELING AFRAID AS IF SOMETHING AWFUL MIGHT HAPPEN: 2

## 2022-11-28 NOTE — PROGRESS NOTES
Chief Complaint   Patient presents with    Depression       HPI: Sky  presents for evaluation and management of depression and new complaint of shortness of breath    Sky notes he did not like the way the Prozac made him feel. He also did not like Zoloft. States he does not tolerate any SSRIs. He does prefer Klonopin. He continues with anxiety as well. He also notes he has some shortness of breath and difficulty breathing intermittently. He is concerned about the status of his lungs notes some cough as well. Today's PHQ:    PHQ Scores 11/28/2022 10/27/2022 3/4/2022 2/11/2021   PHQ2 Score 0 2 0 0   PHQ9 Score 11 16 0 0     Interpretation of Total Score Depression Severity: 1-4 = Minimal depression, 5-9 = Mild depression, 10-14 = Moderate depression, 15-19 = Moderately severe depression, 20-27 = Severe depression    ERIKA 7 SCORE 11/28/2022 10/27/2022   ERIKA-7 Total Score 14 16     Interpretation of ERIKA-7 score: 5-9 = mild anxiety, 10-14 = moderate anxiety, 15+ = severe anxiety. Recommend referral to behavioral health for scores 10 or greater. Review of Systems    No Known Allergies  New Prescriptions    CARIPRAZINE HCL (VRAYLAR) 1.5 MG CAPSULE    Take 1 capsule by mouth daily     Current Outpatient Medications   Medication Sig Dispense Refill    cariprazine hcl (VRAYLAR) 1.5 MG capsule Take 1 capsule by mouth daily 30 capsule 1    clonazePAM (KLONOPIN) 2 MG tablet Take 1 tablet by mouth 2 times daily as needed (1/2-1 tabs QHS PRN) for up to 180 days. 60 tablet 5     No current facility-administered medications for this visit.        Past Medical History:   Diagnosis Date    Anxiety 2/11/2021    Attention deficit hyperactivity disorder, combined type 6/9/2010    Atypical tic disorder     2/2 adderall:  seen at [facility] Neurologic Functional Disorder    Hypnic jerks 8/31/2020    REM sleep behavior disorder          Objective   /70   Pulse 87   Temp 97.2 °F (36.2 °C)   Ht 6' (1.829 m)   Wt 211 lb (95.7 kg)   SpO2 99%   BMI 28.62 kg/m²   Wt Readings from Last 3 Encounters:   11/28/22 211 lb (95.7 kg)   10/27/22 208 lb (94.3 kg)   09/05/22 196 lb (88.9 kg)       Physical Exam  Constitutional:       Appearance: He is well-developed. Cardiovascular:      Rate and Rhythm: Normal rate and regular rhythm. Heart sounds: No murmur heard. No friction rub. No gallop. Pulmonary:      Effort: Pulmonary effort is normal.      Breath sounds: Normal breath sounds. No wheezing or rales. Abdominal:      General: Bowel sounds are normal. There is no distension. Palpations: Abdomen is soft. There is no mass. Tenderness: There is no abdominal tenderness. Skin:     General: Skin is warm and dry. Findings: No rash. Psychiatric:         Mood and Affect: Mood normal.         Behavior: Behavior normal.         Thought Content: Thought content normal.         Chemistry        Component Value Date/Time     12/02/2021 0922    K 4.0 12/02/2021 0922     12/02/2021 0922    CO2 27 12/02/2021 0922    BUN 16 12/02/2021 0922    CREATININE 1.0 12/02/2021 0922        Component Value Date/Time    CALCIUM 8.8 12/02/2021 0922    ALKPHOS 51 12/02/2021 0922    AST 31 12/02/2021 0922    ALT 26 12/02/2021 0922    BILITOT 0.3 12/02/2021 6599          Lab Results   Component Value Date    WBC 3.4 (L) 12/02/2021    HGB 15.1 12/02/2021    HCT 45.7 12/02/2021    MCV 87.9 12/02/2021     12/02/2021     No results found for: LABA1C  No results found for: EAG  No results found for: LABA1C  No components found for: CHLPL  No results found for: TRIG  No results found for: HDL  No results found for: LDLCALC  No results found for: LABVLDL      Assessment   Plan   1. Current moderate episode of major depressive disorder without prior episode (HCC)  No significant change. We will trial patient on Vraylar and he will keep his follow-up with Dr. Trever Nunez later this week.   We will see him back in 6 weeks  - MO BEHAV ASSMT W/SCORE & DOCD/STAND INSTRUMENT  - cariprazine hcl (VRAYLAR) 1.5 MG capsule; Take 1 capsule by mouth daily  Dispense: 30 capsule; Refill: 1    2. Anxiety  As above  - MO BEHAV ASSMT W/SCORE & DOCD/STAND INSTRUMENT    3. Chest pain, unspecified type  We will check chest x-ray as below    4. Need for vaccination  Patient initially agreed to HPV vaccination and then declined  -     5. Shortness of breath  We will check pulmonary function test and chest x-ray  - Full PFT Study With Bronchodilator; Future  - XR CHEST (2 VW); Future      RTC Return in about 6 weeks (around 1/9/2023).

## 2022-11-30 ENCOUNTER — HOSPITAL ENCOUNTER (OUTPATIENT)
Dept: PULMONOLOGY | Age: 22
Discharge: HOME | End: 2022-11-30
Payer: COMMERCIAL

## 2022-11-30 DIAGNOSIS — R06.02 SHORTNESS OF BREATH: ICD-10-CM

## 2022-11-30 LAB
DLCO %PRED: 82 %
DLCO PRED: NORMAL
DLCO/VA %PRED: NORMAL
DLCO/VA PRED: NORMAL
DLCO/VA: NORMAL
DLCO: NORMAL
EXPIRATORY TIME-POST: NORMAL
EXPIRATORY TIME: NORMAL
FEF 25-75% %CHNG: NORMAL
FEF 25-75% %PRED-POST: NORMAL
FEF 25-75% %PRED-PRE: NORMAL
FEF 25-75% PRED: NORMAL
FEF 25-75%-POST: NORMAL
FEF 25-75%-PRE: NORMAL
FEV1 %PRED-POST: 85 %
FEV1 %PRED-PRE: 82 %
FEV1 PRED: NORMAL
FEV1-POST: NORMAL
FEV1-PRE: NORMAL
FEV1/FVC %PRED-POST: NORMAL
FEV1/FVC %PRED-PRE: NORMAL
FEV1/FVC PRED: NORMAL
FEV1/FVC-POST: NORMAL
FEV1/FVC-PRE: NORMAL
FVC %PRED-POST: 79 L
FVC %PRED-PRE: 74 %
FVC PRED: NORMAL
FVC-POST: NORMAL
FVC-PRE: NORMAL
GAW %PRED: NORMAL
GAW PRED: NORMAL
GAW: NORMAL
IC %PRED: NORMAL
IC PRED: NORMAL
IC: NORMAL
MEP: NORMAL
MIP: NORMAL
MVV %PRED-PRE: NORMAL
MVV PRED: NORMAL
MVV-PRE: NORMAL
PEF %PRED-POST: NORMAL
PEF %PRED-PRE: NORMAL
PEF PRED: NORMAL
PEF%CHNG: NORMAL
PEF-POST: NORMAL
PEF-PRE: NORMAL
RAW %PRED: NORMAL
RAW PRED: NORMAL
RAW: NORMAL
RV %PRED: NORMAL
RV PRED: NORMAL
RV: NORMAL
SVC %PRED: NORMAL
SVC PRED: NORMAL
SVC: NORMAL
TLC %PRED: 84 %
TLC PRED: NORMAL
TLC: NORMAL
VA %PRED: NORMAL
VA PRED: NORMAL
VA: NORMAL
VTG %PRED: NORMAL
VTG PRED: NORMAL
VTG: NORMAL

## 2022-11-30 PROCEDURE — 94729 DIFFUSING CAPACITY: CPT | Performed by: INTERNAL MEDICINE

## 2022-11-30 PROCEDURE — 94726 PLETHYSMOGRAPHY LUNG VOLUMES: CPT

## 2022-11-30 PROCEDURE — 94060 EVALUATION OF WHEEZING: CPT

## 2022-11-30 PROCEDURE — 94060 EVALUATION OF WHEEZING: CPT | Performed by: INTERNAL MEDICINE

## 2022-11-30 PROCEDURE — 94726 PLETHYSMOGRAPHY LUNG VOLUMES: CPT | Performed by: INTERNAL MEDICINE

## 2022-11-30 PROCEDURE — 94640 AIRWAY INHALATION TREATMENT: CPT

## 2022-11-30 PROCEDURE — 94729 DIFFUSING CAPACITY: CPT

## 2022-11-30 PROCEDURE — 6370000000 HC RX 637 (ALT 250 FOR IP): Performed by: FAMILY MEDICINE

## 2022-11-30 RX ORDER — ALBUTEROL SULFATE 90 UG/1
4 AEROSOL, METERED RESPIRATORY (INHALATION) ONCE
Status: COMPLETED | OUTPATIENT
Start: 2022-11-30 | End: 2022-11-30

## 2022-11-30 RX ADMIN — ALBUTEROL SULFATE 4 PUFF: 90 AEROSOL, METERED RESPIRATORY (INHALATION) at 09:53

## 2022-11-30 ASSESSMENT — PULMONARY FUNCTION TESTS
FVC_PERCENT_PREDICTED_PRE: 74
FVC_PERCENT_PREDICTED_POST: 79
FEV1_PERCENT_PREDICTED_POST: 85
FEV1_PERCENT_PREDICTED_PRE: 82

## 2022-11-30 NOTE — PROCEDURES
spirometry was acceptable and reproducible by ATS standards      Spirometry/Flow volume loop:  Active airflow obstruction or statistically significant postbronchodilator response. Lung volumes:  Within normal    Diffusing capacity:  Normal diffusion capacity    Impression:  Normal PFT    OBSTRUCTION % Predicted FEV1   MILD >70%   MODERATE 60-69%   MODERATELY-SEVERE 50-59%   SEVERE 35-49%   VERY SEVERE <35%         RESTRICTION % Predicted TLC   MILD 66-80%   MODERATE 54-65%   MODERATELY-SEVERE <54%                 DIFFUSION CAPACITY DLCO % Pred   MILD >60% AND < LLN   MODERATE 40-60%   SEVERE <40%       PFT data will be scanned into the media tab under this encounter. Please see the scanned data for numerical values.      Geovanni Casey MD  [facility] Pulmonary, Sleep and Critical Care Medicine

## 2022-12-01 ENCOUNTER — OFFICE VISIT (OUTPATIENT)
Dept: PSYCHOLOGY | Age: 22
End: 2022-12-01

## 2022-12-01 DIAGNOSIS — F41.1 GENERALIZED ANXIETY DISORDER: Primary | ICD-10-CM

## 2022-12-01 DIAGNOSIS — F32.A DEPRESSION, UNSPECIFIED DEPRESSION TYPE: ICD-10-CM

## 2022-12-01 ASSESSMENT — PATIENT HEALTH QUESTIONNAIRE - PHQ9
SUM OF ALL RESPONSES TO PHQ QUESTIONS 1-9: 12
5. POOR APPETITE OR OVEREATING: 3
1. LITTLE INTEREST OR PLEASURE IN DOING THINGS: 1
4. FEELING TIRED OR HAVING LITTLE ENERGY: 2
3. TROUBLE FALLING OR STAYING ASLEEP: 3
10. IF YOU CHECKED OFF ANY PROBLEMS, HOW DIFFICULT HAVE THESE PROBLEMS MADE IT FOR YOU TO DO YOUR WORK, TAKE CARE OF THINGS AT HOME, OR GET ALONG WITH OTHER PEOPLE: 1
7. TROUBLE CONCENTRATING ON THINGS, SUCH AS READING THE NEWSPAPER OR WATCHING TELEVISION: 1
SUM OF ALL RESPONSES TO PHQ9 QUESTIONS 1 & 2: 2
SUM OF ALL RESPONSES TO PHQ QUESTIONS 1-9: 12
8. MOVING OR SPEAKING SO SLOWLY THAT OTHER PEOPLE COULD HAVE NOTICED. OR THE OPPOSITE, BEING SO FIGETY OR RESTLESS THAT YOU HAVE BEEN MOVING AROUND A LOT MORE THAN USUAL: 0
9. THOUGHTS THAT YOU WOULD BE BETTER OFF DEAD, OR OF HURTING YOURSELF: 0
2. FEELING DOWN, DEPRESSED OR HOPELESS: 1
SUM OF ALL RESPONSES TO PHQ QUESTIONS 1-9: 12
6. FEELING BAD ABOUT YOURSELF - OR THAT YOU ARE A FAILURE OR HAVE LET YOURSELF OR YOUR FAMILY DOWN: 1
SUM OF ALL RESPONSES TO PHQ QUESTIONS 1-9: 12

## 2022-12-01 ASSESSMENT — ANXIETY QUESTIONNAIRES
4. TROUBLE RELAXING: 1-SEVERAL DAYS
5. BEING SO RESTLESS THAT IT IS HARD TO SIT STILL: 2-OVER HALF THE DAYS
2. NOT BEING ABLE TO STOP OR CONTROL WORRYING: 1-SEVERAL DAYS
3. WORRYING TOO MUCH ABOUT DIFFERENT THINGS: 2-OVER HALF THE DAYS
7. FEELING AFRAID AS IF SOMETHING AWFUL MIGHT HAPPEN: 1-SEVERAL DAYS
1. FEELING NERVOUS, ANXIOUS, OR ON EDGE: 2
GAD7 TOTAL SCORE: 10
6. BECOMING EASILY ANNOYED OR IRRITABLE: 1-SEVERAL DAYS

## 2022-12-01 NOTE — RESULT ENCOUNTER NOTE
Good Morning Indra Agudelo ,    Thank you for getting your pulmonary function test done  Your lab results are back and are normal.    Keep up the good work!     Have a great weekend,    Dr. Kendra Canales      For your convenience I have listed your future appointment(s) below:  Future Appointments  12/1/2022  3:30 PM    THERESA Heller  1/9/2023   4:00 PM    MD CIERRA Encinas - [place]  1/16/2023  10:40 AM   Dennis Harry MD         [facility]

## 2022-12-01 NOTE — PATIENT INSTRUCTIONS
Call for cognitive behavioral therapy (CBT) for Generalized anxiety disorder (ERIKA):     [email or web address]    [email or web address]  [phone]  Stevegwendolyn Trevor, [place]    2. Dr Justino Bucio will contact other colleagues for other therapist referrals, my chart to you over the next week  3. Start Leigh Hedger as prescribed per Dr Rob Babcock   4.  No further follow up with Dr Justino Bucio required, return as needed

## 2022-12-01 NOTE — PROGRESS NOTES
Behavioral Health Consultation  Waqas Palm PsyD  Psychologist  12/1/2022  3:53 PM      Time spent with Patient: 45 minutes  This is patient's first  [facility] [place]. Reason for Consult:  ERIKA, depression unspecified type  Referring Provider: Denton Hooper MD  [address] [place] [address] [address],  [place]    Pt provided informed consent for the behavioral health program. Discussed with patient model of service to include the limits of confidentiality (i.e. abuse reporting, suicide intervention, etc.) and short-term intervention focused approach. Pt indicated understanding. Feedback given to PCP. S:    Presenting Problem (PP): anxiety     Problem hx: Per Dr Mandi Guerrero note on 11/28: \"Jr notes he did not like the way the Prozac made him feel. He also did not like Zoloft. States he does not tolerate any SSRIs. He does prefer Klonopin. He continues with anxiety as well. He also notes he has some shortness of breath and difficulty breathing intermittently. He is concerned about the status of his lungs notes some cough as well. \"    UPDATED Problem hx: Reported having history of anxiety for quite some time. Trials centered around medication only, didn't like how fluoxetine made him feel. Prescribe vraylar by PCP, hasn't started it yet but is going to once he picks it up. ERIKA: tends to have trouble sitting still, pacing issues, people pleasing trouble saying \"no\", and perfectionism. Shared he feels he often ended feeling not good enough after failed dating relationship, lots of worry making sure partner was happy. Trauma hx: none    Past psych tx: no psychotherapy, most recent 2 day trial sertraline    Current psych med tx: going to try vraylar per PCP recommendation    Functional assessment/Typical day (how PP is affecting or being affected by. Britton Weaver ):  Close relationships:  Family supportive     [employer] [facility] [employer].  graduate.      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 Positive screen. Pacing, people pleasing, and perfectionistic patterns. Psycho-ed how CBT for ERIKA supports changing these emotional patterns and in turn managing anxiety. OCD:  O: health anxiety     Panic:  none reported     Psychosis: denies A/VH, or delusions    Substance abuse: none     PTSD:      PC-PTSD-5   Sometimes things happen to people that are unusually or especially frightening, horrible, or traumatic. For example:   a serious accident or fire   a physical or sexual assault or abuse   an earthquake or flood   a war   seeing someone be killed or seriously injured   having a loved one die through homicide or suicide. Have you ever experienced this kind of event? NO   If no, screen total = 0. Please stop here.      NEGATIVE PTSD SCREEN     O:  MSE:    Appearance    alert, cooperative  Appetite complaint of weight gain since college  Sleep disturbance Yes  Fatigue Yes  Loss of pleasure Yes  Impulsive behavior No  Speech    normal rate, normal volume, and well articulated  Mood    Anxious  Depressed  Low self-esteem  Affect    normal affect  Thought Content    intact  Thought Process    linear, goal directed, and coherent  Associations    logical connections  Insight    Good  Judgment    Intact  Orientation    oriented to person, place, time, and general circumstances  Memory    recent and remote memory intact  Attention/Concentration intact  Morbid ideation No  Suicide Assessment    no suicidal ideation      History:    Medications:   Current Outpatient Medications   Medication Sig Dispense Refill    cariprazine hcl (VRAYLAR) 1.5 MG capsule Take 1 capsule by mouth daily 30 capsule 1    clonazePAM (KLONOPIN) 2 MG tablet Take 1 tablet by mouth 2 times daily as needed (1/2-1 tabs QHS PRN) for up to 180 days. 60 tablet 5     No current facility-administered medications for this visit. 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   Tobacco Use    Smoking status: Never    Smokeless tobacco: Never   Vaping Use    Vaping Use: Every day    Substances: Nicotine   Substance and Sexual Activity    Alcohol use:  Yes     Alcohol/week: 6.0 - 20.0 standard drinks     Types: 6 - 20 Standard drinks or equivalent per week     Comment: Socially    Drug use: Not Currently     Types: Marijuana Antoinette Ashby)    Sexual activity: Not Currently     Partners: Female   Other Topics Concern    Not on file   Social History Narrative    Not on file     Social Determinants of Health     Financial Resource Strain: Low Risk     Difficulty of Paying Living Expenses: Not hard at all   Food Insecurity: No Food Insecurity    Worried About Running Out of Food in the Last Year: Never true    Ran Out of Food in the Last Year: Never true   Transportation Needs: Not on file   Physical Activity: Sufficiently Active    Days of Exercise per Week: 4 days    Minutes of Exercise per Session: 60 min   Stress: Not on file   Social Connections: Not on file   Intimate Partner Violence: Not At Risk    Fear of Current or Ex-Partner: No    Emotionally Abused: No    Physically Abused: No    Sexually Abused: No   Housing Stability: Not on file       TOBACCO:   reports that he has never smoked. He has never used smokeless tobacco.  ETOH:   reports current alcohol use of about 6.0 - 20.0 standard drinks per week. Family History:   Family History   Problem Relation Age of Onset    Diabetes Father     High Blood Pressure Father     Sleep Apnea Father     Alcohol Abuse Paternal Grandmother      A:    See S: above     PHQ Scores 12/1/2022 11/28/2022 10/27/2022 3/4/2022 2/11/2021   PHQ2 Score 2 0 2 0 0   PHQ9 Score 12 11 16 0 0     Interpretation of Total Score Depression Severity: 1-4 = Minimal depression, 5-9 = Mild depression, 10-14 = Moderate depression, 15-19 = Moderately severe depression, 20-27 = Severe depression     ERIKA 7 SCORE 12/1/2022 11/28/2022 10/27/2022   ERIKA-7 Total Score - 14 16   ERIKA-7 Total Score 10 - -     Interpretation of ERIKA-7 score: 5-9 = mild anxiety, 10-14 = moderate anxiety, 15+ = severe anxiety. Recommend referral to behavioral health for scores 10 or greater. Safety: Denied any si/hi risk, intent, or plan. No hx psych admissions. No hx suicide attempts/gestures. No hx substance abuse tx.      Diagnosis:    ERIKA, depression unspecified depression type       Diagnosis Date    Anxiety 2/11/2021    Attention deficit hyperactivity disorder, combined type 6/9/2010    Atypical tic disorder     2/2 adderall:  seen at [facility] Neurologic Functional Disorder    Valencia martinez 8/31/2020    REM sleep behavior disorder      Problems with primary support group and Problems related to the social environment    Plan:  Pt interventions:    Practiced assertive communication, Trained in strategies for increasing balanced thinking, Discussed self-care (sleep, nutrition, rewarding activities, social support, exercise), Discussed benefits of referral for specialty care, Motivational Interviewing to determine importance and readiness for change, Discussed potential barriers to change, Established rapport, Conducted functional assessment, and Supportive techniques    Pt Behavioral Change Plan:    Call for cognitive behavioral therapy (CBT) for Generalized anxiety disorder (ERIKA):     [email or web address]    [email or web address]  [phone]  Stevegwendolyn Trevor, [place]    2. Dr Sobia Mcnamara will contact other colleagues for other therapist referrals, my chart to you over the next week  3. Start Arleta Spells as prescribed per Dr Laz Flood   4.  No further follow up with Dr Sobia Mcnamara required, return as needed

## 2022-12-12 ENCOUNTER — PATIENT MESSAGE (OUTPATIENT)
Dept: PRIMARY CARE CLINIC | Age: 22
End: 2022-12-12

## 2022-12-12 DIAGNOSIS — E66.3 OVERWEIGHT: Primary | ICD-10-CM

## 2022-12-12 NOTE — TELEPHONE ENCOUNTER
From: Mason  To: Dr. Asif Guest: 12/12/2022 2:35 PM EST  Subject: Referral    Henatasha Lowry Hem -     I was wondering if you were also able to refer me to a nearby Nutritionist or Dietician? I want to try and get a consult before going full throttle on my weight loss venture. Thanks!

## 2023-01-10 ENCOUNTER — OFFICE VISIT (OUTPATIENT)
Dept: SLEEP MEDICINE | Age: 23
End: 2023-01-10
Payer: COMMERCIAL

## 2023-01-10 VITALS
BODY MASS INDEX: 29.15 KG/M2 | DIASTOLIC BLOOD PRESSURE: 60 MMHG | HEART RATE: 94 BPM | HEIGHT: 72 IN | SYSTOLIC BLOOD PRESSURE: 100 MMHG | RESPIRATION RATE: 20 BRPM | OXYGEN SATURATION: 99 % | TEMPERATURE: 97.6 F | WEIGHT: 215.2 LBS

## 2023-01-10 DIAGNOSIS — G25.81 RLS (RESTLESS LEGS SYNDROME): ICD-10-CM

## 2023-01-10 DIAGNOSIS — F51.8 HYPNIC JERKS: ICD-10-CM

## 2023-01-10 DIAGNOSIS — G47.52 REM SLEEP BEHAVIOR DISORDER: Primary | ICD-10-CM

## 2023-01-10 DIAGNOSIS — F51.04 CHRONIC INSOMNIA: ICD-10-CM

## 2023-01-10 PROCEDURE — 99214 OFFICE O/P EST MOD 30 MIN: CPT | Performed by: PSYCHIATRY & NEUROLOGY

## 2023-01-10 RX ORDER — CLONAZEPAM 2 MG/1
TABLET ORAL
Qty: 60 TABLET | Refills: 5 | Status: SHIPPED | OUTPATIENT
Start: 2023-01-10 | End: 2026-01-15

## 2023-01-10 RX ORDER — SUVOREXANT 10 MG/1
TABLET, FILM COATED ORAL
Qty: 30 TABLET | Refills: 5 | Status: SHIPPED | OUTPATIENT
Start: 2023-01-10 | End: 2025-01-09

## 2023-01-10 ASSESSMENT — SLEEP AND FATIGUE QUESTIONNAIRES
HOW LIKELY ARE YOU TO NOD OFF OR FALL ASLEEP IN A CAR, WHILE STOPPED FOR A FEW MINUTES IN TRAFFIC: 0
ESS TOTAL SCORE: 3
HOW LIKELY ARE YOU TO NOD OFF OR FALL ASLEEP WHILE WATCHING TV: 1
HOW LIKELY ARE YOU TO NOD OFF OR FALL ASLEEP WHEN YOU ARE A PASSENGER IN A CAR FOR AN HOUR WITHOUT A BREAK: 1
HOW LIKELY ARE YOU TO NOD OFF OR FALL ASLEEP WHILE SITTING AND READING: 0
HOW LIKELY ARE YOU TO NOD OFF OR FALL ASLEEP WHILE SITTING AND TALKING TO SOMEONE: 0
HOW LIKELY ARE YOU TO NOD OFF OR FALL ASLEEP WHILE SITTING INACTIVE IN A PUBLIC PLACE: 0
HOW LIKELY ARE YOU TO NOD OFF OR FALL ASLEEP WHILE SITTING QUIETLY AFTER LUNCH WITHOUT ALCOHOL: 0
HOW LIKELY ARE YOU TO NOD OFF OR FALL ASLEEP WHILE LYING DOWN TO REST IN THE AFTERNOON WHEN CIRCUMSTANCES PERMIT: 1

## 2023-01-10 NOTE — PROGRESS NOTES
MD RYDER Roass Board Certified in Sleep Medicine  Certified in [address] [place] [address] Certified in Neurology Shaun Bri Lowe Yola [address] [place] [address]. [address]  [phone]   [address] [place] [address] Ave [address]                      [address]  [address] [address] [phone]    Subjective:     Patient ID: Steven Shelton is a 23 y.o. male. Chief Complaint   Patient presents with    Follow-up         HPI:        Steven Shelton is a 23 y.o. male was seen today as a follow for REM sleep behavior disorder and RLS. No much hypnic jerk or moving during the REM with Klonopin 4 mg.  most of the night 4  mg. Also on Melatonin 10 mg. The Patient scored [place] Sleepiness Score: 3 on [place] Sleepiness Scale ( more than 10 is indicative of daytime sleepiness)   Trying to go sleep and hears different staffs and the brain does not shut down. Has insomnia and takes him over 45 minutes to fall in sleep. DOT/CDL - N/A        Previous Report(s)Reviewed: historical medical records         Social History     Socioeconomic History    Marital status: Single     Spouse name: Not on file    Number of children: Not on file    Years of education: Not on file    Highest education level: Not on file   Occupational History    Occupation: [employer]   Tobacco Use    Smoking status: Some Days     Packs/day: 0.25     Years: 2.00     Pack years: 0.50     Types: Cigarettes     Passive exposure: Past    Smokeless tobacco: Never   Vaping Use    Vaping Use: Every day    Substances: Nicotine   Substance and Sexual Activity    Alcohol use:  Yes     Alcohol/week: 6.0 - 20.0 standard drinks     Types: 6 - 20 Standard drinks or equivalent per week     Comment: Socially    Drug use: Not Currently     Types: Marijuana Mamta Terry    Sexual activity: Not Currently     Partners: Female   Other Topics Concern    Not on file   Social History Narrative    Not on file     Social Determinants of Health     Financial Resource Strain: Low Risk     Difficulty of Paying Living Expenses: Not hard at all   Food Insecurity: No Food Insecurity    Worried About Running Out of Food in the Last Year: Never true    Ran Out of Food in the Last Year: Never true   Transportation Needs: Not on file   Physical Activity: Sufficiently Active    Days of Exercise per Week: 4 days    Minutes of Exercise per Session: 60 min   Stress: Not on file   Social Connections: Not on file   Intimate Partner Violence: Not At Risk    Fear of Current or Ex-Partner: No    Emotionally Abused: No    Physically Abused: No    Sexually Abused: No   Housing Stability: Not on file       Prior to Admission medications    Medication Sig Start Date End Date Taking?  Authorizing Provider   clonazePAM (KLONOPIN) 2 MG tablet 1-2 tabs QHS 1/10/23 1/15/26 Yes Jes Mathew MD   Suvorexant (BELSOMRA) 10 MG TABS One tab QHS 1/10/23 1/9/25 Yes Jes Mathew MD       Allergies as of 01/10/2023    (No Known Allergies)       Patient Active Problem List   Diagnosis    Attention deficit hyperactivity disorder, combined type    Hypnic jerks    REM sleep behavior disorder    Fasciculations - Neuro Dr. 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 - eval'd by Neuro    Shin splint, right, initial encounter       Past Medical History:   Diagnosis Date    Anxiety 2/11/2021    Attention deficit hyperactivity disorder, combined type 6/9/2010    Atypical tic disorder     2/2 adderall:  seen at [facility] Neurologic Functional Disorder    Hypnic jerks 8/31/2020    REM sleep behavior disorder        Past Surgical History:   Procedure Laterality Date    NASAL SEPTUM SURGERY         Family History   Problem Relation Age of Onset    Diabetes Father     High Blood Pressure Father     Sleep Apnea Father     Alcohol Abuse Paternal Grandmother        Review of Systems    Objective:     Vitals:  Weight BMI Neck circumference    Wt Readings from Last 3 Encounters:   01/10/23 215 lb 3.2 oz (97.6 kg)   11/28/22 211 lb (95.7 kg)   10/27/22 208 lb (94.3 kg)    Body mass index is 29.19 kg/m². BP HR SaO2   BP Readings from Last 3 Encounters:   01/10/23 100/60   11/28/22 112/70   10/27/22 121/78    Pulse Readings from Last 3 Encounters:   01/10/23 94   11/28/22 87   10/27/22 89    SpO2 Readings from Last 3 Encounters:   01/10/23 99%   11/28/22 99%   10/27/22 97%        Themandibular molar Class :   [x]1 []2 []3      Mallampati I Airway Classification:   [x]1 []2 []3 []4      Physical Exam    :        Diagnosis Orders   1. REM sleep behavior disorder  clonazePAM (KLONOPIN) 2 MG tablet      2. Hypnic jerks  clonazePAM (KLONOPIN) 2 MG tablet      3. RLS (restless legs syndrome)  clonazePAM (KLONOPIN) 2 MG tablet      4. Chronic insomnia  Suvorexant (BELSOMRA) 10 MG TABS        Plan: Will continue the 4 mg of Klonopin and 10 mg of Melatonin. I will add Belsomra for insomnia. I might try to wean decrease the dose of Klonopin if possible after that. No orders of the defined types were placed in this encounter. Return in about 6 months (around 7/10/2023) for Reveiwing CPAP usage and compliance report and tro.     Anni London MD  Medical Director - [facility]

## 2023-01-31 DIAGNOSIS — F51.04 CHRONIC INSOMNIA: Primary | ICD-10-CM

## 2023-01-31 RX ORDER — DARIDOREXANT 50 MG/1
TABLET, FILM COATED ORAL
Qty: 30 TABLET | Refills: 5 | Status: SHIPPED | OUTPATIENT
Start: 2023-01-31 | End: 2023-02-02

## 2023-02-02 ENCOUNTER — OFFICE VISIT (OUTPATIENT)
Dept: PRIMARY CARE CLINIC | Age: 23
End: 2023-02-02
Payer: COMMERCIAL

## 2023-02-02 VITALS
SYSTOLIC BLOOD PRESSURE: 119 MMHG | DIASTOLIC BLOOD PRESSURE: 75 MMHG | BODY MASS INDEX: 29.38 KG/M2 | WEIGHT: 216.6 LBS | HEART RATE: 98 BPM

## 2023-02-02 DIAGNOSIS — R06.02 SHORTNESS OF BREATH: ICD-10-CM

## 2023-02-02 DIAGNOSIS — F32.1 CURRENT MODERATE EPISODE OF MAJOR DEPRESSIVE DISORDER WITHOUT PRIOR EPISODE (HCC): Primary | ICD-10-CM

## 2023-02-02 DIAGNOSIS — F51.01 PRIMARY INSOMNIA: ICD-10-CM

## 2023-02-02 DIAGNOSIS — F41.9 ANXIETY: ICD-10-CM

## 2023-02-02 PROCEDURE — 96127 BRIEF EMOTIONAL/BEHAV ASSMT: CPT | Performed by: FAMILY MEDICINE

## 2023-02-02 PROCEDURE — 99214 OFFICE O/P EST MOD 30 MIN: CPT | Performed by: FAMILY MEDICINE

## 2023-02-02 RX ORDER — TRAZODONE HYDROCHLORIDE 100 MG/1
100 TABLET ORAL NIGHTLY PRN
Qty: 30 TABLET | Refills: 5 | Status: SHIPPED | OUTPATIENT
Start: 2023-02-02

## 2023-02-02 ASSESSMENT — ANXIETY QUESTIONNAIRES
2. NOT BEING ABLE TO STOP OR CONTROL WORRYING: 0
7. FEELING AFRAID AS IF SOMETHING AWFUL MIGHT HAPPEN: 0
3. WORRYING TOO MUCH ABOUT DIFFERENT THINGS: 0
6. BECOMING EASILY ANNOYED OR IRRITABLE: 1
GAD7 TOTAL SCORE: 3
4. TROUBLE RELAXING: 1
1. FEELING NERVOUS, ANXIOUS, OR ON EDGE: 0
5. BEING SO RESTLESS THAT IT IS HARD TO SIT STILL: 1

## 2023-02-02 ASSESSMENT — PATIENT HEALTH QUESTIONNAIRE - PHQ9
8. MOVING OR SPEAKING SO SLOWLY THAT OTHER PEOPLE COULD HAVE NOTICED. OR THE OPPOSITE, BEING SO FIGETY OR RESTLESS THAT YOU HAVE BEEN MOVING AROUND A LOT MORE THAN USUAL: 0
SUM OF ALL RESPONSES TO PHQ QUESTIONS 1-9: 5
4. FEELING TIRED OR HAVING LITTLE ENERGY: 1
3. TROUBLE FALLING OR STAYING ASLEEP: 3
7. TROUBLE CONCENTRATING ON THINGS, SUCH AS READING THE NEWSPAPER OR WATCHING TELEVISION: 0
SUM OF ALL RESPONSES TO PHQ QUESTIONS 1-9: 5
SUM OF ALL RESPONSES TO PHQ9 QUESTIONS 1 & 2: 0
2. FEELING DOWN, DEPRESSED OR HOPELESS: 0
SUM OF ALL RESPONSES TO PHQ QUESTIONS 1-9: 5
9. THOUGHTS THAT YOU WOULD BE BETTER OFF DEAD, OR OF HURTING YOURSELF: 0
5. POOR APPETITE OR OVEREATING: 1
1. LITTLE INTEREST OR PLEASURE IN DOING THINGS: 0
6. FEELING BAD ABOUT YOURSELF - OR THAT YOU ARE A FAILURE OR HAVE LET YOURSELF OR YOUR FAMILY DOWN: 0
SUM OF ALL RESPONSES TO PHQ QUESTIONS 1-9: 5

## 2023-02-02 NOTE — PROGRESS NOTES
Chief Complaint   Patient presents with    Sleep Problem    Depression    Anxiety       HPI: Sky  presents for evaluation and management of depression, anxiety, insomnia shortness of breath. Sky notes that he did not like the Tyler so he quit taking it. He did follow-up with Dr. Nuno Kim and is going to some dialectic behavioral therapy which is helping him. He is not currently taking any medications for depression or anxiety other than Klonopin which he uses to help him sleep and with his hypnic jerks. He notes he continues to have difficulty falling asleep and has been tried on both cubic and Belsomra and did not like those as he felt they were ineffective and he is currently using melatonin at 10 mg at night without a lot of benefit. He did going get his pulmonary function test and chest x-rays in November which were normal.    Today's PHQ:    PHQ Scores 2/2/2023 12/1/2022 11/28/2022 10/27/2022 3/4/2022 2/11/2021   PHQ2 Score 0 2 0 2 0 0   PHQ9 Score 5 12 11 16 0 0     Interpretation of Total Score Depression Severity: 1-4 = Minimal depression, 5-9 = Mild depression, 10-14 = Moderate depression, 15-19 = Moderately severe depression, 20-27 = Severe depression        Review of Systems    No Known Allergies  New Prescriptions    TRAZODONE (DESYREL) 100 MG TABLET    Take 1 tablet by mouth nightly as needed for Sleep     Current Outpatient Medications   Medication Sig Dispense Refill    traZODone (DESYREL) 100 MG tablet Take 1 tablet by mouth nightly as needed for Sleep 30 tablet 5    clonazePAM (KLONOPIN) 2 MG tablet 1-2 tabs QHS 60 tablet 5     No current facility-administered medications for this visit.        Past Medical History:   Diagnosis Date    Anxiety 2/11/2021    Attention deficit hyperactivity disorder, combined type 6/9/2010    Atypical tic disorder     2/2 adderall:  seen at [facility] Neurologic Functional Disorder    Hypnic jerks 8/31/2020    REM sleep behavior disorder          Objective /75   Pulse 98   Wt 216 lb 9.6 oz (98.2 kg)   BMI 29.38 kg/m²   Wt Readings from Last 3 Encounters:   02/02/23 216 lb 9.6 oz (98.2 kg)   01/10/23 215 lb 3.2 oz (97.6 kg)   11/28/22 211 lb (95.7 kg)       Physical Exam  Constitutional:       Appearance: He is well-developed. Cardiovascular:      Rate and Rhythm: Normal rate and regular rhythm. Pulses:           Dorsalis pedis pulses are 2+ on the right side and 2+ on the left side. Posterior tibial pulses are 2+ on the right side and 2+ on the left side. Heart sounds: No murmur heard. No friction rub. No gallop. Comments: No edema lower extremities  Pulmonary:      Effort: Pulmonary effort is normal.      Breath sounds: Normal breath sounds. No wheezing or rales. Abdominal:      General: Bowel sounds are normal. There is no distension. Palpations: Abdomen is soft. There is no mass. Tenderness: There is no abdominal tenderness. Skin:     General: Skin is warm and dry. Findings: No rash. Chemistry        Component Value Date/Time     12/02/2021 0922    K 4.0 12/02/2021 0922     12/02/2021 0922    CO2 27 12/02/2021 0922    BUN 16 12/02/2021 0922    CREATININE 1.0 12/02/2021 0922        Component Value Date/Time    CALCIUM 8.8 12/02/2021 0922    ALKPHOS 51 12/02/2021 0922    AST 31 12/02/2021 0922    ALT 26 12/02/2021 0922    BILITOT 0.3 12/02/2021 2200          Lab Results   Component Value Date    WBC 3.4 (L) 12/02/2021    HGB 15.1 12/02/2021    HCT 45.7 12/02/2021    MCV 87.9 12/02/2021     12/02/2021     No results found for: LABA1C  No results found for: EAG  No results found for: LABA1C  No components found for: CHLPL  No results found for: TRIG  No results found for: HDL  No results found for: LDLCALC  No results found for: LABVLDL      Assessment   Plan   1.  Current moderate episode of major depressive disorder without prior episode ([address]  Improved with dialectic behavioral therapy. Counseled patient to continue this and we will monitor and see him back in 6 months  - [place] BEHAV ASSMT W/SCORE & DOCD/STAND INSTRUMENT    2. Anxiety  As above, patient's ERIKA-7 score was 3 today. This is a dramatic improvement  - AR BEHAV ASSMT W/SCORE & DOCD/STAND INSTRUMENT    3. Shortness of breath  Work-up so far has been negative and he does not endorse shortness of breath today    4. Primary insomnia  We will trial trazodone at the 100 mg dosage. If needed patient can titrate up to 150 mg or down to 50 mg depending upon his response. He will follow-up with Dr. Daina Farah on treatment for his hypnic jerks  - traZODone (DESYREL) 100 MG tablet; Take 1 tablet by mouth nightly as needed for Sleep  Dispense: 30 tablet; Refill: 5        RTC Return in about 6 months (around 8/2/2023).

## 2023-02-28 ENCOUNTER — TELEPHONE (OUTPATIENT)
Dept: BARIATRICS/WEIGHT MGMT | Age: 23
End: 2023-02-28

## 2023-02-28 NOTE — TELEPHONE ENCOUNTER
Pt will upload paperwork to my chart but will bring ID & Insurance card and arrive 15 minutes prior.  Confirmed location

## 2023-03-01 ENCOUNTER — OFFICE VISIT (OUTPATIENT)
Dept: BARIATRICS/WEIGHT MGMT | Age: 23
End: 2023-03-01

## 2023-03-01 VITALS
DIASTOLIC BLOOD PRESSURE: 74 MMHG | WEIGHT: 222.6 LBS | HEIGHT: 72 IN | OXYGEN SATURATION: 98 % | BODY MASS INDEX: 30.15 KG/M2 | TEMPERATURE: 97.9 F | HEART RATE: 82 BPM | SYSTOLIC BLOOD PRESSURE: 114 MMHG

## 2023-03-01 DIAGNOSIS — E66.9 CLASS 1 OBESITY WITH BODY MASS INDEX (BMI) OF 30.0 TO 30.9 IN ADULT, UNSPECIFIED OBESITY TYPE, UNSPECIFIED WHETHER SERIOUS COMORBIDITY PRESENT: Primary | ICD-10-CM

## 2023-03-01 PROCEDURE — 99999 PR OFFICE/OUTPT VISIT,PROCEDURE ONLY: CPT | Performed by: FAMILY MEDICINE

## 2023-03-01 NOTE — PROGRESS NOTES
Wallene Cranker is a 23 y.o. male with a date of birth of 2000. Vitals:    03/01/23 1317   BP: 114/74   Site: Left Upper Arm   Position: Sitting   Cuff Size: Large Adult   Pulse: 82   Temp: 97.9 °F (36.6 °C)   TempSrc: Temporal   SpO2: 98%   Weight: 222 lb 9.6 oz (101 kg)   Height: 6' (1.829 m)    BMI: Body mass index is 30.19 kg/m². Obesity Classification: Class II    Weight History: Wt Readings from Last 3 Encounters:   03/01/23 222 lb 9.6 oz (101 kg)   02/02/23 216 lb 9.6 oz (98.2 kg)   01/10/23 215 lb 3.2 oz (97.6 kg)     Patient's lowest adult weight was 115-120's lb at age 23. Patient's highest adult weight was 222 lb at age 23 - current. Patient has participated in the following weight loss programs: calorie restriction, low carb and juice cleanse. Patient has not participated in meal replacement/liquid diets. Patient has not participated in weight loss medications. Patient is not lactose intolerant. Patient does not have food allergies. Patient does not have Yarsanism/cultural food preferences. 24 hour recall/food frequency chart: [place] [id number] 9 am- 5 pm. Reports SO cooks or uses RTE foods or take out. Breakfast: None  Snack: None  Lunch: 12 PM :2 sausage/cheese biscuits   Snack: 3-4 PM: 3 slices pizza  Dinner: 7-8 PM: Chipotle   Snack: 10 PM: Leftovers OR frozen meal OR None  Drinks throughout the day: water, 1-2 cups coffee  Do you drink alcohol? yes. How often/how much alcohol do you drink: 6-12 Light Beers per week. Patient does not meet the criteria for binge eating disorder. Patient does have grazing. Patient does not have night eating. Patient does have a history of emotional eating or eating out of boredom.     Physical Activity: resistance/weights 4X/week      Goals  Weight: 185-200 lbs  Health Improvement: improve shin/leg pain     Assessment  Nutritional Needs: RMR=(9.99 x 101) + (6.25 x 182.9) - (4.92 x 22 y.o.) +5= 2039 kcal x 1.3 (sedentary activity factor)= 2651 kcal - 1000 (for 2 lb weight loss/week)= 1651 kcal.    Plan  Plan/Recommendations: General weight loss/lifestyle modification strategies discussed (elicit support from others; identify saboteurs; non-food rewards, etc). Diet interventions: 3636-6737 kcal LC. Regular aerobic exercise program discussed. Optifast:  Interested - samples provided  Diet Medications: May be Interested     Handouts: 9 inch LC plate, protein drinks, frozen meals    PES Statement: Overweight/Obesity related to lack of exercise, sedentary lifestyle, unhealthy eating habits, and unsuccessful diet attempts as evidenced by BMI. Body mass index is 30.19 kg/m². Will follow up as necessary.     Ramiro Pickett, RD, LD

## 2023-03-02 ENCOUNTER — TELEMEDICINE (OUTPATIENT)
Dept: BARIATRICS/WEIGHT MGMT | Age: 23
End: 2023-03-02
Payer: COMMERCIAL

## 2023-03-02 DIAGNOSIS — Z71.3 DIETARY COUNSELING AND SURVEILLANCE: ICD-10-CM

## 2023-03-02 DIAGNOSIS — E66.9 CLASS 1 OBESITY: Primary | ICD-10-CM

## 2023-03-02 PROCEDURE — 99204 OFFICE O/P NEW MOD 45 MIN: CPT | Performed by: FAMILY MEDICINE

## 2023-03-02 ASSESSMENT — ENCOUNTER SYMPTOMS
VOMITING: 0
BLOOD IN STOOL: 0
APNEA: 0
CHOKING: 0
DIARRHEA: 0
ABDOMINAL PAIN: 0
CONSTIPATION: 0
WHEEZING: 0
CHEST TIGHTNESS: 0
PHOTOPHOBIA: 0
EYE PAIN: 0
COUGH: 0
SHORTNESS OF BREATH: 0
ABDOMINAL DISTENTION: 0
NAUSEA: 0

## 2023-03-02 NOTE — PROGRESS NOTES
Patient: Martine Boyer     Encounter Date: 3/2/2023    YOB: 2000               Age: 23 y.o. Patient identification was verified at the start of the visit. Patient-Reported Vitals 3/2/2023   Patient-Reported Weight 220   Patient-Reported Height 60   Patient-Reported Temperature 97         BP Readings from Last 1 Encounters:   03/27/23 121/67       BMI Readings from Last 1 Encounters:   03/27/23 28.68 kg/m²       Pulse Readings from Last 1 Encounters:   03/27/23 67                                             Wt Readings from Last 3 Encounters:   03/27/23 217 lb 6 oz (98.6 kg)   03/24/23 217 lb (98.4 kg)   03/01/23 222 lb 9.6 oz (101 kg)        Chief Complaint   Patient presents with    Bariatric, Initial Visit     MW[facility]- NP        HPI:    23 y.o. male presents to establish care via video visit. He was referred by Dr. Leonel Hui for weight management. The patient's medical history is significant for class I obesity. The patient has a long-standing history of obesity which started gradually. The problem is mild. The patient has been gaining weight. Risk factors include annual weight gain of >2 lbs (1 kg)/ year and sedentary lifestyle. Aggravating factors include poor diet. The patient has tried various diet/exercise plans which have been ineffective in the long-run. he is motivated to start losing weight to help improve his health. Interested in [employer].     When did you become overweight? [] Childhood   [] Teens   [x] Adulthood   [] Pregnancy   [] Menopause    Highest adult weight: 222 pounds at current age     Triggers for weight gain? [] Stress   [] Illness   [] Medications   [] Travel  []Injury     [] Nightshift work   [] Insomnia  [] No specific triggers   [] Other    Food triggers:   [x] Stress   [x] Boredom   [] Fast food   [] Eating out   [] Seeking reward   [] Social     Have you ever taken prescription medications to help you lose weight?    [] Yes  [x] No    Have you ever

## 2023-03-24 ENCOUNTER — OFFICE VISIT (OUTPATIENT)
Dept: PRIMARY CARE CLINIC | Age: 23
End: 2023-03-24
Payer: COMMERCIAL

## 2023-03-24 VITALS
DIASTOLIC BLOOD PRESSURE: 68 MMHG | BODY MASS INDEX: 28.76 KG/M2 | WEIGHT: 217 LBS | SYSTOLIC BLOOD PRESSURE: 126 MMHG | HEART RATE: 74 BPM | HEIGHT: 73 IN | TEMPERATURE: 98.4 F

## 2023-03-24 DIAGNOSIS — F32.1 CURRENT MODERATE EPISODE OF MAJOR DEPRESSIVE DISORDER WITHOUT PRIOR EPISODE (HCC): Primary | ICD-10-CM

## 2023-03-24 DIAGNOSIS — L30.9 ECZEMA, UNSPECIFIED TYPE: ICD-10-CM

## 2023-03-24 DIAGNOSIS — F51.01 PRIMARY INSOMNIA: ICD-10-CM

## 2023-03-24 DIAGNOSIS — F51.8 HYPNIC JERKS: ICD-10-CM

## 2023-03-24 PROCEDURE — 99214 OFFICE O/P EST MOD 30 MIN: CPT | Performed by: FAMILY MEDICINE

## 2023-03-24 RX ORDER — KETOCONAZOLE 20 MG/G
CREAM TOPICAL
Qty: 30 G | Refills: 1 | Status: SHIPPED | OUTPATIENT
Start: 2023-03-24

## 2023-03-24 RX ORDER — KETOCONAZOLE 20 MG/ML
SHAMPOO TOPICAL
COMMUNITY
Start: 2023-02-13

## 2023-03-24 SDOH — ECONOMIC STABILITY: HOUSING INSECURITY
IN THE LAST 12 MONTHS, WAS THERE A TIME WHEN YOU DID NOT HAVE A STEADY PLACE TO SLEEP OR SLEPT IN A SHELTER (INCLUDING NOW)?: NO

## 2023-03-24 SDOH — ECONOMIC STABILITY: INCOME INSECURITY: HOW HARD IS IT FOR YOU TO PAY FOR THE VERY BASICS LIKE FOOD, HOUSING, MEDICAL CARE, AND HEATING?: NOT HARD AT ALL

## 2023-03-24 SDOH — ECONOMIC STABILITY: FOOD INSECURITY: WITHIN THE PAST 12 MONTHS, THE FOOD YOU BOUGHT JUST DIDN'T LAST AND YOU DIDN'T HAVE MONEY TO GET MORE.: NEVER TRUE

## 2023-03-24 SDOH — ECONOMIC STABILITY: FOOD INSECURITY: WITHIN THE PAST 12 MONTHS, YOU WORRIED THAT YOUR FOOD WOULD RUN OUT BEFORE YOU GOT MONEY TO BUY MORE.: NEVER TRUE

## 2023-03-24 ASSESSMENT — PATIENT HEALTH QUESTIONNAIRE - PHQ9
10. IF YOU CHECKED OFF ANY PROBLEMS, HOW DIFFICULT HAVE THESE PROBLEMS MADE IT FOR YOU TO DO YOUR WORK, TAKE CARE OF THINGS AT HOME, OR GET ALONG WITH OTHER PEOPLE: 0
6. FEELING BAD ABOUT YOURSELF - OR THAT YOU ARE A FAILURE OR HAVE LET YOURSELF OR YOUR FAMILY DOWN: 0
SUM OF ALL RESPONSES TO PHQ QUESTIONS 1-9: 0
7. TROUBLE CONCENTRATING ON THINGS, SUCH AS READING THE NEWSPAPER OR WATCHING TELEVISION: 0
SUM OF ALL RESPONSES TO PHQ QUESTIONS 1-9: 0
8. MOVING OR SPEAKING SO SLOWLY THAT OTHER PEOPLE COULD HAVE NOTICED. OR THE OPPOSITE, BEING SO FIGETY OR RESTLESS THAT YOU HAVE BEEN MOVING AROUND A LOT MORE THAN USUAL: 0
SUM OF ALL RESPONSES TO PHQ9 QUESTIONS 1 & 2: 0
SUM OF ALL RESPONSES TO PHQ QUESTIONS 1-9: 0
9. THOUGHTS THAT YOU WOULD BE BETTER OFF DEAD, OR OF HURTING YOURSELF: 0
5. POOR APPETITE OR OVEREATING: 0
3. TROUBLE FALLING OR STAYING ASLEEP: 0
SUM OF ALL RESPONSES TO PHQ QUESTIONS 1-9: 0
2. FEELING DOWN, DEPRESSED OR HOPELESS: 0
1. LITTLE INTEREST OR PLEASURE IN DOING THINGS: 0
4. FEELING TIRED OR HAVING LITTLE ENERGY: 0

## 2023-03-24 NOTE — PROGRESS NOTES
disorder (HCC)     Sleep apnea          Objective   /68   Pulse 74   Temp 98.4 °F (36.9 °C) (Temporal)   Ht 6' 1\" (1.854 m)   Wt 217 lb (98.4 kg)   BMI 28.63 kg/m²   Wt Readings from Last 3 Encounters:   03/24/23 217 lb (98.4 kg)   03/01/23 222 lb 9.6 oz (101 kg)   02/02/23 216 lb 9.6 oz (98.2 kg)       Physical Exam  Constitutional:       Appearance: He is well-developed. Cardiovascular:      Rate and Rhythm: Normal rate and regular rhythm. Heart sounds: No murmur heard. No friction rub. No gallop. Pulmonary:      Effort: Pulmonary effort is normal.      Breath sounds: Normal breath sounds. No wheezing or rales. Abdominal:      General: Bowel sounds are normal. There is no distension. Palpations: Abdomen is soft. There is no mass. Tenderness: There is no abdominal tenderness. Skin:     General: Skin is warm and dry. Findings: Rash present. Comments: Scaly circular rash bilateral arms with mild erythema and central clearing. Patient has hyperkeratotic skin on dorsal aspects of bilateral upper arms as well   Neurological:      General: No focal deficit present. Mental Status: Mental status is at baseline. Cranial Nerves: No cranial nerve deficit. Sensory: No sensory deficit. Motor: No weakness. Psychiatric:         Mood and Affect: Mood normal.         Behavior: Behavior normal.         Thought Content:  Thought content normal.         Chemistry        Component Value Date/Time     12/02/2021 0922    K 4.0 12/02/2021 0922     12/02/2021 0922    CO2 27 12/02/2021 0922    BUN 16 12/02/2021 0922    CREATININE 1.0 12/02/2021 0922        Component Value Date/Time    CALCIUM 8.8 12/02/2021 0922    ALKPHOS 51 12/02/2021 0922    AST 31 12/02/2021 0922    ALT 26 12/02/2021 0922    BILITOT 0.3 12/02/2021 0922          Lab Results   Component Value Date    WBC 3.4 (L) 12/02/2021    HGB 15.1 12/02/2021    HCT 45.7 12/02/2021    MCV 87.9 12/02/2021

## 2023-03-24 NOTE — PATIENT INSTRUCTIONS
For winter dry skin:   As cool a shower as you can tolerate. Moisturizer IMMEDIATELY upon drying off. PAT your skin dry rather than rubbing it. Can try taking fewer showers. [email or web address].       Jose, [address], DO  [facility], [address] [place] [address], [address] [place] [address] Drive  Phone: [phone]

## 2023-03-27 ENCOUNTER — HOSPITAL ENCOUNTER (EMERGENCY)
Age: 23
Discharge: HOME | End: 2023-03-27
Payer: COMMERCIAL

## 2023-03-27 VITALS
OXYGEN SATURATION: 100 % | DIASTOLIC BLOOD PRESSURE: 67 MMHG | HEART RATE: 67 BPM | HEIGHT: 73 IN | WEIGHT: 217.38 LBS | BODY MASS INDEX: 28.81 KG/M2 | RESPIRATION RATE: 12 BRPM | TEMPERATURE: 98.3 F | SYSTOLIC BLOOD PRESSURE: 121 MMHG

## 2023-03-27 DIAGNOSIS — M79.10 MYALGIA: Primary | ICD-10-CM

## 2023-03-27 LAB
ANION GAP SERPL CALCULATED.3IONS-SCNC: 10 MMOL/L (ref 3–16)
BASOPHILS # BLD: 0.1 K/UL (ref 0–0.2)
BASOPHILS NFR BLD: 1 %
BILIRUB UR QL STRIP.AUTO: NEGATIVE
BUN SERPL-MCNC: 17 MG/DL (ref 7–20)
CALCIUM SERPL-MCNC: 9.5 MG/DL (ref 8.3–10.6)
CHLORIDE SERPL-SCNC: 103 MMOL/L (ref 99–110)
CK SERPL-CCNC: 434 U/L (ref 39–308)
CLARITY UR: CLEAR
CO2 SERPL-SCNC: 27 MMOL/L (ref 21–32)
COLOR UR: YELLOW
CREAT SERPL-MCNC: 0.9 MG/DL (ref 0.9–1.3)
DEPRECATED RDW RBC AUTO: 14.4 % (ref 12.4–15.4)
EOSINOPHIL # BLD: 0.2 K/UL (ref 0–0.6)
EOSINOPHIL NFR BLD: 2.5 %
GFR SERPLBLD CREATININE-BSD FMLA CKD-EPI: >60 ML/MIN/{1.73_M2}
GLUCOSE SERPL-MCNC: 99 MG/DL (ref 70–99)
GLUCOSE UR STRIP.AUTO-MCNC: NEGATIVE MG/DL
HCT VFR BLD AUTO: 39.6 % (ref 40.5–52.5)
HGB BLD-MCNC: 13.5 G/DL (ref 13.5–17.5)
HGB UR QL STRIP.AUTO: NEGATIVE
KETONES UR STRIP.AUTO-MCNC: NEGATIVE MG/DL
LEUKOCYTE ESTERASE UR QL STRIP.AUTO: NEGATIVE
LYMPHOCYTES # BLD: 2.1 K/UL (ref 1–5.1)
LYMPHOCYTES NFR BLD: 31.7 %
MCH RBC QN AUTO: 29.4 PG (ref 26–34)
MCHC RBC AUTO-ENTMCNC: 34.1 G/DL (ref 31–36)
MCV RBC AUTO: 86.3 FL (ref 80–100)
MONOCYTES # BLD: 0.6 K/UL (ref 0–1.3)
MONOCYTES NFR BLD: 9.1 %
NEUTROPHILS # BLD: 3.7 K/UL (ref 1.7–7.7)
NEUTROPHILS NFR BLD: 55.7 %
NITRITE UR QL STRIP.AUTO: NEGATIVE
PH UR STRIP.AUTO: 7 [PH] (ref 5–8)
PLATELET # BLD AUTO: 234 K/UL (ref 135–450)
PMV BLD AUTO: 9.9 FL (ref 5–10.5)
POTASSIUM SERPL-SCNC: 4.7 MMOL/L (ref 3.5–5.1)
PROT UR STRIP.AUTO-MCNC: NEGATIVE MG/DL
RBC # BLD AUTO: 4.59 M/UL (ref 4.2–5.9)
SODIUM SERPL-SCNC: 140 MMOL/L (ref 136–145)
SP GR UR STRIP.AUTO: 1.02 (ref 1–1.03)
TROPONIN T SERPL-MCNC: <0.01 NG/ML
UA COMPLETE W REFLEX CULTURE PNL UR: NORMAL
UA DIPSTICK W REFLEX MICRO PNL UR: NORMAL
URN SPEC COLLECT METH UR: NORMAL
UROBILINOGEN UR STRIP-ACNC: 0.2 E.U./DL
WBC # BLD AUTO: 6.6 K/UL (ref 4–11)

## 2023-03-27 PROCEDURE — 2580000003 HC RX 258: Performed by: PHYSICIAN ASSISTANT

## 2023-03-27 PROCEDURE — 81003 URINALYSIS AUTO W/O SCOPE: CPT

## 2023-03-27 PROCEDURE — 82746 ASSAY OF FOLIC ACID SERUM: CPT

## 2023-03-27 PROCEDURE — 82607 VITAMIN B-12: CPT

## 2023-03-27 PROCEDURE — 6360000002 HC RX W HCPCS: Performed by: PHYSICIAN ASSISTANT

## 2023-03-27 PROCEDURE — 85025 COMPLETE CBC W/AUTO DIFF WBC: CPT

## 2023-03-27 PROCEDURE — 96374 THER/PROPH/DIAG INJ IV PUSH: CPT

## 2023-03-27 PROCEDURE — 82550 ASSAY OF CK (CPK): CPT

## 2023-03-27 PROCEDURE — 80048 BASIC METABOLIC PNL TOTAL CA: CPT

## 2023-03-27 PROCEDURE — 36415 COLL VENOUS BLD VENIPUNCTURE: CPT

## 2023-03-27 PROCEDURE — 93005 ELECTROCARDIOGRAM TRACING: CPT | Performed by: PHYSICIAN ASSISTANT

## 2023-03-27 PROCEDURE — 99284 EMERGENCY DEPT VISIT MOD MDM: CPT

## 2023-03-27 PROCEDURE — 84484 ASSAY OF TROPONIN QUANT: CPT

## 2023-03-27 RX ORDER — 0.9 % SODIUM CHLORIDE 0.9 %
1000 INTRAVENOUS SOLUTION INTRAVENOUS ONCE
Status: COMPLETED | OUTPATIENT
Start: 2023-03-27 | End: 2023-03-27

## 2023-03-27 RX ORDER — NAPROXEN 500 MG/1
500 TABLET ORAL 2 TIMES DAILY WITH MEALS
Qty: 30 TABLET | Refills: 0 | Status: SHIPPED | OUTPATIENT
Start: 2023-03-27

## 2023-03-27 RX ORDER — KETOROLAC TROMETHAMINE 15 MG/ML
15 INJECTION, SOLUTION INTRAMUSCULAR; INTRAVENOUS ONCE
Status: COMPLETED | OUTPATIENT
Start: 2023-03-27 | End: 2023-03-27

## 2023-03-27 RX ADMIN — SODIUM CHLORIDE 1000 ML: 9 INJECTION, SOLUTION INTRAVENOUS at 20:20

## 2023-03-27 RX ADMIN — KETOROLAC TROMETHAMINE 15 MG: 15 INJECTION, SOLUTION INTRAMUSCULAR; INTRAVENOUS at 18:35

## 2023-03-27 ASSESSMENT — PAIN - FUNCTIONAL ASSESSMENT
PAIN_FUNCTIONAL_ASSESSMENT: 0-10
PAIN_FUNCTIONAL_ASSESSMENT: NONE - DENIES PAIN

## 2023-03-27 ASSESSMENT — PAIN DESCRIPTION - FREQUENCY: FREQUENCY: CONTINUOUS

## 2023-03-27 ASSESSMENT — PAIN DESCRIPTION - PAIN TYPE: TYPE: ACUTE PAIN

## 2023-03-27 ASSESSMENT — PAIN SCALES - GENERAL
PAINLEVEL_OUTOF10: 8
PAINLEVEL_OUTOF10: 8

## 2023-03-27 ASSESSMENT — PAIN DESCRIPTION - ORIENTATION: ORIENTATION: RIGHT;LEFT

## 2023-03-27 ASSESSMENT — PAIN DESCRIPTION - LOCATION: LOCATION: LEG;ARM

## 2023-03-27 ASSESSMENT — PAIN DESCRIPTION - DESCRIPTORS: DESCRIPTORS: ACHING

## 2023-03-28 LAB
EKG ATRIAL RATE: 61 BPM
EKG DIAGNOSIS: NORMAL
EKG P AXIS: 20 DEGREES
EKG P-R INTERVAL: 130 MS
EKG Q-T INTERVAL: 384 MS
EKG QRS DURATION: 82 MS
EKG QTC CALCULATION (BAZETT): 386 MS
EKG R AXIS: 29 DEGREES
EKG T AXIS: 26 DEGREES
EKG VENTRICULAR RATE: 61 BPM
FOLATE SERPL-MCNC: >20 NG/ML (ref 4.78–24.2)
VIT B12 SERPL-MCNC: 542 PG/ML (ref 211–911)

## 2023-03-28 PROCEDURE — 93010 ELECTROCARDIOGRAM REPORT: CPT | Performed by: INTERNAL MEDICINE

## 2023-03-28 NOTE — DISCHARGE INSTRUCTIONS
Follow-up with your primary care doctor in 5 to 7 days for reevaluation as well as neurologist as you have previously scheduled. Continue to stay hydrated. You can take Tylenol or ibuprofen for pain as needed. You can also try to ice or apply heat for local relief.

## 2023-03-28 NOTE — ED PROVIDER NOTES
The Ekg interpreted by me in the absence of a cardiologist shows. normal sinus rhythm with a rate of 61  Axis is   Normal  QTc is  normal  Intervals and Durations are unremarkable. Diffuse J-point elevation without any reciprocal changes consistent with early repolarization  No evidence of acute ischemia.    No significant change from prior EKG dated 10/27/22       Patient was seen primarily by RENEE, I had no clinical contact with patient but was available for consultation     Esperanza Zee MD  03/27/23 2022

## 2023-03-28 NOTE — ED NOTES
Patient prepared for and ready to be discharged. Patient discharged at this time in no acute distress after verbalizing understanding of discharge instructions. Patient left after receiving After Visit Summary instructions.         Elena Chang RN  03/27/23 2182

## 2023-03-28 NOTE — ED PROVIDER NOTES
[address]        Pt Name: Steven Carrington  MRN: [id number]  [place] 2000  Date of evaluation: 3/27/2023  Provider: Meryle Canavan, PA-C  PCP: Sabrina Burdick MD  Note Started: 9:25 PM EDT 3/27/23      RENEE. I have evaluated this patient. My supervising physician was available for consultation. CHIEF COMPLAINT       Chief Complaint   Patient presents with    Arm Pain     C/o moderate pain in both arms and legs x 2 weeks. He denies any injury. He denies working out any harder at the gym        HISTORY OF PRESENT ILLNESS: 1 or more Elements     History From: patient  Limitations to history : None    Steven Carrington is a 23 y.o. male who presents to the emergency department by private vehicle. Patient states he has had pain from his lower bicep/tricep region radiating down towards his wrist bilaterally and shins bilaterally for the past week. Symptoms have gradually worsened in severity. He has not taking thing for pain. Pain described as sharp and aching sensation. He reports intermittent numbness and tingling to arms if held in 1 position for prolonged period. Sensation does return after movement of extremities. He denies any trauma or injury to head/neck/back. Denies any neck/back pain. States he is physically active, no definitive injuries. Denies any urinary/bowel incontinence, urinary retention. No history of alcohol abuse. Denies having symptoms like this previously. States has had intermittent chest pain over the past several months. Has been seen by his PCP for this. Denies any new or changing pain at this time. Nursing Notes were all reviewed and agreed with or any disagreements were addressed in the HPI. REVIEW OF SYSTEMS :      Review of Systems    Positives and Pertinent negatives as per HPI.      SURGICAL HISTORY     Past Surgical History:   Procedure Laterality Date    NASAL SEPTUM SURGERY

## 2023-07-06 ENCOUNTER — HOSPITAL ENCOUNTER (OUTPATIENT)
Age: 23
Discharge: HOME | End: 2023-07-06
Payer: COMMERCIAL

## 2023-07-06 ENCOUNTER — HOSPITAL ENCOUNTER (OUTPATIENT)
Dept: GENERAL RADIOLOGY | Age: 23
Discharge: HOME | End: 2023-07-06
Payer: COMMERCIAL

## 2023-07-06 ENCOUNTER — OFFICE VISIT (OUTPATIENT)
Dept: PRIMARY CARE CLINIC | Age: 23
End: 2023-07-06
Payer: COMMERCIAL

## 2023-07-06 VITALS
SYSTOLIC BLOOD PRESSURE: 124 MMHG | HEART RATE: 80 BPM | TEMPERATURE: 98.1 F | DIASTOLIC BLOOD PRESSURE: 77 MMHG | WEIGHT: 212.8 LBS | HEIGHT: 73 IN | BODY MASS INDEX: 28.2 KG/M2

## 2023-07-06 DIAGNOSIS — S86.899A ANTERIOR SHIN SPLINTS: ICD-10-CM

## 2023-07-06 DIAGNOSIS — M79.7 FIBROMYALGIA: Primary | ICD-10-CM

## 2023-07-06 DIAGNOSIS — M25.521 RIGHT ELBOW PAIN: ICD-10-CM

## 2023-07-06 DIAGNOSIS — M54.2 TENDERNESS OF NECK: ICD-10-CM

## 2023-07-06 DIAGNOSIS — G47.52 REM SLEEP BEHAVIOR DISORDER: ICD-10-CM

## 2023-07-06 PROCEDURE — 73590 X-RAY EXAM OF LOWER LEG: CPT

## 2023-07-06 PROCEDURE — 73080 X-RAY EXAM OF ELBOW: CPT

## 2023-07-06 PROCEDURE — 99214 OFFICE O/P EST MOD 30 MIN: CPT | Performed by: STUDENT IN AN ORGANIZED HEALTH CARE EDUCATION/TRAINING PROGRAM

## 2023-07-06 RX ORDER — DULOXETIN HYDROCHLORIDE 20 MG/1
20 CAPSULE, DELAYED RELEASE ORAL DAILY
Qty: 30 CAPSULE | Refills: 0 | Status: SHIPPED | OUTPATIENT
Start: 2023-07-06

## 2023-07-06 ASSESSMENT — ENCOUNTER SYMPTOMS
NAUSEA: 0
DIARRHEA: 0
WHEEZING: 0
SHORTNESS OF BREATH: 0
CONSTIPATION: 0
BACK PAIN: 0

## 2023-07-07 ENCOUNTER — HOSPITAL ENCOUNTER (OUTPATIENT)
Dept: ULTRASOUND IMAGING | Age: 23
Discharge: HOME | End: 2023-07-07
Payer: COMMERCIAL

## 2023-07-07 DIAGNOSIS — M54.2 TENDERNESS OF NECK: ICD-10-CM

## 2023-07-07 PROCEDURE — 76536 US EXAM OF HEAD AND NECK: CPT

## 2023-07-13 ENCOUNTER — OFFICE VISIT (OUTPATIENT)
Dept: SLEEP MEDICINE | Age: 23
End: 2023-07-13
Payer: COMMERCIAL

## 2023-07-13 VITALS
RESPIRATION RATE: 18 BRPM | SYSTOLIC BLOOD PRESSURE: 114 MMHG | DIASTOLIC BLOOD PRESSURE: 76 MMHG | WEIGHT: 209.7 LBS | OXYGEN SATURATION: 98 % | BODY MASS INDEX: 27.79 KG/M2 | TEMPERATURE: 97.8 F | HEART RATE: 86 BPM | HEIGHT: 73 IN

## 2023-07-13 DIAGNOSIS — G47.52 REM SLEEP BEHAVIOR DISORDER: ICD-10-CM

## 2023-07-13 DIAGNOSIS — G25.81 RLS (RESTLESS LEGS SYNDROME): ICD-10-CM

## 2023-07-13 DIAGNOSIS — F51.8 HYPNIC JERKS: Primary | ICD-10-CM

## 2023-07-13 PROCEDURE — 99213 OFFICE O/P EST LOW 20 MIN: CPT | Performed by: PSYCHIATRY & NEUROLOGY

## 2023-07-13 RX ORDER — CLONAZEPAM 2 MG/1
TABLET ORAL
Qty: 60 TABLET | Refills: 5 | Status: SHIPPED | OUTPATIENT
Start: 2023-07-13 | End: 2026-07-18

## 2023-07-13 ASSESSMENT — SLEEP AND FATIGUE QUESTIONNAIRES
HOW LIKELY ARE YOU TO NOD OFF OR FALL ASLEEP WHEN YOU ARE A PASSENGER IN A CAR FOR AN HOUR WITHOUT A BREAK: 1
ESS TOTAL SCORE: 3
HOW LIKELY ARE YOU TO NOD OFF OR FALL ASLEEP IN A CAR, WHILE STOPPED FOR A FEW MINUTES IN TRAFFIC: 0
HOW LIKELY ARE YOU TO NOD OFF OR FALL ASLEEP WHILE LYING DOWN TO REST IN THE AFTERNOON WHEN CIRCUMSTANCES PERMIT: 2
HOW LIKELY ARE YOU TO NOD OFF OR FALL ASLEEP WHILE SITTING INACTIVE IN A PUBLIC PLACE: 0
HOW LIKELY ARE YOU TO NOD OFF OR FALL ASLEEP WHILE SITTING AND TALKING TO SOMEONE: 0
HOW LIKELY ARE YOU TO NOD OFF OR FALL ASLEEP WHILE WATCHING TV: 0
HOW LIKELY ARE YOU TO NOD OFF OR FALL ASLEEP WHILE SITTING QUIETLY AFTER LUNCH WITHOUT ALCOHOL: 0
HOW LIKELY ARE YOU TO NOD OFF OR FALL ASLEEP WHILE SITTING AND READING: 0

## 2023-07-13 NOTE — PROGRESS NOTES
MD RYDER Diaz Board Certified in Sleep Medicine  Certified in [address] Certified in Neurology [address] [facility], [facility] [facility]  [phone]   [address] [place] [address], [facility] [address]eb B[address] [facility] [address]                      [address]  [address]  [address] [place] [place] [address]  [phone]    Subjective:     Patient ID: Meryl Sanchez is a 20 y.o. male. Chief Complaint   Patient presents with    Follow-up       HPI:        Meryl Sanchez is a 20 y.o. male was seen today as a follow for REM sleep behavior disorder and RLS. No much hypnic jerk or moving during the REM with Klonopin 4 mg.  most of the night 4  mg. Also on Melatonin 10 mg. The Bishnu Wale did not do anything. Patient improved regarding daytime sleepiness and fatigue, wakes up refreshed in the morning. DOT/CDL - N/A        Previous Report(s)Reviewed: historical medical records         Social History     Socioeconomic History    Marital status: Single     Spouse name: Not on file    Number of children: Not on file    Years of education: Not on file    Highest education level: Not on file   Occupational History    Occupation: [address] [place] [address]   Tobacco Use    Smoking status: Former     Types: E-Cigarettes     Passive exposure: Past    Smokeless tobacco: Never   Vaping Use    Vaping Use: Former    Substances: Nicotine   Substance and Sexual Activity    Alcohol use:  Yes     Alcohol/week: 6.0 standard drinks     Types: 6 Cans of beer per week     Comment: weekends/social drinking only    Drug use: Not Currently     Types: Marijuana Sheryl Pelletier)    Sexual activity: Yes     Partners: Female   Other Topics Concern    Not on file   Social History Narrative    Not on file     Social Determinants of Health     Financial Resource Strain:

## 2023-09-20 DIAGNOSIS — M79.7 FIBROMYALGIA: ICD-10-CM

## 2023-09-20 NOTE — TELEPHONE ENCOUNTER
Medication:   Requested Prescriptions     Pending Prescriptions Disp Refills    DULoxetine (CYMBALTA) 20 MG extended release capsule [Pharmacy Med Name: DULOXETINE DR 20MG CAPSULES] 30 capsule 0     Sig: TAKE 1 CAPSULE BY MOUTH DAILY        Last Filled:  07/6/23    Patient Phone Number: [phone] (home) [phone] (work)    Last appt: 7/6/2023 RTC Return in about 6 months (around 10/13/2023).    Next appt: Visit date not found    Last OARRS:        No data to display

## 2023-09-21 RX ORDER — DULOXETIN HYDROCHLORIDE 20 MG/1
20 CAPSULE, DELAYED RELEASE ORAL DAILY
Qty: 30 CAPSULE | Refills: 0 | Status: SHIPPED | OUTPATIENT
Start: 2023-09-21 | End: 2023-09-29

## 2023-09-29 ENCOUNTER — OFFICE VISIT (OUTPATIENT)
Dept: PRIMARY CARE CLINIC | Age: 23
End: 2023-09-29
Payer: COMMERCIAL

## 2023-09-29 VITALS
HEIGHT: 72 IN | TEMPERATURE: 98.4 F | SYSTOLIC BLOOD PRESSURE: 110 MMHG | DIASTOLIC BLOOD PRESSURE: 66 MMHG | BODY MASS INDEX: 29.66 KG/M2 | HEART RATE: 95 BPM | WEIGHT: 219 LBS | OXYGEN SATURATION: 98 %

## 2023-09-29 DIAGNOSIS — G89.29 CHRONIC BILATERAL LOW BACK PAIN WITHOUT SCIATICA: Primary | ICD-10-CM

## 2023-09-29 DIAGNOSIS — M79.7 FIBROMYALGIA: ICD-10-CM

## 2023-09-29 DIAGNOSIS — F44.4 FUNCTIONAL NEUROLOGICAL SYMPTOM DISORDER WITH ABNORMAL MOVEMENT: ICD-10-CM

## 2023-09-29 DIAGNOSIS — M25.561 CHRONIC PAIN OF BOTH KNEES: ICD-10-CM

## 2023-09-29 DIAGNOSIS — M54.50 CHRONIC BILATERAL LOW BACK PAIN WITHOUT SCIATICA: Primary | ICD-10-CM

## 2023-09-29 DIAGNOSIS — M25.562 CHRONIC PAIN OF BOTH KNEES: ICD-10-CM

## 2023-09-29 DIAGNOSIS — G89.29 CHRONIC PAIN OF BOTH KNEES: ICD-10-CM

## 2023-09-29 DIAGNOSIS — S86.899A ANTERIOR SHIN SPLINTS: ICD-10-CM

## 2023-09-29 PROBLEM — S86.891A SHIN SPLINT, RIGHT, INITIAL ENCOUNTER: Status: RESOLVED | Noted: 2021-04-24 | Resolved: 2023-09-29

## 2023-09-29 PROCEDURE — 99214 OFFICE O/P EST MOD 30 MIN: CPT | Performed by: STUDENT IN AN ORGANIZED HEALTH CARE EDUCATION/TRAINING PROGRAM

## 2023-09-29 RX ORDER — KETOCONAZOLE 20 MG/ML
SHAMPOO TOPICAL
COMMUNITY
Start: 2023-08-17

## 2023-09-29 RX ORDER — DULOXETIN HYDROCHLORIDE 20 MG/1
40 CAPSULE, DELAYED RELEASE ORAL DAILY
Qty: 60 CAPSULE | Refills: 2
Start: 2023-09-29

## 2023-09-29 ASSESSMENT — ENCOUNTER SYMPTOMS
NAUSEA: 0
SHORTNESS OF BREATH: 0
WHEEZING: 0

## 2023-09-29 NOTE — PROGRESS NOTES
[facility]  Establish care visit   2023    Davina Rodriguez (:  2000) is a 20 y.o. male, here to establish care. Chief Complaint   Patient presents with    Medication Check    Joint Pain        ASSESSMENT/ PLAN  1. Chronic bilateral low back pain without sciatica  2. Anterior shin splints  3. Chronic pain of both knees  Uncontrolled, this is a persistent problem. Previous extensive work-up reviewed without identifiable cause. Likely related to fibromyalgia. We will increase Cymbalta to 40 mg today, and referral placed to physical therapy again. Strongly encouraged patient to schedule. Continue follow-up with neurology for functional neurological disorder.  - [facility]    4. Fibromyalgia  Per above  - DULoxetine (CYMBALTA) 20 MG extended release capsule; Take 2 capsules by mouth daily  Dispense: 60 capsule; Refill: 2  - DARRYL Reflex to Antibody Cascade; Future    5. Functional neurological symptom disorder with abnormal movement - eval'd by Neuro  Per above       Return in about 4 weeks (around 10/27/2023) for cymbalta, joint pain . HPI  Patient presents today for follow-up on joint pains. He has had extensive work-ups with neurology and rheumatology without organic cause of his pain. At previous appointment, we performed multiple x-rays which were all normal.  We have been treating him for fibromyalgia with Cymbalta. He has not yet started physical therapy as recommended. Notes that his pain is slightly better since starting the Cymbalta. He notes residual pain in his back, knees and shins. No new injury. Not currently exercising. No joint swelling, rashes, unintentional weight loss, GI symptoms. States that one of his previous DARRYL test was mildly positive, and he is requesting that that be rechecked today. ROS  Review of Systems   Constitutional:  Negative for activity change and unexpected weight change.    HENT:

## 2023-09-30 LAB — ANA SER QL IA: NEGATIVE

## 2023-10-10 DIAGNOSIS — M79.7 FIBROMYALGIA: ICD-10-CM

## 2023-10-10 RX ORDER — DULOXETIN HYDROCHLORIDE 20 MG/1
40 CAPSULE, DELAYED RELEASE ORAL DAILY
Qty: 60 CAPSULE | Refills: 2 | Status: SHIPPED | OUTPATIENT
Start: 2023-10-10

## 2023-10-13 ENCOUNTER — HOSPITAL ENCOUNTER (OUTPATIENT)
Dept: PHYSICAL THERAPY | Age: 23
Setting detail: THERAPIES SERIES
Discharge: HOME | End: 2023-10-13

## 2023-10-13 DIAGNOSIS — R52 PAIN AGGRAVATED BY STANDING: ICD-10-CM

## 2023-10-13 DIAGNOSIS — R68.89 DECREASED ACTIVITY TOLERANCE: ICD-10-CM

## 2023-10-13 DIAGNOSIS — M54.9 BACK PAIN, UNSPECIFIED BACK LOCATION, UNSPECIFIED BACK PAIN LATERALITY, UNSPECIFIED CHRONICITY: ICD-10-CM

## 2023-10-13 DIAGNOSIS — R26.89 DECREASED FUNCTIONAL MOBILITY: Primary | ICD-10-CM

## 2023-10-13 NOTE — FLOWSHEET NOTE
[address] [address]  Phone: [phone]   Fax:     [phone]    Physical Therapy  Cancellation/No-show Note  Patient Name:  Aneesh Potter  :  2000   Date:  10/13/2023  Cancelled visits to date: 1  No-shows to date: 0    Patient status for today's appointment patient:  [x]  Cancelled  []  Rescheduled appointment  []  No-show     Reason given by patient:  []  Patient ill  []  Conflicting appointment  []  No transportation    []  Conflict with work  []  No reason given  [x]  Other:  Work emergency   Comments:      Phone call information:   []  Phone call made today to patient at _ time at number provided:      []  Patient answered, conversation as follows:    []  Patient did not answer, message left as follows:  [x]  Phone call not made today; patient called to cancel    Electronically signed by:  Franco Hardy, PT, DPT

## 2023-11-10 ENCOUNTER — OFFICE VISIT (OUTPATIENT)
Dept: PRIMARY CARE CLINIC | Age: 23
End: 2023-11-10
Payer: COMMERCIAL

## 2023-11-10 VITALS
TEMPERATURE: 98.2 F | SYSTOLIC BLOOD PRESSURE: 120 MMHG | DIASTOLIC BLOOD PRESSURE: 78 MMHG | HEART RATE: 92 BPM | BODY MASS INDEX: 29.09 KG/M2 | HEIGHT: 72 IN | WEIGHT: 214.8 LBS

## 2023-11-10 DIAGNOSIS — R07.9 CHEST PAIN, UNSPECIFIED TYPE: Primary | ICD-10-CM

## 2023-11-10 DIAGNOSIS — F44.4 FUNCTIONAL NEUROLOGICAL SYMPTOM DISORDER WITH ABNORMAL MOVEMENT: ICD-10-CM

## 2023-11-10 PROBLEM — F51.8 HYPNIC JERKS: Status: RESOLVED | Noted: 2020-08-31 | Resolved: 2023-11-10

## 2023-11-10 PROBLEM — R25.3 FASCICULATIONS: Status: RESOLVED | Noted: 2021-02-11 | Resolved: 2023-11-10

## 2023-11-10 PROCEDURE — 93000 ELECTROCARDIOGRAM COMPLETE: CPT | Performed by: STUDENT IN AN ORGANIZED HEALTH CARE EDUCATION/TRAINING PROGRAM

## 2023-11-10 PROCEDURE — 99214 OFFICE O/P EST MOD 30 MIN: CPT | Performed by: STUDENT IN AN ORGANIZED HEALTH CARE EDUCATION/TRAINING PROGRAM

## 2023-11-10 RX ORDER — NAPROXEN 500 MG/1
500 TABLET ORAL 2 TIMES DAILY WITH MEALS
Qty: 30 TABLET | Refills: 0 | Status: SHIPPED | OUTPATIENT
Start: 2023-11-10

## 2023-11-10 RX ORDER — OMEPRAZOLE 40 MG/1
40 CAPSULE, DELAYED RELEASE ORAL
Qty: 30 CAPSULE | Refills: 0 | Status: SHIPPED | OUTPATIENT
Start: 2023-11-10

## 2023-11-10 ASSESSMENT — ENCOUNTER SYMPTOMS
WHEEZING: 0
SHORTNESS OF BREATH: 0
NAUSEA: 0

## 2023-11-10 NOTE — PROGRESS NOTES
[facility]  11/10/2023    Brent Morris (:  2000) is a 20 y.o. male, here for evaluation of the following medical concerns:    Chief Complaint   Patient presents with    Medication Check    Joint Pain     Chronic bilateral low back pain without sciatica    Chest Pain     3 or 4 days more like constant heartburn         ASSESSMENT/ PLAN  1. Chest pain, unspecified type  Uncontrolled, this is a new problem. EKG NSR with early repolarization, consistent with previous EKG. No history of v fib. Story consistent with GERD, somatization, or possibly pleurisy. Recommend trial of ibuprofen, omeprazole. Reassurance provided. F/U 2 weeks for recheck. Recommend abstinence from any future illicit substance use  - EKG 12 lead  - naproxen (NAPROSYN) 500 MG tablet; Take 1 tablet by mouth 2 times daily (with meals)  Dispense: 30 tablet; Refill: 0  - omeprazole (PRILOSEC) 40 MG delayed release capsule; Take 1 capsule by mouth every morning (before breakfast)  Dispense: 30 capsule; Refill: 0    2. Functional neurological symptom disorder with abnormal movement - eval'd by Neuro  Controlled, continue cymbalta. Return in about 2 weeks (around 2023), or if symptoms worsen or fail to improve, for chest pain. HPI  Patient presents today for follow up on cymbalta with concerns of chest pain. Notes that he developed left sided chest pain insidiously a few days ago. A week ago, he tried cocaine and was concerned this may have caused it. No diaphoresis, exertional component, palpitations, lower extremity swelling. The pain is worse when he coughs. Sometimes occurs with eating. He has tried OTC antacids without improvement. Notes he is a hypochondriac and wanted to ensure he wasn't having a heart attack. No personal history of cardiac anomalies, no family history of early cardiac death. Joint pain significantly improved with cymbalta, which he takes daily as prescribed.      ROS  Review of Systems

## 2024-01-08 ENCOUNTER — OFFICE VISIT (OUTPATIENT)
Dept: PRIMARY CARE CLINIC | Age: 24
End: 2024-01-08
Payer: COMMERCIAL

## 2024-01-08 VITALS
BODY MASS INDEX: 28.79 KG/M2 | WEIGHT: 212.6 LBS | HEART RATE: 104 BPM | HEIGHT: 72 IN | TEMPERATURE: 98 F | SYSTOLIC BLOOD PRESSURE: 123 MMHG | DIASTOLIC BLOOD PRESSURE: 76 MMHG

## 2024-01-08 DIAGNOSIS — N48.9 PENILE LESION: Primary | ICD-10-CM

## 2024-01-08 DIAGNOSIS — N48.9 PENILE LESION: ICD-10-CM

## 2024-01-08 PROCEDURE — 99214 OFFICE O/P EST MOD 30 MIN: CPT | Performed by: STUDENT IN AN ORGANIZED HEALTH CARE EDUCATION/TRAINING PROGRAM

## 2024-01-08 RX ORDER — TRIAMCINOLONE ACETONIDE 1 MG/G
CREAM TOPICAL
Qty: 15 G | Refills: 0 | Status: SHIPPED | OUTPATIENT
Start: 2024-01-08

## 2024-01-08 ASSESSMENT — ENCOUNTER SYMPTOMS
WHEEZING: 0
COLOR CHANGE: 1
NAUSEA: 0
SHORTNESS OF BREATH: 0

## 2024-01-08 ASSESSMENT — PATIENT HEALTH QUESTIONNAIRE - PHQ9
10. IF YOU CHECKED OFF ANY PROBLEMS, HOW DIFFICULT HAVE THESE PROBLEMS MADE IT FOR YOU TO DO YOUR WORK, TAKE CARE OF THINGS AT HOME, OR GET ALONG WITH OTHER PEOPLE: 0
SUM OF ALL RESPONSES TO PHQ9 QUESTIONS 1 & 2: 0
7. TROUBLE CONCENTRATING ON THINGS, SUCH AS READING THE NEWSPAPER OR WATCHING TELEVISION: 0
4. FEELING TIRED OR HAVING LITTLE ENERGY: 0
3. TROUBLE FALLING OR STAYING ASLEEP: 0
6. FEELING BAD ABOUT YOURSELF - OR THAT YOU ARE A FAILURE OR HAVE LET YOURSELF OR YOUR FAMILY DOWN: 0
5. POOR APPETITE OR OVEREATING: 0
SUM OF ALL RESPONSES TO PHQ QUESTIONS 1-9: 0
9. THOUGHTS THAT YOU WOULD BE BETTER OFF DEAD, OR OF HURTING YOURSELF: 0
SUM OF ALL RESPONSES TO PHQ QUESTIONS 1-9: 0
1. LITTLE INTEREST OR PLEASURE IN DOING THINGS: 0
SUM OF ALL RESPONSES TO PHQ QUESTIONS 1-9: 0
2. FEELING DOWN, DEPRESSED OR HOPELESS: 0
SUM OF ALL RESPONSES TO PHQ QUESTIONS 1-9: 0
8. MOVING OR SPEAKING SO SLOWLY THAT OTHER PEOPLE COULD HAVE NOTICED. OR THE OPPOSITE, BEING SO FIGETY OR RESTLESS THAT YOU HAVE BEEN MOVING AROUND A LOT MORE THAN USUAL: 0

## 2024-01-08 NOTE — PROGRESS NOTES
[facility]  2024    Jr Arroyo (:  2000) is a 23 y.o. male, here for evaluation of the following medical concerns:    Chief Complaint   Patient presents with    Other     Red spots on gential area, noticed on Friday, have gotten worse           ASSESSMENT/ PLAN  1. Penile lesion  Uncontrolled, this is new problem.  Differential includes lichen planus, balanitis, erythema multiforme or other.  Will perform STI screening today, initiate treatment with steroid cream and follow-up in 1 week if persistent or worsening  - C.trachomatis N.gonorrhoeae DNA, Urine; Future  - RPR Reflex; Future  - Trichomonas by EIA; Future  - Hepatitis C Antibody; Future  - HIV Screen; Future  - triamcinolone (KENALOG) 0.1 % cream; Apply topically 2 times daily.  Dispense: 15 g; Refill: 0       Return in about 1 week (around 1/15/2024) for skin lesion .    HPI  Patient presents today with concerns of red skin discoloration on the glans of his penis.    Notes that symptoms began about 3 days ago.  He has had a similar looking rash on other areas of his penis which began and resolved spontaneously.  He is circumcised.  He has not been sexually active in the past year.  Denies pruritus.  Notes that there is a discomfort that makes him notice the lesion, but it is not debilitating.  No vesicular areas, no known history of STIs.  He denies penile discharge, dysuria, testicular lesions or swelling.    ROS  Review of Systems   Constitutional:  Negative for activity change and unexpected weight change.   HENT:  Negative for tinnitus.    Eyes:  Negative for visual disturbance.   Respiratory:  Negative for shortness of breath and wheezing.    Cardiovascular:  Negative for chest pain, palpitations and leg swelling.   Gastrointestinal:  Negative for nausea.   Genitourinary:  Negative for decreased urine volume.   Skin:  Positive for color change and rash.   Neurological:  Negative for syncope, light-headedness and

## 2024-01-09 DIAGNOSIS — M79.7 FIBROMYALGIA: ICD-10-CM

## 2024-01-09 LAB
HCV AB SERPL QL IA: NORMAL
HIV 1+2 AB+HIV1 P24 AG SERPL QL IA: NORMAL
HIV 2 AB SERPL QL IA: NORMAL
HIV1 AB SERPL QL IA: NORMAL
HIV1 P24 AG SERPL QL IA: NORMAL
REAGIN+T PALLIDUM IGG+IGM SERPL-IMP: NORMAL
SPECIMEN TYPE: NORMAL
TRICHOMONAS VAGINALIS SCREEN: NEGATIVE

## 2024-01-09 RX ORDER — DULOXETIN HYDROCHLORIDE 20 MG/1
40 CAPSULE, DELAYED RELEASE ORAL DAILY
Qty: 60 CAPSULE | Refills: 5 | Status: SHIPPED | OUTPATIENT
Start: 2024-01-09

## 2024-01-09 NOTE — TELEPHONE ENCOUNTER
Medication:   Requested Prescriptions     Pending Prescriptions Disp Refills    DULoxetine (CYMBALTA) 20 MG extended release capsule 60 capsule 2     Sig: Take 2 capsules by mouth daily        Last Filled:  10/10/2023     Patient Phone Number: [phone] (home) [phone] (work)    Last appt: 1/8/2024   Next appt: 1/25/2024    Last OARRS:        No data to display

## 2024-01-10 LAB
C TRACH DNA UR QL NAA+PROBE: NEGATIVE
N GONORRHOEA DNA UR QL NAA+PROBE: NEGATIVE

## 2024-01-18 ENCOUNTER — OFFICE VISIT (OUTPATIENT)
Dept: SLEEP MEDICINE | Age: 24
End: 2024-01-18

## 2024-01-18 VITALS
TEMPERATURE: 96.8 F | OXYGEN SATURATION: 99 % | RESPIRATION RATE: 18 BRPM | HEIGHT: 72 IN | DIASTOLIC BLOOD PRESSURE: 70 MMHG | HEART RATE: 80 BPM | BODY MASS INDEX: 28.99 KG/M2 | SYSTOLIC BLOOD PRESSURE: 115 MMHG | WEIGHT: 214 LBS

## 2024-01-18 DIAGNOSIS — G25.81 RLS (RESTLESS LEGS SYNDROME): ICD-10-CM

## 2024-01-18 DIAGNOSIS — G47.52 REM SLEEP BEHAVIOR DISORDER: ICD-10-CM

## 2024-01-18 DIAGNOSIS — F51.8 HYPNIC JERKS: ICD-10-CM

## 2024-01-18 PROCEDURE — 99214 OFFICE O/P EST MOD 30 MIN: CPT | Performed by: PSYCHIATRY & NEUROLOGY

## 2024-01-18 RX ORDER — CLONAZEPAM 2 MG/1
TABLET ORAL
Qty: 60 TABLET | Refills: 5 | Status: SHIPPED | OUTPATIENT
Start: 2024-01-18 | End: 2027-01-23

## 2024-01-18 ASSESSMENT — SLEEP AND FATIGUE QUESTIONNAIRES
HOW LIKELY ARE YOU TO NOD OFF OR FALL ASLEEP IN A CAR, WHILE STOPPED FOR A FEW MINUTES IN TRAFFIC: 0
HOW LIKELY ARE YOU TO NOD OFF OR FALL ASLEEP WHILE SITTING AND TALKING TO SOMEONE: 0
HOW LIKELY ARE YOU TO NOD OFF OR FALL ASLEEP WHILE SITTING QUIETLY AFTER LUNCH WITHOUT ALCOHOL: 1
HOW LIKELY ARE YOU TO NOD OFF OR FALL ASLEEP WHILE SITTING INACTIVE IN A PUBLIC PLACE: 0
HOW LIKELY ARE YOU TO NOD OFF OR FALL ASLEEP WHILE SITTING AND READING: 0
HOW LIKELY ARE YOU TO NOD OFF OR FALL ASLEEP WHILE WATCHING TV: 1
HOW LIKELY ARE YOU TO NOD OFF OR FALL ASLEEP WHEN YOU ARE A PASSENGER IN A CAR FOR AN HOUR WITHOUT A BREAK: 1
ESS TOTAL SCORE: 5
HOW LIKELY ARE YOU TO NOD OFF OR FALL ASLEEP WHILE LYING DOWN TO REST IN THE AFTERNOON WHEN CIRCUMSTANCES PERMIT: 2

## 2024-01-18 NOTE — PROGRESS NOTES
sleep behavior disorder  clonazePAM (KLONOPIN) 2 MG tablet      3. RLS (restless legs syndrome)  clonazePAM (KLONOPIN) 2 MG tablet        Plan:   We have a Drug contract for Clonazepam 2-4 mg at the bedtime last visit.  Will continue the same  OARRS report reviewed and is consistent with the plan of care.  The patient understands the risks of dependency and addiction with above medication. Plan to take lowest therapeutic dose possible and discontinue as soon as possible.   Yearly OARRS report, controlled substance agreement and urine tox screen.         No orders of the defined types were placed in this encounter.      Return in about 6 months (around 7/18/2024).    Martin Corona MD  Medical Director - [facility]

## 2024-01-31 ENCOUNTER — TELEPHONE (OUTPATIENT)
Dept: PULMONOLOGY | Age: 24
End: 2024-01-31

## 2024-01-31 DIAGNOSIS — G47.52 REM SLEEP BEHAVIOR DISORDER: ICD-10-CM

## 2024-01-31 DIAGNOSIS — F51.8 HYPNIC JERKS: ICD-10-CM

## 2024-01-31 DIAGNOSIS — G25.81 RLS (RESTLESS LEGS SYNDROME): ICD-10-CM

## 2024-01-31 NOTE — TELEPHONE ENCOUNTER
Jose called stating can you resend script for clonazepam it was accidentally deleted never filled or picked up.   [phone]

## 2024-02-01 DIAGNOSIS — F51.8 HYPNIC JERKS: ICD-10-CM

## 2024-02-01 DIAGNOSIS — G25.81 RLS (RESTLESS LEGS SYNDROME): ICD-10-CM

## 2024-02-01 DIAGNOSIS — G47.52 REM SLEEP BEHAVIOR DISORDER: ICD-10-CM

## 2024-02-01 RX ORDER — CLONAZEPAM 2 MG/1
TABLET ORAL
Qty: 60 TABLET | Refills: 5 | OUTPATIENT
Start: 2024-02-01 | End: 2027-02-06

## 2024-02-01 RX ORDER — CLONAZEPAM 2 MG/1
TABLET ORAL
Qty: 60 TABLET | Refills: 5 | OUTPATIENT
Start: 2024-02-01 | End: 2027-02-05

## 2024-02-01 NOTE — TELEPHONE ENCOUNTER
Patient stated his pharmacy is telling patient that they had a cliche in the system and patient did not get the prescriptions and pharmacy needs us to call them to verify this prescription is okay to give the patient.     [phone]    Tiny called and spoke to Barrington at the pharmacy and verified verbally.

## 2024-02-05 DIAGNOSIS — F51.8 HYPNIC JERKS: ICD-10-CM

## 2024-02-05 DIAGNOSIS — G25.81 RLS (RESTLESS LEGS SYNDROME): ICD-10-CM

## 2024-02-05 DIAGNOSIS — G47.52 REM SLEEP BEHAVIOR DISORDER: ICD-10-CM

## 2024-02-05 RX ORDER — CLONAZEPAM 2 MG/1
TABLET ORAL
Qty: 60 TABLET | Refills: 5 | Status: SHIPPED | OUTPATIENT
Start: 2024-02-05 | End: 2027-02-10

## 2024-03-27 ENCOUNTER — OFFICE VISIT (OUTPATIENT)
Dept: PRIMARY CARE CLINIC | Age: 24
End: 2024-03-27
Payer: COMMERCIAL

## 2024-03-27 VITALS
HEIGHT: 72 IN | DIASTOLIC BLOOD PRESSURE: 83 MMHG | BODY MASS INDEX: 29.31 KG/M2 | TEMPERATURE: 97.3 F | WEIGHT: 216.4 LBS | HEART RATE: 100 BPM | SYSTOLIC BLOOD PRESSURE: 124 MMHG

## 2024-03-27 DIAGNOSIS — F90.2 ATTENTION DEFICIT HYPERACTIVITY DISORDER, COMBINED TYPE: Primary | ICD-10-CM

## 2024-03-27 DIAGNOSIS — M79.7 FIBROMYALGIA: ICD-10-CM

## 2024-03-27 PROCEDURE — G8427 DOCREV CUR MEDS BY ELIG CLIN: HCPCS | Performed by: STUDENT IN AN ORGANIZED HEALTH CARE EDUCATION/TRAINING PROGRAM

## 2024-03-27 PROCEDURE — G8419 CALC BMI OUT NRM PARAM NOF/U: HCPCS | Performed by: STUDENT IN AN ORGANIZED HEALTH CARE EDUCATION/TRAINING PROGRAM

## 2024-03-27 PROCEDURE — 99214 OFFICE O/P EST MOD 30 MIN: CPT | Performed by: STUDENT IN AN ORGANIZED HEALTH CARE EDUCATION/TRAINING PROGRAM

## 2024-03-27 PROCEDURE — 4004F PT TOBACCO SCREEN RCVD TLK: CPT | Performed by: STUDENT IN AN ORGANIZED HEALTH CARE EDUCATION/TRAINING PROGRAM

## 2024-03-27 PROCEDURE — G8484 FLU IMMUNIZE NO ADMIN: HCPCS | Performed by: STUDENT IN AN ORGANIZED HEALTH CARE EDUCATION/TRAINING PROGRAM

## 2024-03-27 RX ORDER — DEXTROAMPHETAMINE SACCHARATE, AMPHETAMINE ASPARTATE MONOHYDRATE, DEXTROAMPHETAMINE SULFATE AND AMPHETAMINE SULFATE 5; 5; 5; 5 MG/1; MG/1; MG/1; MG/1
20 CAPSULE, EXTENDED RELEASE ORAL EVERY MORNING
Qty: 30 CAPSULE | Refills: 0 | Status: SHIPPED | OUTPATIENT
Start: 2024-03-27 | End: 2024-04-26

## 2024-03-27 RX ORDER — DULOXETIN HYDROCHLORIDE 20 MG/1
40 CAPSULE, DELAYED RELEASE ORAL DAILY
Qty: 60 CAPSULE | Refills: 5
Start: 2024-03-27

## 2024-03-27 RX ORDER — DEXTROAMPHETAMINE SACCHARATE, AMPHETAMINE ASPARTATE MONOHYDRATE, DEXTROAMPHETAMINE SULFATE AND AMPHETAMINE SULFATE 5; 5; 5; 5 MG/1; MG/1; MG/1; MG/1
20 CAPSULE, EXTENDED RELEASE ORAL EVERY MORNING
Qty: 30 CAPSULE | Refills: 0 | Status: SHIPPED | OUTPATIENT
Start: 2024-05-25 | End: 2024-06-24

## 2024-03-27 RX ORDER — DEXTROAMPHETAMINE SACCHARATE, AMPHETAMINE ASPARTATE MONOHYDRATE, DEXTROAMPHETAMINE SULFATE AND AMPHETAMINE SULFATE 5; 5; 5; 5 MG/1; MG/1; MG/1; MG/1
20 CAPSULE, EXTENDED RELEASE ORAL EVERY MORNING
Qty: 30 CAPSULE | Refills: 0 | Status: SHIPPED | OUTPATIENT
Start: 2024-04-26 | End: 2024-05-26

## 2024-03-27 SDOH — ECONOMIC STABILITY: FOOD INSECURITY: WITHIN THE PAST 12 MONTHS, YOU WORRIED THAT YOUR FOOD WOULD RUN OUT BEFORE YOU GOT MONEY TO BUY MORE.: NEVER TRUE

## 2024-03-27 SDOH — ECONOMIC STABILITY: INCOME INSECURITY: HOW HARD IS IT FOR YOU TO PAY FOR THE VERY BASICS LIKE FOOD, HOUSING, MEDICAL CARE, AND HEATING?: NOT HARD AT ALL

## 2024-03-27 SDOH — ECONOMIC STABILITY: FOOD INSECURITY: WITHIN THE PAST 12 MONTHS, THE FOOD YOU BOUGHT JUST DIDN'T LAST AND YOU DIDN'T HAVE MONEY TO GET MORE.: NEVER TRUE

## 2024-03-27 ASSESSMENT — ENCOUNTER SYMPTOMS
ABDOMINAL PAIN: 0
CHEST TIGHTNESS: 0
NAUSEA: 0
SHORTNESS OF BREATH: 0
CONSTIPATION: 0

## 2024-03-27 NOTE — PROGRESS NOTES
[facility]  3/27/2024    Jr Arroyo (:  2000) is a 23 y.o. male, here for evaluation of the following medical concerns:    Chief Complaint   Patient presents with    Annual Exam    Other     Starting a med again that he was previously on         ASSESSMENT/ PLAN  1. Attention deficit hyperactivity disorder, combined type  Uncontrolled, restart Adderall today and follow-up in 3 months for recheck.  PDMP reviewed.  - amphetamine-dextroamphetamine (ADDERALL XR) 20 MG extended release capsule; Take 1 capsule by mouth every morning for 30 days. Max Daily Amount: 20 mg  Dispense: 30 capsule; Refill: 0  - amphetamine-dextroamphetamine (ADDERALL XR) 20 MG extended release capsule; Take 1 capsule by mouth every morning for 30 days. Max Daily Amount: 20 mg  Dispense: 30 capsule; Refill: 0  - amphetamine-dextroamphetamine (ADDERALL XR) 20 MG extended release capsule; Take 1 capsule by mouth every morning for 30 days. Max Daily Amount: 20 mg  Dispense: 30 capsule; Refill: 0    2. Fibromyalgia  Controlled, continue Cymbalta daily as prescribed  - DULoxetine (CYMBALTA) 20 MG extended release capsule; Take 2 capsules by mouth daily  Dispense: 60 capsule; Refill: 5       Return in about 3 months (around 2024) for ADHD, VV.    HPI  Patient presents today to restart Adderall for his ADHD.    Notes he was previously well-controlled on 20 mg XR, but was lost to follow-up with his previous prescriber.  Also notes that he was able to manage well without the medication while working from home.  He has recently returned to the office, and notices increased inattention in that work setting.    REM sleep behavior disorder controlled, treated with Klonopin by sleep medicine.    Fibromyalgia well-controlled with Cymbalta, which she takes daily as prescribed.    ROS  Review of Systems   Constitutional:  Negative for activity change, appetite change, fatigue and unexpected weight change.   Respiratory:  Negative

## 2024-06-27 ENCOUNTER — OFFICE VISIT (OUTPATIENT)
Dept: PRIMARY CARE CLINIC | Age: 24
End: 2024-06-27
Payer: COMMERCIAL

## 2024-06-27 VITALS
SYSTOLIC BLOOD PRESSURE: 129 MMHG | TEMPERATURE: 96.7 F | WEIGHT: 213.2 LBS | HEIGHT: 72 IN | BODY MASS INDEX: 28.88 KG/M2 | HEART RATE: 99 BPM | DIASTOLIC BLOOD PRESSURE: 83 MMHG

## 2024-06-27 DIAGNOSIS — F90.2 ATTENTION DEFICIT HYPERACTIVITY DISORDER, COMBINED TYPE: ICD-10-CM

## 2024-06-27 PROCEDURE — 99214 OFFICE O/P EST MOD 30 MIN: CPT | Performed by: NURSE PRACTITIONER

## 2024-06-27 PROCEDURE — G8427 DOCREV CUR MEDS BY ELIG CLIN: HCPCS | Performed by: NURSE PRACTITIONER

## 2024-06-27 PROCEDURE — G8419 CALC BMI OUT NRM PARAM NOF/U: HCPCS | Performed by: NURSE PRACTITIONER

## 2024-06-27 PROCEDURE — 1036F TOBACCO NON-USER: CPT | Performed by: NURSE PRACTITIONER

## 2024-06-27 RX ORDER — DEXTROAMPHETAMINE SACCHARATE, AMPHETAMINE ASPARTATE MONOHYDRATE, DEXTROAMPHETAMINE SULFATE AND AMPHETAMINE SULFATE 7.5; 7.5; 7.5; 7.5 MG/1; MG/1; MG/1; MG/1
30 CAPSULE, EXTENDED RELEASE ORAL DAILY
Qty: 30 CAPSULE | Refills: 0 | Status: SHIPPED | OUTPATIENT
Start: 2024-07-28 | End: 2024-08-27

## 2024-06-27 RX ORDER — DEXTROAMPHETAMINE SACCHARATE, AMPHETAMINE ASPARTATE MONOHYDRATE, DEXTROAMPHETAMINE SULFATE AND AMPHETAMINE SULFATE 7.5; 7.5; 7.5; 7.5 MG/1; MG/1; MG/1; MG/1
30 CAPSULE, EXTENDED RELEASE ORAL DAILY
Qty: 30 CAPSULE | Refills: 0 | Status: SHIPPED | OUTPATIENT
Start: 2024-08-28 | End: 2024-09-27

## 2024-06-27 RX ORDER — DEXTROAMPHETAMINE SACCHARATE, AMPHETAMINE ASPARTATE MONOHYDRATE, DEXTROAMPHETAMINE SULFATE AND AMPHETAMINE SULFATE 7.5; 7.5; 7.5; 7.5 MG/1; MG/1; MG/1; MG/1
30 CAPSULE, EXTENDED RELEASE ORAL DAILY
Qty: 30 CAPSULE | Refills: 0 | Status: SHIPPED | OUTPATIENT
Start: 2024-06-27 | End: 2024-07-27

## 2024-06-27 NOTE — PROGRESS NOTES
Jr Arroyo (: 2000) is a 23 y.o. male is here for evaluation of the following chief complaint(s): ADHD    Assessment/Plan:   1. Attention deficit hyperactivity disorder, combined type  -     amphetamine-dextroamphetamine (ADDERALL XR) 30 MG extended release capsule; Take 1 capsule by mouth daily for 30 days. Max Daily Amount: 30 mg, Disp-30 capsule, R-0Print  -     amphetamine-dextroamphetamine (ADDERALL XR) 30 MG extended release capsule; Take 1 capsule by mouth daily for 30 days. Max Daily Amount: 30 mg, Disp-30 capsule, R-0Print  -     amphetamine-dextroamphetamine (ADDERALL XR) 30 MG extended release capsule; Take 1 capsule by mouth daily for 30 days. Max Daily Amount: 30 mg, Disp-30 capsule, R-0Print  - Uncontrolled    Return in 3 months (on 2024) for Can be virtual.  Subjective/Objective:   Since last visit: Says medication is helping a little but feels he needs an increase. Says he was previously taking a higher dose which he discussed with Dr. Yousif at last appointment.  Medication compliance: all of the time.  Side effects from medication include: none.   has been reviewed.     A comprehensive review of systems was negative.      Exam:  General: alert, appears stated age, and cooperative  Heart exam: normal rate, regular rhythm, normal S1, S2, no murmurs, rubs, clicks or gallops  Lung exam: clear to auscultation bilaterally  Neuro: no gross deficits  Mental Status exam: normal mood, behavior, and thought processes, able to follow commands   /83   Pulse 99   Temp (!) 96.7 °F (35.9 °C) (Temporal)   Ht 1.829 m (6')   Wt 96.7 kg (213 lb 3.2 oz)   BMI 28.92 kg/m²       An electronic signature was used to authenticate this note.  -- JOZEF Medeiros

## 2024-07-11 DIAGNOSIS — M79.7 FIBROMYALGIA: ICD-10-CM

## 2024-07-11 RX ORDER — DULOXETIN HYDROCHLORIDE 20 MG/1
40 CAPSULE, DELAYED RELEASE ORAL DAILY
Qty: 60 CAPSULE | Refills: 5 | Status: SHIPPED | OUTPATIENT
Start: 2024-07-11

## 2024-07-11 NOTE — TELEPHONE ENCOUNTER
Medication:   Requested Prescriptions     Pending Prescriptions Disp Refills    DULoxetine (CYMBALTA) 20 MG extended release capsule [Pharmacy Med Name: DULOXETINE DR 20MG CAPSULES] 60 capsule 5     Sig: TAKE 2 CAPSULES BY MOUTH DAILY        Last Filled:  03/27/24    Patient Phone Number: [phone] (home) [phone] (work)    Last appt: 6/27/2024   Next appt: 9/30/2024    Last OARRS:        No data to display

## 2024-07-18 ENCOUNTER — OFFICE VISIT (OUTPATIENT)
Dept: SLEEP MEDICINE | Age: 24
End: 2024-07-18

## 2024-07-18 VITALS
RESPIRATION RATE: 18 BRPM | TEMPERATURE: 97.9 F | WEIGHT: 212.2 LBS | SYSTOLIC BLOOD PRESSURE: 115 MMHG | DIASTOLIC BLOOD PRESSURE: 70 MMHG | HEIGHT: 72 IN | BODY MASS INDEX: 28.74 KG/M2 | OXYGEN SATURATION: 100 % | HEART RATE: 85 BPM

## 2024-07-18 DIAGNOSIS — G47.52 REM SLEEP BEHAVIOR DISORDER: ICD-10-CM

## 2024-07-18 DIAGNOSIS — F51.8 HYPNIC JERKS: ICD-10-CM

## 2024-07-18 DIAGNOSIS — G25.81 RLS (RESTLESS LEGS SYNDROME): ICD-10-CM

## 2024-07-18 RX ORDER — CLONAZEPAM 2 MG/1
TABLET ORAL
Qty: 60 TABLET | Refills: 5 | Status: SHIPPED | OUTPATIENT
Start: 2024-07-18 | End: 2027-07-24

## 2024-07-18 ASSESSMENT — SLEEP AND FATIGUE QUESTIONNAIRES
ESS TOTAL SCORE: 5
HOW LIKELY ARE YOU TO NOD OFF OR FALL ASLEEP WHILE SITTING QUIETLY AFTER LUNCH WITHOUT ALCOHOL: WOULD NEVER DOZE
HOW LIKELY ARE YOU TO NOD OFF OR FALL ASLEEP WHEN YOU ARE A PASSENGER IN A CAR FOR AN HOUR WITHOUT A BREAK: SLIGHT CHANCE OF DOZING
HOW LIKELY ARE YOU TO NOD OFF OR FALL ASLEEP WHILE LYING DOWN TO REST IN THE AFTERNOON WHEN CIRCUMSTANCES PERMIT: MODERATE CHANCE OF DOZING
HOW LIKELY ARE YOU TO NOD OFF OR FALL ASLEEP WHILE SITTING AND READING: WOULD NEVER DOZE
HOW LIKELY ARE YOU TO NOD OFF OR FALL ASLEEP WHILE WATCHING TV: SLIGHT CHANCE OF DOZING
HOW LIKELY ARE YOU TO NOD OFF OR FALL ASLEEP IN A CAR, WHILE STOPPED FOR A FEW MINUTES IN TRAFFIC: WOULD NEVER DOZE
HOW LIKELY ARE YOU TO NOD OFF OR FALL ASLEEP WHILE SITTING INACTIVE IN A PUBLIC PLACE: SLIGHT CHANCE OF DOZING
HOW LIKELY ARE YOU TO NOD OFF OR FALL ASLEEP WHILE SITTING AND TALKING TO SOMEONE: WOULD NEVER DOZE

## 2024-07-18 NOTE — PROGRESS NOTES
as of 07/18/2024    (No Known Allergies)       Patient Active Problem List   Diagnosis    Attention deficit hyperactivity disorder, combined type    REM sleep behavior disorder    Anxiety    Functional neurological symptom disorder with abnormal movement - eval'd by Neuro       Past Medical History:   Diagnosis Date    Anxiety 02/11/2021    Attention deficit hyperactivity disorder, combined type 06/09/2010    Atypical tic disorder     2/2 adderall:  seen at  Neurologic Functional Disorder    COPD (chronic obstructive pulmonary disease) ([facility])     Hypnic jerks 08/31/2020    REM sleep behavior disorder     Seizure disorder ([facility])     Sleep apnea        Past Surgical History:   Procedure Laterality Date    NASAL SEPTUM SURGERY         Family History   Problem Relation Age of Onset    Alcohol Abuse Mother         Chronic Alcohol Abuse    Asthma Mother     Depression Mother     Diabetes Father     High Blood Pressure Father     Sleep Apnea Father     Alcohol Abuse Father         Chronic Alcohol Abuse    Depression Father     High Cholesterol Father     Alcohol Abuse Paternal Grandmother        Review of Systems    Objective:     Vitals:  Weight BMI Neck circumference    Wt Readings from Last 3 Encounters:   07/18/24 96.3 kg (212 lb 3.2 oz)   06/27/24 96.7 kg (213 lb 3.2 oz)   03/27/24 98.2 kg (216 lb 6.4 oz)    Body mass index is 28.78 kg/m².       BP HR SaO2   BP Readings from Last 3 Encounters:   07/18/24 115/70   06/27/24 129/83   03/27/24 124/83    Pulse Readings from Last 3 Encounters:   07/18/24 85   06/27/24 99   03/27/24 100    SpO2 Readings from Last 3 Encounters:   07/18/24 100%   01/18/24 99%   09/29/23 98%        Themandibular molar Class :   [x]1 []2 []3      Mallampati I Airway Classification:   [x]1 []2 []3 []4      Physical Exam  Vitals and nursing note reviewed.   Constitutional:       Appearance: Normal appearance.   Cardiovascular:      Rate and Rhythm: Normal rate and regular rhythm.   Pulmonary:

## 2024-09-30 ENCOUNTER — TELEMEDICINE (OUTPATIENT)
Dept: PRIMARY CARE CLINIC | Age: 24
End: 2024-09-30
Payer: COMMERCIAL

## 2024-09-30 DIAGNOSIS — F90.2 ATTENTION DEFICIT HYPERACTIVITY DISORDER, COMBINED TYPE: ICD-10-CM

## 2024-09-30 PROCEDURE — 1036F TOBACCO NON-USER: CPT | Performed by: STUDENT IN AN ORGANIZED HEALTH CARE EDUCATION/TRAINING PROGRAM

## 2024-09-30 PROCEDURE — G8428 CUR MEDS NOT DOCUMENT: HCPCS | Performed by: STUDENT IN AN ORGANIZED HEALTH CARE EDUCATION/TRAINING PROGRAM

## 2024-09-30 PROCEDURE — G8419 CALC BMI OUT NRM PARAM NOF/U: HCPCS | Performed by: STUDENT IN AN ORGANIZED HEALTH CARE EDUCATION/TRAINING PROGRAM

## 2024-09-30 PROCEDURE — G2211 COMPLEX E/M VISIT ADD ON: HCPCS | Performed by: STUDENT IN AN ORGANIZED HEALTH CARE EDUCATION/TRAINING PROGRAM

## 2024-09-30 PROCEDURE — 99214 OFFICE O/P EST MOD 30 MIN: CPT | Performed by: STUDENT IN AN ORGANIZED HEALTH CARE EDUCATION/TRAINING PROGRAM

## 2024-09-30 RX ORDER — DEXTROAMPHETAMINE SACCHARATE, AMPHETAMINE ASPARTATE MONOHYDRATE, DEXTROAMPHETAMINE SULFATE AND AMPHETAMINE SULFATE 7.5; 7.5; 7.5; 7.5 MG/1; MG/1; MG/1; MG/1
30 CAPSULE, EXTENDED RELEASE ORAL DAILY
Qty: 30 CAPSULE | Refills: 0 | Status: SHIPPED | OUTPATIENT
Start: 2024-09-30 | End: 2024-10-30

## 2024-09-30 RX ORDER — DEXTROAMPHETAMINE SACCHARATE, AMPHETAMINE ASPARTATE, DEXTROAMPHETAMINE SULFATE AND AMPHETAMINE SULFATE 2.5; 2.5; 2.5; 2.5 MG/1; MG/1; MG/1; MG/1
10 TABLET ORAL DAILY
Qty: 30 TABLET | Refills: 0 | Status: SHIPPED | OUTPATIENT
Start: 2024-09-30 | End: 2024-09-30 | Stop reason: SDUPTHER

## 2024-09-30 RX ORDER — DEXTROAMPHETAMINE SACCHARATE, AMPHETAMINE ASPARTATE, DEXTROAMPHETAMINE SULFATE AND AMPHETAMINE SULFATE 2.5; 2.5; 2.5; 2.5 MG/1; MG/1; MG/1; MG/1
10 TABLET ORAL DAILY
Qty: 30 TABLET | Refills: 0 | Status: SHIPPED | OUTPATIENT
Start: 2024-09-30 | End: 2024-10-30

## 2024-09-30 RX ORDER — DEXTROAMPHETAMINE SACCHARATE, AMPHETAMINE ASPARTATE MONOHYDRATE, DEXTROAMPHETAMINE SULFATE AND AMPHETAMINE SULFATE 7.5; 7.5; 7.5; 7.5 MG/1; MG/1; MG/1; MG/1
30 CAPSULE, EXTENDED RELEASE ORAL DAILY
Qty: 30 CAPSULE | Refills: 0 | Status: SHIPPED | OUTPATIENT
Start: 2024-09-30 | End: 2024-09-30 | Stop reason: SDUPTHER

## 2024-09-30 ASSESSMENT — ENCOUNTER SYMPTOMS
SHORTNESS OF BREATH: 0
ABDOMINAL PAIN: 0
CHEST TIGHTNESS: 0
CONSTIPATION: 0
NAUSEA: 0

## 2024-09-30 NOTE — PROGRESS NOTES
No Facial Asymmetry (Cranial nerve 7 motor function) (limited exam due to video visit)          [x] No gaze palsy        [] Abnormal -          Skin:        [x] No significant exanthematous lesions or discoloration noted on facial skin         [] Abnormal -            Psychiatric:       [x] Normal Affect [] Abnormal -        [x] No Hallucinations    Other pertinent observable physical exam findings:-      Jr Arroyo, was evaluated through a synchronous (real-time) audio-video encounter. The patient (or guardian if applicable) is aware that this is a billable service, which includes applicable co-pays. This Virtual Visit was conducted with patient's (and/or legal guardian's) consent. Patient identification was verified, and a caregiver was present when appropriate.   The patient was located at Home: [address] [place], [place] [id number]  Provider was located at Facility (Appt Dept): [address] [place] [address]  [address] [place] [id number]  Confirm you are appropriately licensed, registered, or certified to deliver care in the Critical access hospital where the patient is located as indicated above. If you are not or unsure, please re-schedule the visit: Yes, I confirm.        --Madeline Yousif DO on 9/30/2024 at 12:17 PM    An electronic signature was used to authenticate this note.     Madeline Yousif DO    This dictation was generated by voice recognition computer software.  Although all attempts are made to edit the dictation for accuracy, there may be errors in the transcription that are not intended.

## 2024-09-30 NOTE — PROGRESS NOTES
Medication:   Requested Prescriptions     Pending Prescriptions Disp Refills    amphetamine-dextroamphetamine (ADDERALL XR) 30 MG extended release capsule 30 capsule 0     Sig: Take 1 capsule by mouth daily for 30 days. Max Daily Amount: 30 mg        Last Filled:  needs to go to different pharm    Patient Phone Number: [phone] (home) [phone] (work)    Last appt: 9/30/2024   Next appt: Visit date not found    Last OARRS:        No data to display

## 2024-10-29 DIAGNOSIS — F90.2 ATTENTION DEFICIT HYPERACTIVITY DISORDER, COMBINED TYPE: ICD-10-CM

## 2024-10-29 RX ORDER — DEXTROAMPHETAMINE SACCHARATE, AMPHETAMINE ASPARTATE, DEXTROAMPHETAMINE SULFATE AND AMPHETAMINE SULFATE 2.5; 2.5; 2.5; 2.5 MG/1; MG/1; MG/1; MG/1
10 TABLET ORAL DAILY
Qty: 30 TABLET | Refills: 0 | Status: SHIPPED | OUTPATIENT
Start: 2024-10-29 | End: 2024-11-28

## 2024-10-29 RX ORDER — DEXTROAMPHETAMINE SACCHARATE, AMPHETAMINE ASPARTATE MONOHYDRATE, DEXTROAMPHETAMINE SULFATE AND AMPHETAMINE SULFATE 7.5; 7.5; 7.5; 7.5 MG/1; MG/1; MG/1; MG/1
30 CAPSULE, EXTENDED RELEASE ORAL DAILY
Qty: 30 CAPSULE | Refills: 0 | Status: SHIPPED | OUTPATIENT
Start: 2024-10-29 | End: 2024-11-28

## 2024-10-29 NOTE — TELEPHONE ENCOUNTER
Medication:   Requested Prescriptions     Pending Prescriptions Disp Refills    amphetamine-dextroamphetamine (ADDERALL XR) 30 MG extended release capsule 30 capsule 0     Sig: Take 1 capsule by mouth daily for 30 days. Max Daily Amount: 30 mg    amphetamine-dextroamphetamine (ADDERALL, 10MG,) 10 MG tablet 30 tablet 0     Sig: Take 1 tablet by mouth daily for 30 days. Max Daily Amount: 10 mg        Last Filled:  9/30/2024    Patient Phone Number: [phone] (home) [phone] (work)    Last appt: 9/30/2024   Next appt: Visit date not found    Last OARRS:        No data to display

## 2024-11-07 ENCOUNTER — OFFICE VISIT (OUTPATIENT)
Dept: PRIMARY CARE CLINIC | Age: 24
End: 2024-11-07

## 2024-11-07 VITALS
TEMPERATURE: 98 F | HEART RATE: 83 BPM | BODY MASS INDEX: 28.88 KG/M2 | DIASTOLIC BLOOD PRESSURE: 79 MMHG | SYSTOLIC BLOOD PRESSURE: 118 MMHG | HEIGHT: 72 IN | WEIGHT: 213.2 LBS

## 2024-11-07 DIAGNOSIS — H65.05 RECURRENT ACUTE SEROUS OTITIS MEDIA OF LEFT EAR: Primary | ICD-10-CM

## 2024-11-07 DIAGNOSIS — F90.2 ATTENTION DEFICIT HYPERACTIVITY DISORDER, COMBINED TYPE: ICD-10-CM

## 2024-11-07 RX ORDER — FLUTICASONE PROPIONATE 50 MCG
2 SPRAY, SUSPENSION (ML) NASAL DAILY
Qty: 48 G | Refills: 1 | Status: SHIPPED | OUTPATIENT
Start: 2024-11-07

## 2024-11-07 RX ORDER — DEXTROAMPHETAMINE SACCHARATE, AMPHETAMINE ASPARTATE, DEXTROAMPHETAMINE SULFATE AND AMPHETAMINE SULFATE 5; 5; 5; 5 MG/1; MG/1; MG/1; MG/1
20 TABLET ORAL DAILY
Qty: 30 TABLET | Refills: 0 | Status: SHIPPED | OUTPATIENT
Start: 2024-11-07 | End: 2024-12-07

## 2024-11-07 ASSESSMENT — ENCOUNTER SYMPTOMS
CONSTIPATION: 0
SHORTNESS OF BREATH: 0
NAUSEA: 0
ABDOMINAL PAIN: 0
CHEST TIGHTNESS: 0

## 2024-11-07 NOTE — PROGRESS NOTES
[facility]  2024    Jr Arroyo (:  2000) is a 24 y.o. male, here for evaluation of the following medical concerns:    Chief Complaint   Patient presents with    Otitis Media     Left         ASSESSMENT/ PLAN  1. Recurrent acute serous otitis media of left ear  Uncontrolled, this is a recurrent problem.  Initiate antibiotic therapy.  Start Flonase daily and continue Zyrtec.  Referral placed to ENT for recurrent AOM and concurrent deviated septum.  - amoxicillin-clavulanate (AUGMENTIN) 875-125 MG per tablet; Take 1 tablet by mouth 2 times daily for 7 days  Dispense: 14 tablet; Refill: 0  - Sony Andrews MD, Otolaryngology, [facility]  - fluticasone (FLONASE) 50 MCG/ACT nasal spray; 2 sprays by Each Nostril route daily  Dispense: 48 g; Refill: 1    2. Attention deficit hyperactivity disorder, combined type  Uncontrolled, continue 30 mg XR in the mornings and increase afternoon dose to 20 mg.  PDMP reviewed.  - amphetamine-dextroamphetamine (ADDERALL, 20MG,) 20 MG tablet; Take 1 tablet by mouth daily for 30 days. Max Daily Amount: 20 mg  Dispense: 30 tablet; Refill: 0       Return if symptoms worsen or fail to improve.    HPI  Patient presents today for concerns of left ear pain, ADHD.    Notes that he was diagnosed with left AOM in February of this year.  He has recurrent pain in that ear, and was concerned about another ear infection.  No fever, chills, discharge, tinnitus or decreased hearing.  He does note history of deviated nasal septum.  He underwent sinus surgery, but did not have a noticeable change afterwards.  He uses Zyrtec daily for allergy symptoms currently.    Since initiating 10 mg IR dose in the afternoon, no appreciable difference in attention and focus which wanes at that time.  He denies concerns with insomnia, decreased appetite or increased anxiety.  He continues to take his 30 mg XR dose in the morning.    ROS  Review of Systems   Constitutional:

## 2024-11-27 ENCOUNTER — OFFICE VISIT (OUTPATIENT)
Dept: ENT CLINIC | Age: 24
End: 2024-11-27

## 2024-11-27 VITALS
WEIGHT: 216 LBS | OXYGEN SATURATION: 98 % | SYSTOLIC BLOOD PRESSURE: 118 MMHG | BODY MASS INDEX: 29.26 KG/M2 | HEIGHT: 72 IN | TEMPERATURE: 97.6 F | DIASTOLIC BLOOD PRESSURE: 77 MMHG | HEART RATE: 89 BPM

## 2024-11-27 DIAGNOSIS — H69.93 DYSFUNCTION OF BOTH EUSTACHIAN TUBES: ICD-10-CM

## 2024-11-27 DIAGNOSIS — J34.3 HYPERTROPHY OF BOTH INFERIOR NASAL TURBINATES: ICD-10-CM

## 2024-11-27 DIAGNOSIS — J34.89 NASAL OBSTRUCTION: ICD-10-CM

## 2024-11-27 DIAGNOSIS — H65.196 OTHER RECURRENT ACUTE NONSUPPURATIVE OTITIS MEDIA OF BOTH EARS: Primary | ICD-10-CM

## 2024-11-27 DIAGNOSIS — H93.8X3 SENSATION OF FULLNESS IN BOTH EARS: ICD-10-CM

## 2024-11-27 DIAGNOSIS — J34.2 DEVIATED NASAL SEPTUM: ICD-10-CM

## 2024-11-27 RX ORDER — FLUTICASONE PROPIONATE 50 MCG
2 SPRAY, SUSPENSION (ML) NASAL 2 TIMES DAILY
Qty: 16 G | Refills: 5 | Status: SHIPPED | OUTPATIENT
Start: 2024-11-27

## 2024-11-27 NOTE — PROGRESS NOTES
[facility]  DIVISION OF OTOLARYNGOLOGY- HEAD & NECK SURGERY  CONSULT      Jr Arroyo (:  2000) is a 24 y.o. male, here for evaluation of the following chief complaint(s):  New Patient (Pt stated that his pcp told him to get his ears looked at by an ent because he has had two ear infections in the past year. Pt stated that he feels like he has a deviated septum and would like to see what his options would be for fix that )      ASSESSMENT/PLAN:  1. Other recurrent acute nonsuppurative otitis media of both ears  2. Dysfunction of both eustachian tubes  3. Sensation of fullness in both ears  4. Deviated nasal septum  5. Nasal obstruction  6. Hypertrophy of both inferior nasal turbinates      This is a very pleasant 24 y.o. male here today for evaluation of the the above-noted complaints.      -Begin saline irrigations  -Increase fluticasone to 2 sprays twice daily  -Discussed with the patient that if this does not improve his nasal obstruction, he could undergo revision septoplasty, turbinate reduction and possible  grafting.  -No evidence of fluid on exam today.  It sounds like he may be suffering from recurrent acute otitis media.  I think he is exacerbating this by purposely creating negative pressure by sniffing him.  He does have evidence of retraction of pars flaccida bilaterally.    -I have asked him to try to stop purposely creating negative pressure in his ears.  If he develops another ear infection, I have asked him to call our office and I will work him in that week to see if he actually has fluid or just has symptoms consistent with eustachian tube dysfunction which can present as fluid.    Medical Decision Making:  The following items were considered in medical decision making:  Independent review of images  Review / order clinical lab tests  Review / order radiology tests  Decision to obtain old records  Review and summation of old records as accessed through [facility] if

## 2024-12-02 RX ORDER — FLUTICASONE PROPIONATE 50 MCG
SPRAY, SUSPENSION (ML) NASAL
Qty: 48 G | Refills: 1 | Status: SHIPPED | OUTPATIENT
Start: 2024-12-02 | End: 2025-03-02

## 2024-12-02 NOTE — TELEPHONE ENCOUNTER
Refill Request:     Last Office Visit:  11/27/2024     Next Scheduled Visit : Visit date not found     Medication Requested:   Requested Prescriptions     Pending Prescriptions Disp Refills    fluticasone (FLONASE) 50 MCG/ACT nasal spray [Pharmacy Med Name: FLUTICASONE 50MCG NASAL SP (120) RX] 48 g      Sig: PLACE 2 SPRAYS IN EACH NOSTRIL TWICE DAILY        Pharmacy:    [facility] - [place], [place] - [address] - P [phone] - F [phone]  [address]  [address]  [facility] [address]  Phone: [phone] Fax: [phone]    [facility][id number] - [place], [place] - [address] - P [phone] - F [phone]  [address]  [facility] [address]  Phone: [phone] Fax: [phone]    [facility] [id number] [place], [place] - [address] - P [phone] - F [phone] [address]  [facility] [address]  Phone: [phone] Fax: [phone]

## 2024-12-03 DIAGNOSIS — F90.2 ATTENTION DEFICIT HYPERACTIVITY DISORDER, COMBINED TYPE: ICD-10-CM

## 2024-12-04 RX ORDER — DEXTROAMPHETAMINE SACCHARATE, AMPHETAMINE ASPARTATE MONOHYDRATE, DEXTROAMPHETAMINE SULFATE AND AMPHETAMINE SULFATE 7.5; 7.5; 7.5; 7.5 MG/1; MG/1; MG/1; MG/1
30 CAPSULE, EXTENDED RELEASE ORAL DAILY
Qty: 30 CAPSULE | Refills: 0 | Status: SHIPPED | OUTPATIENT
Start: 2024-12-04 | End: 2025-01-03

## 2024-12-04 NOTE — TELEPHONE ENCOUNTER
Medication:   Requested Prescriptions     Pending Prescriptions Disp Refills    amphetamine-dextroamphetamine (ADDERALL XR) 30 MG extended release capsule 30 capsule 0     Sig: Take 1 capsule by mouth daily for 30 days. Max Daily Amount: 30 mg        Last Filled:  10/29/24    Patient Phone Number: [phone] (home) [phone] (work)    Last appt: 11/7/2024   Next appt: Visit date not found    Last OARRS:        No data to display

## 2024-12-19 DIAGNOSIS — F90.2 ATTENTION DEFICIT HYPERACTIVITY DISORDER, COMBINED TYPE: ICD-10-CM

## 2024-12-19 RX ORDER — DEXTROAMPHETAMINE SACCHARATE, AMPHETAMINE ASPARTATE, DEXTROAMPHETAMINE SULFATE AND AMPHETAMINE SULFATE 5; 5; 5; 5 MG/1; MG/1; MG/1; MG/1
20 TABLET ORAL DAILY
Qty: 30 TABLET | Refills: 0 | Status: SHIPPED | OUTPATIENT
Start: 2024-12-19 | End: 2025-01-18

## 2024-12-19 NOTE — TELEPHONE ENCOUNTER
Medication:   Requested Prescriptions     Pending Prescriptions Disp Refills    amphetamine-dextroamphetamine (ADDERALL, 20MG,) 20 MG tablet 30 tablet 0     Sig: Take 1 tablet by mouth daily for 30 days. Max Daily Amount: 20 mg        Last Filled:  11/07/24    Patient Phone Number: [phone] (home) [phone] (work)    Last appt: 11/7/2024   Next appt: Visit date not found        Return if symptoms worsen or fail to improve.

## 2024-12-30 DIAGNOSIS — F90.2 ATTENTION DEFICIT HYPERACTIVITY DISORDER, COMBINED TYPE: ICD-10-CM

## 2024-12-31 RX ORDER — DEXTROAMPHETAMINE SACCHARATE, AMPHETAMINE ASPARTATE MONOHYDRATE, DEXTROAMPHETAMINE SULFATE AND AMPHETAMINE SULFATE 7.5; 7.5; 7.5; 7.5 MG/1; MG/1; MG/1; MG/1
30 CAPSULE, EXTENDED RELEASE ORAL DAILY
Qty: 30 CAPSULE | Refills: 0 | Status: SHIPPED | OUTPATIENT
Start: 2024-12-31 | End: 2025-01-30

## 2024-12-31 NOTE — TELEPHONE ENCOUNTER
Medication:   Requested Prescriptions     Pending Prescriptions Disp Refills    amphetamine-dextroamphetamine (ADDERALL XR) 30 MG extended release capsule 30 capsule 0     Sig: Take 1 capsule by mouth daily for 30 days. Max Daily Amount: 30 mg        Last Filled:  12/04/24    Patient Phone Number: [phone] (home) [phone] (work)    Last appt: 11/7/2024   Next appt: Visit date not found    Last OARRS:        No data to display

## 2025-01-02 RX ORDER — FLUTICASONE PROPIONATE 50 MCG
1 SPRAY, SUSPENSION (ML) NASAL DAILY
Qty: 1 EACH | Refills: 3 | Status: SHIPPED | OUTPATIENT
Start: 2025-01-02 | End: 2025-04-02

## 2025-01-18 DIAGNOSIS — F90.2 ATTENTION DEFICIT HYPERACTIVITY DISORDER, COMBINED TYPE: ICD-10-CM

## 2025-01-20 RX ORDER — DEXTROAMPHETAMINE SACCHARATE, AMPHETAMINE ASPARTATE, DEXTROAMPHETAMINE SULFATE AND AMPHETAMINE SULFATE 5; 5; 5; 5 MG/1; MG/1; MG/1; MG/1
20 TABLET ORAL DAILY
Qty: 30 TABLET | Refills: 0 | Status: SHIPPED | OUTPATIENT
Start: 2025-01-20 | End: 2025-02-19

## 2025-01-20 NOTE — TELEPHONE ENCOUNTER
Medication:   Requested Prescriptions     Pending Prescriptions Disp Refills    amphetamine-dextroamphetamine (ADDERALL, 20MG,) 20 MG tablet 30 tablet 0     Sig: Take 1 tablet by mouth daily for 30 days. Max Daily Amount: 20 mg        Last Filled:  12/19/24    Patient Phone Number: [phone] (home) [phone] (work)    Last appt: 11/7/2024 Return if symptoms worsen or fail to improve  9/30/24- Return in about 6 months (around 3/30/2025) for annual physical, ADHD.   Next appt: Visit date not found    Last OARRS:        No data to display

## 2025-01-23 ENCOUNTER — OFFICE VISIT (OUTPATIENT)
Dept: SLEEP MEDICINE | Age: 25
End: 2025-01-23

## 2025-01-23 VITALS
TEMPERATURE: 97.9 F | RESPIRATION RATE: 18 BRPM | HEIGHT: 72 IN | BODY MASS INDEX: 30.88 KG/M2 | HEART RATE: 97 BPM | DIASTOLIC BLOOD PRESSURE: 70 MMHG | OXYGEN SATURATION: 98 % | WEIGHT: 228 LBS | SYSTOLIC BLOOD PRESSURE: 115 MMHG

## 2025-01-23 DIAGNOSIS — G47.33 OSA (OBSTRUCTIVE SLEEP APNEA): ICD-10-CM

## 2025-01-23 DIAGNOSIS — R06.83 SNORING: ICD-10-CM

## 2025-01-23 DIAGNOSIS — R06.89 GASPING FOR BREATH: ICD-10-CM

## 2025-01-23 DIAGNOSIS — G25.81 RLS (RESTLESS LEGS SYNDROME): ICD-10-CM

## 2025-01-23 DIAGNOSIS — F51.8 HYPNIC JERKS: ICD-10-CM

## 2025-01-23 DIAGNOSIS — G47.52 REM SLEEP BEHAVIOR DISORDER: Primary | ICD-10-CM

## 2025-01-23 RX ORDER — CLONAZEPAM 2 MG/1
TABLET ORAL
Qty: 60 TABLET | Refills: 5 | Status: SHIPPED | OUTPATIENT
Start: 2025-01-23 | End: 2028-01-29

## 2025-01-23 ASSESSMENT — SLEEP AND FATIGUE QUESTIONNAIRES
HOW LIKELY ARE YOU TO NOD OFF OR FALL ASLEEP WHILE WATCHING TV: MODERATE CHANCE OF DOZING
HOW LIKELY ARE YOU TO NOD OFF OR FALL ASLEEP WHILE SITTING AND READING: SLIGHT CHANCE OF DOZING
HOW LIKELY ARE YOU TO NOD OFF OR FALL ASLEEP WHILE SITTING QUIETLY AFTER LUNCH WITHOUT ALCOHOL: WOULD NEVER DOZE
HOW LIKELY ARE YOU TO NOD OFF OR FALL ASLEEP WHILE SITTING AND TALKING TO SOMEONE: WOULD NEVER DOZE
HOW LIKELY ARE YOU TO NOD OFF OR FALL ASLEEP WHILE SITTING INACTIVE IN A PUBLIC PLACE: WOULD NEVER DOZE
HOW LIKELY ARE YOU TO NOD OFF OR FALL ASLEEP IN A CAR, WHILE STOPPED FOR A FEW MINUTES IN TRAFFIC: WOULD NEVER DOZE
HOW LIKELY ARE YOU TO NOD OFF OR FALL ASLEEP WHILE LYING DOWN TO REST IN THE AFTERNOON WHEN CIRCUMSTANCES PERMIT: HIGH CHANCE OF DOZING
HOW LIKELY ARE YOU TO NOD OFF OR FALL ASLEEP WHEN YOU ARE A PASSENGER IN A CAR FOR AN HOUR WITHOUT A BREAK: SLIGHT CHANCE OF DOZING

## 2025-01-23 NOTE — PATIENT INSTRUCTIONS
Orders Placed This Encounter   Procedures    Home Sleep Study     Standing Status:   Future     Standing Expiration Date:   1/23/2026     Order Specific Question:   Location For Sleep Study     Answer:   [facility]     Order Specific Question:   Select Sleep Lab Location     Answer:   [facility]

## 2025-01-23 NOTE — PROGRESS NOTES
MD RYDER Corona Board Certified in Sleep Medicine  Certified in Behavioral Sleep Medicine  Board Certified in Neurology [facility]  [address]   [address]  [place]  [address]  [phone]   [facility]  [address]  [address]  [place], [place] [address]                      [facility] [place] [facility]  [address] [address] [phone]    Subjective:     Patient ID: Jr Arroyo is a 24 y.o. male.    Chief Complaint   Patient presents with    Follow-up       HPI:        Jr Arroyo is a 24 y.o. male was seen today as a follow for REM sleep behavior disorder and RLS.   No much hypnic jerk or moving during the REM with Klonopin 4 mg, very mild jerking on his muscles nut not extensive. .  most of the night 3  mg. but when he has hypnic jerks he takes 4 mg,no side effects.   Off the Melatonin 10 mg for 2 months  since he has no problem falling in sleep.  Patient improved regarding daytime sleepiness and fatigue, wakes up refreshed in the morning.  On Adderall for ADHD.  Sometimes he wakes up without of breath, he does snore. Since we started the Klonopin.  Uses retainer.  DOT/CDL - N/A        Previous Report(s)Reviewed: historical medical records         Social History     Socioeconomic History    Marital status: Single     Spouse name: Not on file    Number of children: Not on file    Years of education: Not on file    Highest education level: Not on file   Occupational History    Occupation: [employer]   Tobacco Use    Smoking status: Former     Current packs/day: 0.00     Average packs/day: 1 pack/day for 3.5 years (3.5 ttl pk-yrs)     Types: E-Cigarettes, Cigarettes     Start date: 2021     Quit date: 2024     Years since quittin.5     Passive exposure: Past    Smokeless tobacco: Never   Vaping Use    Vaping status: Former

## 2025-01-27 ENCOUNTER — HOSPITAL ENCOUNTER (OUTPATIENT)
Dept: SLEEP CENTER | Age: 25
Discharge: HOME | End: 2025-01-27
Attending: PSYCHIATRY & NEUROLOGY
Payer: COMMERCIAL

## 2025-01-27 DIAGNOSIS — R06.89 GASPING FOR BREATH: ICD-10-CM

## 2025-01-27 DIAGNOSIS — R06.83 SNORING: ICD-10-CM

## 2025-01-27 DIAGNOSIS — G47.33 OSA (OBSTRUCTIVE SLEEP APNEA): ICD-10-CM

## 2025-01-27 PROCEDURE — 95806 SLEEP STUDY UNATT&RESP EFFT: CPT

## 2025-01-28 PROBLEM — G47.33 OSA (OBSTRUCTIVE SLEEP APNEA): Status: ACTIVE | Noted: 2025-01-28

## 2025-01-28 NOTE — PROGRESS NOTES
Jr Dina         : 2000  [] [facility]     [] [facility]      [] [facility]     [][facility]    [] [facility]  [] [facility]  [] [facility]   [] [facility] [] Other:  Diagnosis: [x] PARAG (G47.33) [] CSA (G47.31) [] Apnea (G47.30)   Length of Need: [] 12 Months [x] 99 Months [] Other:    Machine (DONNY!):  [x] ResMed AirSense     Auto [] Other:     [x]  CPAP () [] Bilevel ()   Mode: [x] Auto [] Spontaneous    Mode: [] Auto [] Spontaneous           Between 5 and 12 cm                 Comfort Settings:     If the order for CPAP  - Ramp Pressure: 5 cmH2O                                        - Ramp time: 15 min                                     -  Flex/EPR - 3 full time       Humidifier: [x] Heated ()        [x] Water chamber replacement ()/ 1 per 6 months        Mask:  Please always start with the mask the patient used during the titraion   [x] Nasal () /1 per 3 months [x] Full Face () /1 per 3 months   [x] Patient choice -Size and fit mask [x] Patient Choice - Size and fit mask   [] Dispense:  [] Dispense:    [x] Headgear () / 1 per 6 months [x] Headgear () / 1 per 3 months   [x] Replacement Nasal Cushion ()/2 per month [x] Interface Replacement ()/1 per month   [x] Replacement Nasal Pillows ()/2 per month         Tubing: [x] Heated ()/1 per 3 months    [] Standard ()/1 per 3 months [] Other:           Filters: [x] Non-disposable ()/1 per 6 months     [x] Ultra-Fine, Disposable ()/2 per month        Miscellaneous: [x] Chin Strap ()/ 1 per 6 months [] O2 bleed-in:       LPM   [] Oximetry on CPAP/Bilevel []  Other:          Start Order Date: 25    MEDICAL JUSTIFICATION:  I, the undersigned, certify that the above prescribed supplies are medically necessary for this patient’s wellbeing.  In my opinion, the supplies are both reasonable and necessary in reference to accepted standards of medicalpractice in

## 2025-01-29 ENCOUNTER — TELEPHONE (OUTPATIENT)
Dept: PULMONOLOGY | Age: 25
End: 2025-01-29

## 2025-01-29 NOTE — TELEPHONE ENCOUNTER
HOME Sleep study showed mild PARAG (on a scale of mild, moderate and severe).  AHI was 6.8  per hr. (Average times per hr breathing was obstructed).  O2 Desaturations to 83% (lowest o2)   Dr Recommends:    Follow up with the patient's sleep physician to discuss results      Avoid sedatives, alcohol and caffeinated drinks at bedtime.    Recommend:  APAP 5/12    Avoid driving while sleepy.       The patient has been notified of this information and all questions answered.    CARLO [facility]

## 2025-01-30 ENCOUNTER — OFFICE VISIT (OUTPATIENT)
Dept: PRIMARY CARE CLINIC | Age: 25
End: 2025-01-30
Payer: COMMERCIAL

## 2025-01-30 VITALS
BODY MASS INDEX: 29.9 KG/M2 | WEIGHT: 225.6 LBS | HEART RATE: 83 BPM | SYSTOLIC BLOOD PRESSURE: 122 MMHG | TEMPERATURE: 97.9 F | HEIGHT: 73 IN | DIASTOLIC BLOOD PRESSURE: 80 MMHG

## 2025-01-30 DIAGNOSIS — G47.33 OSA (OBSTRUCTIVE SLEEP APNEA): Primary | ICD-10-CM

## 2025-01-30 DIAGNOSIS — F90.2 ATTENTION DEFICIT HYPERACTIVITY DISORDER, COMBINED TYPE: ICD-10-CM

## 2025-01-30 DIAGNOSIS — J32.9 CHRONIC CONGESTION OF PARANASAL SINUS: ICD-10-CM

## 2025-01-30 PROCEDURE — 1036F TOBACCO NON-USER: CPT | Performed by: STUDENT IN AN ORGANIZED HEALTH CARE EDUCATION/TRAINING PROGRAM

## 2025-01-30 PROCEDURE — G2211 COMPLEX E/M VISIT ADD ON: HCPCS | Performed by: STUDENT IN AN ORGANIZED HEALTH CARE EDUCATION/TRAINING PROGRAM

## 2025-01-30 PROCEDURE — 99214 OFFICE O/P EST MOD 30 MIN: CPT | Performed by: STUDENT IN AN ORGANIZED HEALTH CARE EDUCATION/TRAINING PROGRAM

## 2025-01-30 PROCEDURE — G8427 DOCREV CUR MEDS BY ELIG CLIN: HCPCS | Performed by: STUDENT IN AN ORGANIZED HEALTH CARE EDUCATION/TRAINING PROGRAM

## 2025-01-30 PROCEDURE — G8417 CALC BMI ABV UP PARAM F/U: HCPCS | Performed by: STUDENT IN AN ORGANIZED HEALTH CARE EDUCATION/TRAINING PROGRAM

## 2025-01-30 RX ORDER — DEXTROAMPHETAMINE SACCHARATE, AMPHETAMINE ASPARTATE MONOHYDRATE, DEXTROAMPHETAMINE SULFATE AND AMPHETAMINE SULFATE 7.5; 7.5; 7.5; 7.5 MG/1; MG/1; MG/1; MG/1
30 CAPSULE, EXTENDED RELEASE ORAL DAILY
Qty: 30 CAPSULE | Refills: 0 | Status: SHIPPED | OUTPATIENT
Start: 2025-01-31 | End: 2025-03-02

## 2025-01-30 SDOH — ECONOMIC STABILITY: FOOD INSECURITY: WITHIN THE PAST 12 MONTHS, YOU WORRIED THAT YOUR FOOD WOULD RUN OUT BEFORE YOU GOT MONEY TO BUY MORE.: NEVER TRUE

## 2025-01-30 SDOH — ECONOMIC STABILITY: FOOD INSECURITY: WITHIN THE PAST 12 MONTHS, THE FOOD YOU BOUGHT JUST DIDN'T LAST AND YOU DIDN'T HAVE MONEY TO GET MORE.: NEVER TRUE

## 2025-01-30 ASSESSMENT — PATIENT HEALTH QUESTIONNAIRE - PHQ9
4. FEELING TIRED OR HAVING LITTLE ENERGY: NOT AT ALL
8. MOVING OR SPEAKING SO SLOWLY THAT OTHER PEOPLE COULD HAVE NOTICED. OR THE OPPOSITE, BEING SO FIGETY OR RESTLESS THAT YOU HAVE BEEN MOVING AROUND A LOT MORE THAN USUAL: NOT AT ALL
SUM OF ALL RESPONSES TO PHQ QUESTIONS 1-9: 0
6. FEELING BAD ABOUT YOURSELF - OR THAT YOU ARE A FAILURE OR HAVE LET YOURSELF OR YOUR FAMILY DOWN: NOT AT ALL
3. TROUBLE FALLING OR STAYING ASLEEP: NOT AT ALL
7. TROUBLE CONCENTRATING ON THINGS, SUCH AS READING THE NEWSPAPER OR WATCHING TELEVISION: NOT AT ALL
2. FEELING DOWN, DEPRESSED OR HOPELESS: NOT AT ALL
9. THOUGHTS THAT YOU WOULD BE BETTER OFF DEAD, OR OF HURTING YOURSELF: NOT AT ALL
SUM OF ALL RESPONSES TO PHQ9 QUESTIONS 1 & 2: 0
1. LITTLE INTEREST OR PLEASURE IN DOING THINGS: NOT AT ALL
10. IF YOU CHECKED OFF ANY PROBLEMS, HOW DIFFICULT HAVE THESE PROBLEMS MADE IT FOR YOU TO DO YOUR WORK, TAKE CARE OF THINGS AT HOME, OR GET ALONG WITH OTHER PEOPLE: NOT DIFFICULT AT ALL
5. POOR APPETITE OR OVEREATING: NOT AT ALL

## 2025-01-30 ASSESSMENT — ENCOUNTER SYMPTOMS
SHORTNESS OF BREATH: 0
CONSTIPATION: 0
CHEST TIGHTNESS: 0
NAUSEA: 0
ABDOMINAL PAIN: 0

## 2025-01-30 NOTE — PROGRESS NOTES
[facility]  2025    Jr Arroyo (:  2000) is a 24 y.o. male, here for evaluation of the following medical concerns:    Chief Complaint   Patient presents with    COPD    Other     Sleep study results        ASSESSMENT/ PLAN  1. PARAG (obstructive sleep apnea)  Follow-up with sleep medicine as scheduled and schedule APAP consultation     2. Chronic congestion of paranasal sinus  Uncontrolled, follow-up with ENT for continued evaluation and management.  Discussed with the patient's that his daytime symptoms are not related to COPD as he has had a chest CT and pulmonary function testing which were both normal.    3. Attention deficit hyperactivity disorder, combined type  Controlled, medication refilled.  PDMP reviewed.  - amphetamine-dextroamphetamine (ADDERALL XR) 30 MG extended release capsule; Take 1 capsule by mouth daily for 30 days. Max Daily Amount: 30 mg  Dispense: 30 capsule; Refill: 0       Return in about 6 months (around 2025) for ADHD.    HPI  Patient presents today for follow-up on sleep apnea, ADHD, sinus congestion.    He was recently diagnosed with mild PARAG and is awaiting titration of his APAP.  He also plans on losing weight as he notes that this has contributed to his difficulty sleeping.  He continues to take Klonopin nightly for REM sleep behavior disorder, which is managed by sleep medicine.  He notes continued sinus congestion contributing to daytime symptoms causing difficulty breathing.    Patient states that they are well controlled on current ADHD medication regimen; they endorse improvement in attention and focus while at school or work.  They deny insomnia, decreased appetite, palpitations or increased anxiety.    ROS  Review of Systems   Constitutional:  Negative for activity change, appetite change, fatigue and unexpected weight change.   HENT:  Positive for congestion.    Respiratory:  Negative for chest tightness and shortness of breath.

## 2025-02-14 DIAGNOSIS — F90.2 ATTENTION DEFICIT HYPERACTIVITY DISORDER, COMBINED TYPE: ICD-10-CM

## 2025-02-14 RX ORDER — DEXTROAMPHETAMINE SACCHARATE, AMPHETAMINE ASPARTATE, DEXTROAMPHETAMINE SULFATE AND AMPHETAMINE SULFATE 5; 5; 5; 5 MG/1; MG/1; MG/1; MG/1
20 TABLET ORAL DAILY
Qty: 30 TABLET | Refills: 0 | Status: SHIPPED | OUTPATIENT
Start: 2025-02-14 | End: 2025-03-16

## 2025-02-14 NOTE — TELEPHONE ENCOUNTER
Medication:   Requested Prescriptions     Pending Prescriptions Disp Refills    amphetamine-dextroamphetamine (ADDERALL, 20MG,) 20 MG tablet 30 tablet 0     Sig: Take 1 tablet by mouth daily for 30 days. Max Daily Amount: 20 mg        Last Filled:    1/31/25  Patient Phone Number: [phone] (home) [phone] (work)    Last appt: 1/30/2025   Return in about 6 months (around 3/30/2025) for annual physical, ADHD  Next appt: Visit date not found    Last OARRS:        No data to display

## 2025-02-14 NOTE — TELEPHONE ENCOUNTER
Medication:   Requested Prescriptions     Pending Prescriptions Disp Refills    amphetamine-dextroamphetamine (ADDERALL, 20MG,) 20 MG tablet 30 tablet 0     Sig: Take 1 tablet by mouth daily for 30 days. Max Daily Amount: 20 mg        Last Filled:  01/20/25    Patient Phone Number: [phone] (home) [phone] (work)    Last appt: 1/30/2025 Return in about 6 months (around 7/30/2025) for ADHD.   Next appt: Visit date not found    Last OARRS:        No data to display

## 2025-02-28 DIAGNOSIS — F90.2 ATTENTION DEFICIT HYPERACTIVITY DISORDER, COMBINED TYPE: ICD-10-CM

## 2025-02-28 RX ORDER — DEXTROAMPHETAMINE SACCHARATE, AMPHETAMINE ASPARTATE MONOHYDRATE, DEXTROAMPHETAMINE SULFATE AND AMPHETAMINE SULFATE 7.5; 7.5; 7.5; 7.5 MG/1; MG/1; MG/1; MG/1
30 CAPSULE, EXTENDED RELEASE ORAL DAILY
Qty: 30 CAPSULE | Refills: 0 | Status: SHIPPED | OUTPATIENT
Start: 2025-02-28 | End: 2025-03-30

## 2025-02-28 NOTE — TELEPHONE ENCOUNTER
Medication:   Requested Prescriptions     Pending Prescriptions Disp Refills    amphetamine-dextroamphetamine (ADDERALL XR) 30 MG extended release capsule 30 capsule 0     Sig: Take 1 capsule by mouth daily for 30 days. Max Daily Amount: 30 mg        Last Filled:  1/31/2025    Patient Phone Number: [phone] (home) [phone] (work)    Last appt: 1/30/2025   Next appt: Visit date not found          Return in about 6 months (around 7/30/2025) for ADHD.

## 2025-03-10 ENCOUNTER — TELEPHONE (OUTPATIENT)
Dept: PULMONOLOGY | Age: 25
End: 2025-03-10

## 2025-03-10 NOTE — TELEPHONE ENCOUNTER
Patient states that his nasal pillow mask broke and he needs a new one. He's unsure of the size. DME [facility]  Please send an order for a new mask to [facility] for him.

## 2025-03-10 NOTE — TELEPHONE ENCOUNTER
Alexia with [facility] said that she will call patient. He just got the mask in February and is not eligible but if he wants to pay out of pocket he can.

## 2025-03-15 DIAGNOSIS — F90.2 ATTENTION DEFICIT HYPERACTIVITY DISORDER, COMBINED TYPE: ICD-10-CM

## 2025-03-17 RX ORDER — DEXTROAMPHETAMINE SACCHARATE, AMPHETAMINE ASPARTATE, DEXTROAMPHETAMINE SULFATE AND AMPHETAMINE SULFATE 5; 5; 5; 5 MG/1; MG/1; MG/1; MG/1
20 TABLET ORAL DAILY
Qty: 30 TABLET | Refills: 0 | Status: SHIPPED | OUTPATIENT
Start: 2025-03-17 | End: 2025-04-16

## 2025-03-27 DIAGNOSIS — F90.2 ATTENTION DEFICIT HYPERACTIVITY DISORDER, COMBINED TYPE: ICD-10-CM

## 2025-03-28 RX ORDER — DEXTROAMPHETAMINE SACCHARATE, AMPHETAMINE ASPARTATE MONOHYDRATE, DEXTROAMPHETAMINE SULFATE AND AMPHETAMINE SULFATE 7.5; 7.5; 7.5; 7.5 MG/1; MG/1; MG/1; MG/1
30 CAPSULE, EXTENDED RELEASE ORAL DAILY
Qty: 30 CAPSULE | Refills: 0 | Status: SHIPPED | OUTPATIENT
Start: 2025-03-28 | End: 2025-04-27

## 2025-03-28 NOTE — TELEPHONE ENCOUNTER
Medication:   Requested Prescriptions     Pending Prescriptions Disp Refills    amphetamine-dextroamphetamine (ADDERALL XR) 30 MG extended release capsule 30 capsule 0     Sig: Take 1 capsule by mouth daily for 30 days. Max Daily Amount: 30 mg        Last Filled:  2/28/25    Patient Phone Number: [phone] (home) [phone] (work)    Last appt: 1/30/2025 Return in about 6 months (around 7/30/2025) for ADHD.  Next appt: Visit date not found    Last OARRS:        No data to display

## 2025-04-10 DIAGNOSIS — F90.2 ATTENTION DEFICIT HYPERACTIVITY DISORDER, COMBINED TYPE: ICD-10-CM

## 2025-04-11 RX ORDER — DEXTROAMPHETAMINE SACCHARATE, AMPHETAMINE ASPARTATE, DEXTROAMPHETAMINE SULFATE AND AMPHETAMINE SULFATE 5; 5; 5; 5 MG/1; MG/1; MG/1; MG/1
20 TABLET ORAL DAILY
Qty: 30 TABLET | Refills: 0 | OUTPATIENT
Start: 2025-04-11 | End: 2025-05-11

## 2025-04-17 DIAGNOSIS — F90.2 ATTENTION DEFICIT HYPERACTIVITY DISORDER, COMBINED TYPE: ICD-10-CM

## 2025-04-17 RX ORDER — DEXTROAMPHETAMINE SACCHARATE, AMPHETAMINE ASPARTATE, DEXTROAMPHETAMINE SULFATE AND AMPHETAMINE SULFATE 5; 5; 5; 5 MG/1; MG/1; MG/1; MG/1
20 TABLET ORAL DAILY
Qty: 30 TABLET | Refills: 0 | Status: SHIPPED | OUTPATIENT
Start: 2025-04-17 | End: 2025-05-17

## 2025-04-17 NOTE — TELEPHONE ENCOUNTER
Medication:   Requested Prescriptions     Pending Prescriptions Disp Refills    amphetamine-dextroamphetamine (ADDERALL, 20MG,) 20 MG tablet 30 tablet 0     Sig: Take 1 tablet by mouth daily for 30 days. Max Daily Amount: 20 mg        Last Filled:    3/17/25  Patient Phone Number: [phone] (home) [phone] (work)    Return in about 6 months (around 7/30/2025) for ADHD.  Last appt: 1/30/2025   Next appt: Visit date not found    Last OARRS:        No data to display

## 2025-04-21 ENCOUNTER — OFFICE VISIT (OUTPATIENT)
Dept: PRIMARY CARE CLINIC | Age: 25
End: 2025-04-21
Payer: COMMERCIAL

## 2025-04-21 VITALS
TEMPERATURE: 97.6 F | BODY MASS INDEX: 29.66 KG/M2 | HEIGHT: 72 IN | SYSTOLIC BLOOD PRESSURE: 114 MMHG | WEIGHT: 219 LBS | OXYGEN SATURATION: 98 % | HEART RATE: 79 BPM | DIASTOLIC BLOOD PRESSURE: 78 MMHG

## 2025-04-21 DIAGNOSIS — F41.9 ANXIETY: Primary | ICD-10-CM

## 2025-04-21 DIAGNOSIS — F90.2 ATTENTION DEFICIT HYPERACTIVITY DISORDER, COMBINED TYPE: ICD-10-CM

## 2025-04-21 PROCEDURE — 1036F TOBACCO NON-USER: CPT | Performed by: STUDENT IN AN ORGANIZED HEALTH CARE EDUCATION/TRAINING PROGRAM

## 2025-04-21 PROCEDURE — G8427 DOCREV CUR MEDS BY ELIG CLIN: HCPCS | Performed by: STUDENT IN AN ORGANIZED HEALTH CARE EDUCATION/TRAINING PROGRAM

## 2025-04-21 PROCEDURE — 99214 OFFICE O/P EST MOD 30 MIN: CPT | Performed by: STUDENT IN AN ORGANIZED HEALTH CARE EDUCATION/TRAINING PROGRAM

## 2025-04-21 PROCEDURE — G8417 CALC BMI ABV UP PARAM F/U: HCPCS | Performed by: STUDENT IN AN ORGANIZED HEALTH CARE EDUCATION/TRAINING PROGRAM

## 2025-04-21 RX ORDER — DEXTROAMPHETAMINE SACCHARATE, AMPHETAMINE ASPARTATE MONOHYDRATE, DEXTROAMPHETAMINE SULFATE AND AMPHETAMINE SULFATE 7.5; 7.5; 7.5; 7.5 MG/1; MG/1; MG/1; MG/1
30 CAPSULE, EXTENDED RELEASE ORAL DAILY
Qty: 30 CAPSULE | Refills: 0 | Status: SHIPPED | OUTPATIENT
Start: 2025-04-21 | End: 2025-05-21

## 2025-04-21 RX ORDER — DEXTROAMPHETAMINE SACCHARATE, AMPHETAMINE ASPARTATE, DEXTROAMPHETAMINE SULFATE AND AMPHETAMINE SULFATE 7.5; 7.5; 7.5; 7.5 MG/1; MG/1; MG/1; MG/1
30 TABLET ORAL DAILY
Qty: 30 TABLET | Refills: 0 | Status: SHIPPED | OUTPATIENT
Start: 2025-04-21 | End: 2025-05-21

## 2025-04-21 ASSESSMENT — ENCOUNTER SYMPTOMS
NAUSEA: 0
CONSTIPATION: 0
CHEST TIGHTNESS: 0
ABDOMINAL PAIN: 0
SHORTNESS OF BREATH: 0

## 2025-04-21 NOTE — PROGRESS NOTES
[facility]  2025    Jr Arroyo (:  2000) is a 24 y.o. male, here for evaluation of the following medical concerns:    Chief Complaint   Patient presents with    Annual Exam    ADHD     Adjust dosage. Increase IR from current 20 mg to 30 mg.   XR dose is fine for now.       Medication Check     Start to decrease duloxetine. Goal is to completely be off off duloxetine.         ASSESSMENT/ PLAN  1. Attention deficit hyperactivity disorder, combined type  Controlled, medications refilled.  PDMP reviewed and appropriate.  - amphetamine-dextroamphetamine (ADDERALL XR) 30 MG extended release capsule; Take 1 capsule by mouth daily for 30 days. Max Daily Amount: 30 mg  Dispense: 30 capsule; Refill: 0  - amphetamine-dextroamphetamine (ADDERALL, 30MG,) 30 MG tablet; Take 1 tablet by mouth daily for 30 days. Max Daily Amount: 30 mg  Dispense: 30 tablet; Refill: 0    2. Anxiety  Uncontrolled, okay to discontinue Cymbalta.  Continue to monitor mood and he will follow-up if there are any recurrent symptoms.       Return in about 6 months (around 10/21/2025) for annual physical, ADHD.    HPI  Patient presents today for ADHD, anxiety medication management.    Patient states that they are well controlled on current ADHD medication regimen; they endorse improvement in attention and focus while at school or work.  They deny insomnia, decreased appetite, palpitations or increased anxiety.    Would like to wean off of Cymbalta at this time.  Was previously taking for unspecified joint pain and anxiety.  Notes overall improvement in his mood and resolution of joint pain symptoms.    ROS  Review of Systems   Constitutional:  Negative for activity change, appetite change, fatigue and unexpected weight change.   Respiratory:  Negative for chest tightness and shortness of breath.    Cardiovascular:  Negative for palpitations.   Gastrointestinal:  Negative for abdominal pain, constipation and nausea.

## 2025-04-24 ENCOUNTER — TELEPHONE (OUTPATIENT)
Dept: PULMONOLOGY | Age: 25
End: 2025-04-24

## 2025-04-24 NOTE — TELEPHONE ENCOUNTER
Pt called back and stated that Jose told him that MD needs to call and say it is ok for an early refill and that the pt will be paying out of pocket for this bc insurance will not cover

## 2025-04-24 NOTE — TELEPHONE ENCOUNTER
clonazePAM (KLONOPIN) 2 MG tablet [[id number]]     Patient lost his bottle in [place]. He says this is an emergency and he needs them to sleep.  [facility] #[id number] [place] [address] - P [phone]

## 2025-04-28 DIAGNOSIS — F90.2 ATTENTION DEFICIT HYPERACTIVITY DISORDER, COMBINED TYPE: ICD-10-CM

## 2025-04-29 RX ORDER — DEXTROAMPHETAMINE SACCHARATE, AMPHETAMINE ASPARTATE MONOHYDRATE, DEXTROAMPHETAMINE SULFATE AND AMPHETAMINE SULFATE 7.5; 7.5; 7.5; 7.5 MG/1; MG/1; MG/1; MG/1
30 CAPSULE, EXTENDED RELEASE ORAL DAILY
Qty: 30 CAPSULE | Refills: 0 | OUTPATIENT
Start: 2025-04-29 | End: 2025-05-29

## 2025-04-30 ASSESSMENT — SLEEP AND FATIGUE QUESTIONNAIRES
ESS TOTAL SCORE: 6
HOW LIKELY ARE YOU TO NOD OFF OR FALL ASLEEP WHILE WATCHING TV: SLIGHT CHANCE OF DOZING
HOW LIKELY ARE YOU TO NOD OFF OR FALL ASLEEP WHILE WATCHING TV: SLIGHT CHANCE OF DOZING
HOW LIKELY ARE YOU TO NOD OFF OR FALL ASLEEP WHEN YOU ARE A PASSENGER IN A CAR FOR AN HOUR WITHOUT A BREAK: SLIGHT CHANCE OF DOZING
HOW LIKELY ARE YOU TO NOD OFF OR FALL ASLEEP WHILE SITTING AND TALKING TO SOMEONE: WOULD NEVER DOZE
HOW LIKELY ARE YOU TO NOD OFF OR FALL ASLEEP IN A CAR, WHILE STOPPED FOR A FEW MINUTES IN TRAFFIC: WOULD NEVER DOZE
HOW LIKELY ARE YOU TO NOD OFF OR FALL ASLEEP WHILE SITTING INACTIVE IN A PUBLIC PLACE: SLIGHT CHANCE OF DOZING
HOW LIKELY ARE YOU TO NOD OFF OR FALL ASLEEP WHEN YOU ARE A PASSENGER IN A CAR FOR AN HOUR WITHOUT A BREAK: SLIGHT CHANCE OF DOZING
HOW LIKELY ARE YOU TO NOD OFF OR FALL ASLEEP WHILE SITTING INACTIVE IN A PUBLIC PLACE: SLIGHT CHANCE OF DOZING
HOW LIKELY ARE YOU TO NOD OFF OR FALL ASLEEP WHILE LYING DOWN TO REST IN THE AFTERNOON WHEN CIRCUMSTANCES PERMIT: MODERATE CHANCE OF DOZING
HOW LIKELY ARE YOU TO NOD OFF OR FALL ASLEEP WHILE SITTING AND READING: SLIGHT CHANCE OF DOZING
HOW LIKELY ARE YOU TO NOD OFF OR FALL ASLEEP WHILE SITTING QUIETLY AFTER LUNCH WITHOUT ALCOHOL: WOULD NEVER DOZE
HOW LIKELY ARE YOU TO NOD OFF OR FALL ASLEEP WHILE SITTING QUIETLY AFTER LUNCH WITHOUT ALCOHOL: WOULD NEVER DOZE
HOW LIKELY ARE YOU TO NOD OFF OR FALL ASLEEP WHILE SITTING AND TALKING TO SOMEONE: WOULD NEVER DOZE
HOW LIKELY ARE YOU TO NOD OFF OR FALL ASLEEP WHILE LYING DOWN TO REST IN THE AFTERNOON WHEN CIRCUMSTANCES PERMIT: MODERATE CHANCE OF DOZING
HOW LIKELY ARE YOU TO NOD OFF OR FALL ASLEEP IN A CAR, WHILE STOPPED FOR A FEW MINUTES IN TRAFFIC: WOULD NEVER DOZE
HOW LIKELY ARE YOU TO NOD OFF OR FALL ASLEEP WHILE SITTING AND READING: SLIGHT CHANCE OF DOZING

## 2025-05-01 ENCOUNTER — OFFICE VISIT (OUTPATIENT)
Dept: SLEEP MEDICINE | Age: 25
End: 2025-05-01
Payer: COMMERCIAL

## 2025-05-01 VITALS
RESPIRATION RATE: 19 BRPM | HEART RATE: 75 BPM | HEIGHT: 72 IN | WEIGHT: 214 LBS | BODY MASS INDEX: 28.99 KG/M2 | OXYGEN SATURATION: 99 % | DIASTOLIC BLOOD PRESSURE: 70 MMHG | TEMPERATURE: 96.9 F | SYSTOLIC BLOOD PRESSURE: 115 MMHG

## 2025-05-01 DIAGNOSIS — G47.33 OSA ON CPAP: Primary | ICD-10-CM

## 2025-05-01 DIAGNOSIS — G47.52 REM SLEEP BEHAVIOR DISORDER: ICD-10-CM

## 2025-05-01 DIAGNOSIS — Z99.89 DEPENDENCE ON OTHER ENABLING MACHINES AND DEVICES: ICD-10-CM

## 2025-05-01 PROCEDURE — G8417 CALC BMI ABV UP PARAM F/U: HCPCS | Performed by: PSYCHIATRY & NEUROLOGY

## 2025-05-01 PROCEDURE — 99214 OFFICE O/P EST MOD 30 MIN: CPT | Performed by: PSYCHIATRY & NEUROLOGY

## 2025-05-01 PROCEDURE — G8427 DOCREV CUR MEDS BY ELIG CLIN: HCPCS | Performed by: PSYCHIATRY & NEUROLOGY

## 2025-05-01 PROCEDURE — G2211 COMPLEX E/M VISIT ADD ON: HCPCS | Performed by: PSYCHIATRY & NEUROLOGY

## 2025-05-01 PROCEDURE — 1036F TOBACCO NON-USER: CPT | Performed by: PSYCHIATRY & NEUROLOGY

## 2025-05-01 NOTE — PROGRESS NOTES
Jr Dina         : 2000  [x] [facility]     [] [facility]      [] [facility]     [][facility]    [] [facility]  [] [facility]   [] [facility]  [] [facility] [] [employer] [] [facility] [] [facility] [] [facility] [] [facility]  Diagnosis: [x] PARAG (G47.33) [] CSA (G47.31) [] Apnea (G47.30)   Length of Need: [] 12 Months [x] 99 Months [] Other:    Machine (DONNY!):  [x] ResMed AirSense     Auto [] Other:     [x]  CPAP () [] Bilevel ()   Mode: [x] Auto [] Spontaneous    Mode: [] Auto [] Spontaneous           D/C the CPAP                   Start Order Date: 25    MEDICAL JUSTIFICATION:  I, the undersigned, certify that the above prescribed supplies are medically necessary for this patient’s wellbeing.  In my opinion, the supplies are both reasonable and necessary in reference to accepted standards of medicalpractice in treatment of this patient’s condition.    Martin Corona MD      NPI: [id number]       Order Signed Date: 25    Electronically signed by Martin Corona MD on 2025 at 1:29 PM

## 2025-05-01 NOTE — PROGRESS NOTES
MD RYDER Corona Board Certified in Sleep Medicine  Certified in Behavioral Sleep Medicine  Board Certified in Neurology [facility]  [address] [facility]   [address]  [place]  [address]  [phone]   [facility]  [address]  [address]  [place], [place] [address]                      [facility] PHYSICIANS [place] [facility]  [address] [facility] [address] [phone]    Subjective:     Patient ID: Jr Arroyo is a 24 y.o. male.    Chief Complaint   Patient presents with    Follow-up    Sleep Apnea       HPI:        Jr Arroyo is a 24 y.o. male was seen today as a follow for obstructive sleep apnea and RBD. The patient underwent home sleep testing on 01/28/2025, the overnight registration revealed mild obstructive sleep apnea with apnea hypopnea index of 6.8/hr with lowest O2 saturation of 83%, patient spent about 2.6 minutes below 90% (weight was 228 pounds).   Patient is using the PAP machine about 93% of the time, more than 4 hours a night about  82 %, in total average of 6:47 hours a night in last 82 days.  Currently on PAP at 7.9 cm (5-12), the AHI is only 0.7 events per hour at this pressure.  He feels no different, he improved anyway with 14 pounds weight loss.  The Patient scored [place] Sleepiness Score: (Patient-Rptd) 6 on [place] Sleepiness Scale ( more than 10 is indicative of daytime sleepiness)   Patient has no problem with PAP pressure or mask.  Wakes up in the morning with dry mouth, the setting of the heated humidifier   Has lost 14 pounds since last visit, and feels the machine is not needed.     No much hypnic jerk or moving during the REM with Klonopin 3 mg, very mild jerking on his muscles nut not extensive. .  most of the night   mg. but when he has hypnic jerks he takes 3 mg,no side effects.  Needs only 3 mg of Klonopin.     DOT/CDL - N/A      Previous

## 2025-05-15 DIAGNOSIS — F90.2 ATTENTION DEFICIT HYPERACTIVITY DISORDER, COMBINED TYPE: ICD-10-CM

## 2025-05-15 RX ORDER — DEXTROAMPHETAMINE SACCHARATE, AMPHETAMINE ASPARTATE, DEXTROAMPHETAMINE SULFATE AND AMPHETAMINE SULFATE 7.5; 7.5; 7.5; 7.5 MG/1; MG/1; MG/1; MG/1
30 TABLET ORAL DAILY
Qty: 30 TABLET | Refills: 0 | Status: SHIPPED | OUTPATIENT
Start: 2025-05-15 | End: 2025-06-14

## 2025-05-15 RX ORDER — DEXTROAMPHETAMINE SACCHARATE, AMPHETAMINE ASPARTATE MONOHYDRATE, DEXTROAMPHETAMINE SULFATE AND AMPHETAMINE SULFATE 7.5; 7.5; 7.5; 7.5 MG/1; MG/1; MG/1; MG/1
30 CAPSULE, EXTENDED RELEASE ORAL DAILY
Qty: 30 CAPSULE | Refills: 0 | Status: SHIPPED | OUTPATIENT
Start: 2025-05-15 | End: 2025-06-14

## 2025-05-15 NOTE — TELEPHONE ENCOUNTER
Medication:   Requested Prescriptions     Pending Prescriptions Disp Refills    amphetamine-dextroamphetamine (ADDERALL XR) 30 MG extended release capsule 30 capsule 0     Sig: Take 1 capsule by mouth daily for 30 days. Max Daily Amount: 30 mg    amphetamine-dextroamphetamine (ADDERALL, 30MG,) 30 MG tablet 30 tablet 0     Sig: Take 1 tablet by mouth daily for 30 days. Max Daily Amount: 30 mg        Last Filled:  04/21/25    Patient Phone Number: [phone] (home) [phone] (work)    Last appt: 4/21/2025   Next appt: 10/20/2025    Last OARRS:        No data to display

## 2025-06-10 ENCOUNTER — OFFICE VISIT (OUTPATIENT)
Dept: PRIMARY CARE CLINIC | Age: 25
End: 2025-06-10
Payer: COMMERCIAL

## 2025-06-10 VITALS
SYSTOLIC BLOOD PRESSURE: 127 MMHG | DIASTOLIC BLOOD PRESSURE: 80 MMHG | WEIGHT: 216 LBS | TEMPERATURE: 97.7 F | HEIGHT: 72 IN | BODY MASS INDEX: 29.26 KG/M2

## 2025-06-10 DIAGNOSIS — R68.82 LOW LIBIDO: Primary | ICD-10-CM

## 2025-06-10 DIAGNOSIS — R68.82 LOW LIBIDO: ICD-10-CM

## 2025-06-10 DIAGNOSIS — F41.9 ANXIETY: ICD-10-CM

## 2025-06-10 PROCEDURE — G8427 DOCREV CUR MEDS BY ELIG CLIN: HCPCS | Performed by: STUDENT IN AN ORGANIZED HEALTH CARE EDUCATION/TRAINING PROGRAM

## 2025-06-10 PROCEDURE — G8417 CALC BMI ABV UP PARAM F/U: HCPCS | Performed by: STUDENT IN AN ORGANIZED HEALTH CARE EDUCATION/TRAINING PROGRAM

## 2025-06-10 PROCEDURE — 99214 OFFICE O/P EST MOD 30 MIN: CPT | Performed by: STUDENT IN AN ORGANIZED HEALTH CARE EDUCATION/TRAINING PROGRAM

## 2025-06-10 PROCEDURE — 1036F TOBACCO NON-USER: CPT | Performed by: STUDENT IN AN ORGANIZED HEALTH CARE EDUCATION/TRAINING PROGRAM

## 2025-06-10 ASSESSMENT — ENCOUNTER SYMPTOMS
NAUSEA: 0
SHORTNESS OF BREATH: 0
WHEEZING: 0

## 2025-06-11 DIAGNOSIS — R68.82 LOW LIBIDO: ICD-10-CM

## 2025-06-11 LAB
25(OH)D3 SERPL-MCNC: 37.1 NG/ML
ALBUMIN SERPL-MCNC: 4.6 G/DL (ref 3.4–5)
ALBUMIN/GLOB SERPL: 1.9 {RATIO} (ref 1.1–2.2)
ALP SERPL-CCNC: 47 U/L (ref 40–129)
ALT SERPL-CCNC: 57 U/L (ref 10–40)
ANION GAP SERPL CALCULATED.3IONS-SCNC: 11 MMOL/L (ref 3–16)
AST SERPL-CCNC: 33 U/L (ref 15–37)
BASOPHILS # BLD: 0.1 K/UL (ref 0–0.2)
BASOPHILS NFR BLD: 1.2 %
BILIRUB SERPL-MCNC: 0.6 MG/DL (ref 0–1)
BUN SERPL-MCNC: 18 MG/DL (ref 7–20)
CALCIUM SERPL-MCNC: 9.5 MG/DL (ref 8.3–10.6)
CHLORIDE SERPL-SCNC: 103 MMOL/L (ref 99–110)
CO2 SERPL-SCNC: 25 MMOL/L (ref 21–32)
CREAT SERPL-MCNC: 1.2 MG/DL (ref 0.9–1.3)
DEPRECATED RDW RBC AUTO: 14.4 % (ref 12.4–15.4)
EOSINOPHIL # BLD: 0.1 K/UL (ref 0–0.6)
EOSINOPHIL NFR BLD: 2.3 %
GFR SERPLBLD CREATININE-BSD FMLA CKD-EPI: 85 ML/MIN/{1.73_M2}
GLUCOSE SERPL-MCNC: 88 MG/DL (ref 70–99)
HCT VFR BLD AUTO: 45.9 % (ref 40.5–52.5)
HGB BLD-MCNC: 15.2 G/DL (ref 13.5–17.5)
LH SERPL-ACNC: 7.4 MIU/ML
LYMPHOCYTES # BLD: 1.5 K/UL (ref 1–5.1)
LYMPHOCYTES NFR BLD: 24.8 %
MCH RBC QN AUTO: 29.2 PG (ref 26–34)
MCHC RBC AUTO-ENTMCNC: 33.2 G/DL (ref 31–36)
MCV RBC AUTO: 88 FL (ref 80–100)
MONOCYTES # BLD: 0.5 K/UL (ref 0–1.3)
MONOCYTES NFR BLD: 9.3 %
NEUTROPHILS # BLD: 3.7 K/UL (ref 1.7–7.7)
NEUTROPHILS NFR BLD: 62.4 %
PLATELET # BLD AUTO: 231 K/UL (ref 135–450)
PMV BLD AUTO: 11.4 FL (ref 5–10.5)
POTASSIUM SERPL-SCNC: 4.1 MMOL/L (ref 3.5–5.1)
PROLACTIN SERPL IA-MCNC: 11.7 NG/ML
PROT SERPL-MCNC: 7 G/DL (ref 6.4–8.2)
RBC # BLD AUTO: 5.22 M/UL (ref 4.2–5.9)
SODIUM SERPL-SCNC: 139 MMOL/L (ref 136–145)
T3FREE SERPL-MCNC: 2.7 PG/ML (ref 2.3–4.2)
T4 FREE SERPL-MCNC: 0.9 NG/DL (ref 0.9–1.8)
TSH SERPL DL<=0.005 MIU/L-ACNC: 1.5 UIU/ML (ref 0.27–4.2)
WBC # BLD AUTO: 5.8 K/UL (ref 4–11)

## 2025-06-12 ENCOUNTER — RESULTS FOLLOW-UP (OUTPATIENT)
Dept: PRIMARY CARE CLINIC | Age: 25
End: 2025-06-12

## 2025-06-13 LAB
SHBG SERPL-SCNC: 11 NMOL/L (ref 17–56)
TESTOST FREE SERPL-MCNC: 76.6 PG/ML (ref 47–244)
TESTOST SERPL-MCNC: 246 NG/DL (ref 249–836)

## 2025-06-14 DIAGNOSIS — F90.2 ATTENTION DEFICIT HYPERACTIVITY DISORDER, COMBINED TYPE: ICD-10-CM

## 2025-06-16 ENCOUNTER — TELEMEDICINE (OUTPATIENT)
Dept: PRIMARY CARE CLINIC | Age: 25
End: 2025-06-16
Payer: COMMERCIAL

## 2025-06-16 DIAGNOSIS — R68.82 LOW LIBIDO: ICD-10-CM

## 2025-06-16 DIAGNOSIS — R79.89 LOW TESTOSTERONE IN MALE: Primary | ICD-10-CM

## 2025-06-16 PROCEDURE — G8427 DOCREV CUR MEDS BY ELIG CLIN: HCPCS | Performed by: STUDENT IN AN ORGANIZED HEALTH CARE EDUCATION/TRAINING PROGRAM

## 2025-06-16 PROCEDURE — 1036F TOBACCO NON-USER: CPT | Performed by: STUDENT IN AN ORGANIZED HEALTH CARE EDUCATION/TRAINING PROGRAM

## 2025-06-16 PROCEDURE — 99214 OFFICE O/P EST MOD 30 MIN: CPT | Performed by: STUDENT IN AN ORGANIZED HEALTH CARE EDUCATION/TRAINING PROGRAM

## 2025-06-16 PROCEDURE — G8417 CALC BMI ABV UP PARAM F/U: HCPCS | Performed by: STUDENT IN AN ORGANIZED HEALTH CARE EDUCATION/TRAINING PROGRAM

## 2025-06-16 RX ORDER — DEXTROAMPHETAMINE SACCHARATE, AMPHETAMINE ASPARTATE, DEXTROAMPHETAMINE SULFATE AND AMPHETAMINE SULFATE 7.5; 7.5; 7.5; 7.5 MG/1; MG/1; MG/1; MG/1
30 TABLET ORAL DAILY
Qty: 30 TABLET | Refills: 0 | Status: SHIPPED | OUTPATIENT
Start: 2025-06-16 | End: 2025-07-16

## 2025-06-16 RX ORDER — DEXTROAMPHETAMINE SACCHARATE, AMPHETAMINE ASPARTATE MONOHYDRATE, DEXTROAMPHETAMINE SULFATE AND AMPHETAMINE SULFATE 7.5; 7.5; 7.5; 7.5 MG/1; MG/1; MG/1; MG/1
30 CAPSULE, EXTENDED RELEASE ORAL DAILY
Qty: 30 CAPSULE | Refills: 0 | Status: SHIPPED | OUTPATIENT
Start: 2025-06-16 | End: 2025-07-16

## 2025-06-16 ASSESSMENT — ENCOUNTER SYMPTOMS
NAUSEA: 0
CONSTIPATION: 0
ABDOMINAL PAIN: 0
CHEST TIGHTNESS: 0
SHORTNESS OF BREATH: 0

## 2025-06-16 NOTE — TELEPHONE ENCOUNTER
Medication:   Requested Prescriptions     Pending Prescriptions Disp Refills    amphetamine-dextroamphetamine (ADDERALL XR) 30 MG extended release capsule 30 capsule 0     Sig: Take 1 capsule by mouth daily for 30 days. Max Daily Amount: 30 mg    amphetamine-dextroamphetamine (ADDERALL, 30MG,) 30 MG tablet 30 tablet 0     Sig: Take 1 tablet by mouth daily for 30 days. Max Daily Amount: 30 mg        Last Filled:  05/15/25    Patient Phone Number: [phone] (home) [phone] (work)    Last appt: 6/10/2025   Next appt: 6/16/2025    Last OARRS:        No data to display

## 2025-06-16 NOTE — PROGRESS NOTES
Musculoskeletal:   [x] Normal gait with no signs of ataxia         [x] Normal range of motion of neck        [] Abnormal -     Neurological:        [x] No Facial Asymmetry (Cranial nerve 7 motor function) (limited exam due to video visit)          [x] No gaze palsy        [] Abnormal -          Skin:        [x] No significant exanthematous lesions or discoloration noted on facial skin         [] Abnormal -            Psychiatric:       [x] Normal Affect [] Abnormal -        [x] No Hallucinations    Other pertinent observable physical exam findings:-      Jr Arroyo, was evaluated through a synchronous (real-time) audio-video encounter. The patient (or guardian if applicable) is aware that this is a billable service, which includes applicable co-pays. This Virtual Visit was conducted with patient's (and/or legal guardian's) consent. Patient identification was verified, and a caregiver was present when appropriate.   The patient was located at Home: [address] [place] [address] [address]  [place] [id number]  Provider was located at Facility (Appt Dept): [address] [place] [address]  Confirm you are appropriately licensed, registered, or certified to deliver care in the [facility] where the patient is located as indicated above. If you are not or unsure, please re-schedule the visit: Yes, I confirm.        --Madeline Yousif DO on 6/16/2025 at 9:16 AM    An electronic signature was used to authenticate this note.     Madeline Yousif DO    This dictation was generated by voice recognition computer software.  Although all attempts are made to edit the dictation for accuracy, there may be errors in the transcription that are not intended.

## 2025-06-24 ENCOUNTER — TELEMEDICINE (OUTPATIENT)
Dept: PRIMARY CARE CLINIC | Age: 25
End: 2025-06-24
Payer: COMMERCIAL

## 2025-06-24 DIAGNOSIS — F41.9 ANXIETY: ICD-10-CM

## 2025-06-24 DIAGNOSIS — R79.89 LOW TESTOSTERONE IN MALE: Primary | ICD-10-CM

## 2025-06-24 PROCEDURE — 1036F TOBACCO NON-USER: CPT | Performed by: STUDENT IN AN ORGANIZED HEALTH CARE EDUCATION/TRAINING PROGRAM

## 2025-06-24 PROCEDURE — 99214 OFFICE O/P EST MOD 30 MIN: CPT | Performed by: STUDENT IN AN ORGANIZED HEALTH CARE EDUCATION/TRAINING PROGRAM

## 2025-06-24 PROCEDURE — G8417 CALC BMI ABV UP PARAM F/U: HCPCS | Performed by: STUDENT IN AN ORGANIZED HEALTH CARE EDUCATION/TRAINING PROGRAM

## 2025-06-24 PROCEDURE — G8427 DOCREV CUR MEDS BY ELIG CLIN: HCPCS | Performed by: STUDENT IN AN ORGANIZED HEALTH CARE EDUCATION/TRAINING PROGRAM

## 2025-06-24 ASSESSMENT — ENCOUNTER SYMPTOMS
WHEEZING: 0
NAUSEA: 0
SHORTNESS OF BREATH: 0

## 2025-07-01 DIAGNOSIS — F90.2 ATTENTION DEFICIT HYPERACTIVITY DISORDER, COMBINED TYPE: ICD-10-CM

## 2025-07-01 RX ORDER — DEXTROAMPHETAMINE SACCHARATE, AMPHETAMINE ASPARTATE MONOHYDRATE, DEXTROAMPHETAMINE SULFATE AND AMPHETAMINE SULFATE 7.5; 7.5; 7.5; 7.5 MG/1; MG/1; MG/1; MG/1
30 CAPSULE, EXTENDED RELEASE ORAL DAILY
Qty: 30 CAPSULE | Refills: 0 | Status: SHIPPED | OUTPATIENT
Start: 2025-07-01 | End: 2025-07-31

## 2025-07-01 NOTE — TELEPHONE ENCOUNTER
Pharmacy is out of amphetamine-dextroamphetamine (ADDERALL XR) 30 MG extended release capsule     Needs sent to NEW pharmacy PLEASE NOTE    [facility] [id number] - [address] - P [phone] - F [phone] [[id number]]     Call pt when sent please

## 2025-07-01 NOTE — TELEPHONE ENCOUNTER
Medication:   Requested Prescriptions     Pending Prescriptions Disp Refills    amphetamine-dextroamphetamine (ADDERALL XR) 30 MG extended release capsule 30 capsule 0     Sig: Take 1 capsule by mouth daily for 30 days. Max Daily Amount: 30 mg        Last Filled:  [facility]     Patient Phone Number: [phone] (home) [phone] (work)    Last appt: 6/24/2025   Next appt: 10/20/2025

## 2025-07-18 DIAGNOSIS — F90.2 ATTENTION DEFICIT HYPERACTIVITY DISORDER, COMBINED TYPE: ICD-10-CM

## 2025-07-21 RX ORDER — DEXTROAMPHETAMINE SACCHARATE, AMPHETAMINE ASPARTATE, DEXTROAMPHETAMINE SULFATE AND AMPHETAMINE SULFATE 7.5; 7.5; 7.5; 7.5 MG/1; MG/1; MG/1; MG/1
30 TABLET ORAL DAILY
Qty: 30 TABLET | Refills: 0 | Status: SHIPPED | OUTPATIENT
Start: 2025-07-21 | End: 2025-08-20

## 2025-07-21 NOTE — TELEPHONE ENCOUNTER
Medication:   Requested Prescriptions     Pending Prescriptions Disp Refills    amphetamine-dextroamphetamine (ADDERALL, 30MG,) 30 MG tablet 30 tablet 0     Sig: Take 1 tablet by mouth daily for 30 days. Max Daily Amount: 30 mg        Last Filled:  06/16/25    Patient Phone Number: [phone] (home) [phone] (work)    Last appt: 6/24/2025   Next appt: 10/20/2025    Last OARRS:        No data to display

## 2025-07-22 DIAGNOSIS — G25.81 RLS (RESTLESS LEGS SYNDROME): ICD-10-CM

## 2025-07-22 DIAGNOSIS — G47.52 REM SLEEP BEHAVIOR DISORDER: ICD-10-CM

## 2025-07-22 DIAGNOSIS — F51.8 HYPNIC JERKS: ICD-10-CM

## 2025-07-22 RX ORDER — CLONAZEPAM 2 MG/1
TABLET ORAL
Qty: 60 TABLET | Refills: 5 | Status: SHIPPED | OUTPATIENT
Start: 2025-07-22 | End: 2025-07-22 | Stop reason: SDUPTHER

## 2025-07-22 RX ORDER — CLONAZEPAM 2 MG/1
TABLET ORAL
Qty: 60 TABLET | Refills: 5 | Status: SHIPPED | OUTPATIENT
Start: 2025-07-22 | End: 2028-07-27

## 2025-07-30 ENCOUNTER — TELEPHONE (OUTPATIENT)
Dept: PRIMARY CARE CLINIC | Age: 25
End: 2025-07-30

## 2025-07-30 NOTE — TELEPHONE ENCOUNTER
Pt called wanted to know if you could place labs , pt is asking for the same labs he took 2 months ago.

## 2025-07-31 DIAGNOSIS — R79.89 LOW TESTOSTERONE IN MALE: Primary | ICD-10-CM

## 2025-07-31 DIAGNOSIS — R79.89 LOW TESTOSTERONE IN MALE: ICD-10-CM

## 2025-08-01 LAB
25(OH)D3 SERPL-MCNC: 45.8 NG/ML
ALBUMIN SERPL-MCNC: 4.5 G/DL (ref 3.4–5)
ALBUMIN/GLOB SERPL: 1.7 {RATIO} (ref 1.1–2.2)
ALP SERPL-CCNC: 51 U/L (ref 40–129)
ALT SERPL-CCNC: 62 U/L (ref 10–40)
ANION GAP SERPL CALCULATED.3IONS-SCNC: 10 MMOL/L (ref 3–16)
AST SERPL-CCNC: 29 U/L (ref 15–37)
BASOPHILS # BLD: 0.1 K/UL (ref 0–0.2)
BASOPHILS NFR BLD: 0.8 %
BILIRUB SERPL-MCNC: 0.4 MG/DL (ref 0–1)
BUN SERPL-MCNC: 20 MG/DL (ref 7–20)
CALCIUM SERPL-MCNC: 9.6 MG/DL (ref 8.3–10.6)
CHLORIDE SERPL-SCNC: 102 MMOL/L (ref 99–110)
CO2 SERPL-SCNC: 28 MMOL/L (ref 21–32)
CREAT SERPL-MCNC: 1.2 MG/DL (ref 0.9–1.3)
DEPRECATED RDW RBC AUTO: 14.7 % (ref 12.4–15.4)
EOSINOPHIL # BLD: 0.4 K/UL (ref 0–0.6)
EOSINOPHIL NFR BLD: 6.2 %
GFR SERPLBLD CREATININE-BSD FMLA CKD-EPI: 85 ML/MIN/{1.73_M2}
GLUCOSE SERPL-MCNC: 107 MG/DL (ref 70–99)
HCT VFR BLD AUTO: 47.4 % (ref 40.5–52.5)
HGB BLD-MCNC: 15.8 G/DL (ref 13.5–17.5)
LH SERPL-ACNC: 5.8 MIU/ML
LYMPHOCYTES # BLD: 1.8 K/UL (ref 1–5.1)
LYMPHOCYTES NFR BLD: 26.3 %
MCH RBC QN AUTO: 29.5 PG (ref 26–34)
MCHC RBC AUTO-ENTMCNC: 33.3 G/DL (ref 31–36)
MCV RBC AUTO: 88.7 FL (ref 80–100)
MONOCYTES # BLD: 0.8 K/UL (ref 0–1.3)
MONOCYTES NFR BLD: 10.8 %
NEUTROPHILS # BLD: 3.9 K/UL (ref 1.7–7.7)
NEUTROPHILS NFR BLD: 55.9 %
PLATELET # BLD AUTO: 243 K/UL (ref 135–450)
PMV BLD AUTO: 10.1 FL (ref 5–10.5)
POTASSIUM SERPL-SCNC: 4.8 MMOL/L (ref 3.5–5.1)
PROLACTIN SERPL IA-MCNC: 16 NG/ML
PROT SERPL-MCNC: 7.1 G/DL (ref 6.4–8.2)
RBC # BLD AUTO: 5.34 M/UL (ref 4.2–5.9)
SODIUM SERPL-SCNC: 140 MMOL/L (ref 136–145)
T3FREE SERPL-MCNC: 3.3 PG/ML (ref 2.3–4.2)
T4 FREE SERPL-MCNC: 1.1 NG/DL (ref 0.9–1.8)
TSH SERPL DL<=0.005 MIU/L-ACNC: 4.45 UIU/ML (ref 0.27–4.2)
WBC # BLD AUTO: 7 K/UL (ref 4–11)

## 2025-08-05 ENCOUNTER — OFFICE VISIT (OUTPATIENT)
Age: 25
End: 2025-08-05
Payer: COMMERCIAL

## 2025-08-05 VITALS
HEIGHT: 72 IN | DIASTOLIC BLOOD PRESSURE: 80 MMHG | WEIGHT: 222.4 LBS | HEART RATE: 78 BPM | SYSTOLIC BLOOD PRESSURE: 120 MMHG | BODY MASS INDEX: 30.12 KG/M2 | OXYGEN SATURATION: 99 %

## 2025-08-05 DIAGNOSIS — R79.89 LOW TESTOSTERONE IN MALE: Primary | ICD-10-CM

## 2025-08-05 LAB
SHBG SERPL-SCNC: 15 NMOL/L (ref 10–60)
TESTOST FREE SERPL-MCNC: 162.3 PG/ML (ref 47–244)
TESTOST SERPL-MCNC: 536 NG/DL (ref 249–836)

## 2025-08-05 PROCEDURE — 99205 OFFICE O/P NEW HI 60 MIN: CPT | Performed by: HOSPITALIST

## 2025-08-05 RX ORDER — KETOCONAZOLE 20 MG/ML
SHAMPOO, SUSPENSION TOPICAL
COMMUNITY
Start: 2025-07-29

## 2025-08-18 ENCOUNTER — TELEMEDICINE (OUTPATIENT)
Age: 25
End: 2025-08-18
Payer: COMMERCIAL

## 2025-08-18 DIAGNOSIS — R79.89 LOW TESTOSTERONE IN MALE: Primary | ICD-10-CM

## 2025-08-18 PROCEDURE — 99214 OFFICE O/P EST MOD 30 MIN: CPT | Performed by: HOSPITALIST

## 2025-08-21 DIAGNOSIS — F90.2 ATTENTION DEFICIT HYPERACTIVITY DISORDER, COMBINED TYPE: ICD-10-CM

## 2025-08-22 RX ORDER — DEXTROAMPHETAMINE SACCHARATE, AMPHETAMINE ASPARTATE MONOHYDRATE, DEXTROAMPHETAMINE SULFATE AND AMPHETAMINE SULFATE 7.5; 7.5; 7.5; 7.5 MG/1; MG/1; MG/1; MG/1
30 CAPSULE, EXTENDED RELEASE ORAL DAILY
Qty: 30 CAPSULE | Refills: 0 | Status: SHIPPED | OUTPATIENT
Start: 2025-08-22 | End: 2025-09-21

## 2025-08-22 RX ORDER — DEXTROAMPHETAMINE SACCHARATE, AMPHETAMINE ASPARTATE, DEXTROAMPHETAMINE SULFATE AND AMPHETAMINE SULFATE 7.5; 7.5; 7.5; 7.5 MG/1; MG/1; MG/1; MG/1
30 TABLET ORAL DAILY
Qty: 30 TABLET | Refills: 0 | Status: SHIPPED | OUTPATIENT
Start: 2025-08-22 | End: 2025-09-21

## 2025-08-28 ENCOUNTER — OFFICE VISIT (OUTPATIENT)
Dept: ENT CLINIC | Age: 25
End: 2025-08-28
Payer: COMMERCIAL

## 2025-08-28 VITALS
SYSTOLIC BLOOD PRESSURE: 126 MMHG | HEART RATE: 82 BPM | BODY MASS INDEX: 30.15 KG/M2 | HEIGHT: 72 IN | DIASTOLIC BLOOD PRESSURE: 80 MMHG

## 2025-08-28 DIAGNOSIS — R09.81 NASAL CONGESTION: Primary | ICD-10-CM

## 2025-08-28 DIAGNOSIS — J34.2 DEVIATED SEPTUM: ICD-10-CM

## 2025-08-28 DIAGNOSIS — J34.3 HYPERTROPHY OF INFERIOR NASAL TURBINATE: ICD-10-CM

## 2025-08-28 PROCEDURE — 99214 OFFICE O/P EST MOD 30 MIN: CPT | Performed by: OTOLARYNGOLOGY
